# Patient Record
Sex: FEMALE | Race: WHITE | NOT HISPANIC OR LATINO | Employment: OTHER | URBAN - METROPOLITAN AREA
[De-identification: names, ages, dates, MRNs, and addresses within clinical notes are randomized per-mention and may not be internally consistent; named-entity substitution may affect disease eponyms.]

---

## 2017-01-06 ENCOUNTER — TRANSFERRED RECORDS (OUTPATIENT)
Dept: HEALTH INFORMATION MANAGEMENT | Facility: CLINIC | Age: 57
End: 2017-01-06

## 2017-03-22 ENCOUNTER — HOSPITAL ENCOUNTER (OUTPATIENT)
Dept: MAMMOGRAPHY | Facility: CLINIC | Age: 57
Discharge: HOME | End: 2017-03-22
Attending: FAMILY MEDICINE | Admitting: FAMILY MEDICINE
Payer: COMMERCIAL

## 2017-03-22 DIAGNOSIS — Z12.31 VISIT FOR SCREENING MAMMOGRAM: ICD-10-CM

## 2017-03-22 PROCEDURE — G0202 SCR MAMMO BI INCL CAD: HCPCS

## 2017-04-05 ENCOUNTER — RADIANT APPOINTMENT (OUTPATIENT)
Dept: GENERAL RADIOLOGY | Facility: CLINIC | Age: 57
End: 2017-04-05
Attending: PHYSICIAN ASSISTANT
Payer: COMMERCIAL

## 2017-04-05 ENCOUNTER — OFFICE VISIT (OUTPATIENT)
Dept: FAMILY MEDICINE | Facility: CLINIC | Age: 57
End: 2017-04-05
Payer: COMMERCIAL

## 2017-04-05 VITALS
BODY MASS INDEX: 28.7 KG/M2 | DIASTOLIC BLOOD PRESSURE: 78 MMHG | SYSTOLIC BLOOD PRESSURE: 112 MMHG | HEART RATE: 74 BPM | WEIGHT: 205 LBS | HEIGHT: 71 IN | TEMPERATURE: 97.2 F | OXYGEN SATURATION: 99 %

## 2017-04-05 DIAGNOSIS — M25.562 ACUTE PAIN OF LEFT KNEE: ICD-10-CM

## 2017-04-05 DIAGNOSIS — M17.12 PATELLOFEMORAL ARTHRITIS OF LEFT KNEE: Primary | ICD-10-CM

## 2017-04-05 PROCEDURE — 99213 OFFICE O/P EST LOW 20 MIN: CPT | Mod: 25 | Performed by: PHYSICIAN ASSISTANT

## 2017-04-05 PROCEDURE — 73562 X-RAY EXAM OF KNEE 3: CPT | Mod: LT

## 2017-04-05 PROCEDURE — 20610 DRAIN/INJ JOINT/BURSA W/O US: CPT | Mod: LT | Performed by: PHYSICIAN ASSISTANT

## 2017-04-05 RX ORDER — TRIAMCINOLONE ACETONIDE 40 MG/ML
80 INJECTION, SUSPENSION INTRA-ARTICULAR; INTRAMUSCULAR ONCE
Qty: 2 ML | Refills: 0 | OUTPATIENT
Start: 2017-04-05 | End: 2017-04-05

## 2017-04-05 NOTE — MR AVS SNAPSHOT
After Visit Summary   4/5/2017    Alfonzo Cho    MRN: [id number]           Patient Information     Date Of Birth          1960        Visit Information        Provider Department      4/5/2017 8:20 AM Lizzy Marr PA-C [facility] [place]        Today's Diagnoses     Patellofemoral arthritis of left knee    -  1    Acute pain of left knee          Care Instructions                    Patellofemoral Pain Syndrome (Runner's Knee)             What is patellofemoral pain syndrome?   Patellofemoral pain syndrome is pain behind the kneecap. It may also be called patellofemoral disorder, patellar malalignment, runner's knee, and chondromalacia.   How does it occur?   Patellofemoral pain syndrome can occur from overuse of the knee in sports and activities such as running, walking, jumping, or bicycling.   The kneecap (patella) is attached to the large group of muscles in the thigh called the quadriceps. It is also attached to the shin bone by the patellar tendon. The kneecap fits into grooves in the end of the thigh bone (femur) called the femoral condyle. With repeated bending and straightening of the knee, you can irritate the inside surface of the kneecap and cause pain.   Patellofemoral pain syndrome also may result from the way your hips, legs, knees, or feet are aligned. For example, if you have wide hips or underdeveloped thigh muscles, or if you are knock-kneed You may also have this problem if your foot flattens too much when you walk or run (a condition called over-pronation).   What are the symptoms?   The main symptom is pain behind the kneecap. You may have pain when you walk, run, or sit for a long time. The pain is usually worse when you walk downhill or down stairs. Your knee may swell at times. You may feel or hear snapping, popping, or grinding in the knee.   How is it diagnosed?   Your healthcare provider will review your symptoms and examine your knee. You will have  knee X-rays. You may have an MRI to check for damage to the surface of the patella or femur or another injury.   How is it treated?   Treatment includes the following:   Put an ice pack, gel pack, or package of frozen vegetables, wrapped in a cloth on the area every 3 to 4 hours, for up to 20 minutes at a time.   Raise the knee on a pillow when you sit or lie down.   Take an anti-inflammatory medicine such as ibuprofen, or other medicine as directed by your provider. Nonsteroidal anti-inflammatory medicines (NSAIDs) may cause stomach bleeding and other problems. These risks increase with age. Read the label and take as directed. Unless recommended by your healthcare provider, do not take for more than 10 days.   Follow your provider's instructions for doing exercises to help you recover. Your healthcare provider will show you exercises to help decrease the pain behind your kneecap.   If you over-pronate, your healthcare provider may recommend shoe inserts, called orthotics. You can buy orthotics at a pharmacy or athletic shoe store or they can be custom-made.   Use an infrapatellar strap, a strap placed below the kneecap over the patellar tendon.   Wear a neoprene knee sleeve, which will give support to your knee and patella.   While you recover from your injury, you will need to change your sport or activity to one that does not make your condition worse. For example, you may need to bicycle or swim instead of run.   In cases of severe patellofemoral pain syndrome, surgery may be recommended.   How long will the effects last?   Patellofemoral pain often lasts a long time and can come back after symptoms were better for a while. Treatment requires proper rehabilitation exercises that are done regularly.   When can I return to my normal activities?   Everyone recovers from an injury at a different rate. Return to your activities depends on how soon your knee recovers, not by how many days or weeks it has been since  your injury has occurred. In general, the longer you have symptoms before you start treatment, the longer it will take to get better. The goal is to return you to your normal activities as soon as is safely possible. If you return too soon you may worsen your injury.   You may safely return to your normal activities when, starting from the top of the list and progressing to the end, each of the following is true:   Your injured knee can be fully straightened and bent without pain.   Your knee and leg have regained normal strength compared to the uninjured knee and leg.   You are able to walk, bend, and squat without pain.   How can I prevent runner's knee?   Runner's knee can best be prevented by strengthening your thigh muscles, particularly the inside part of this muscle group. It is also important to wear shoes that fit well and that have good arch supports.     Published by [employer].  This content is reviewed periodically and is subject to change as new health information becomes available. The information is intended to inform and educate and is not a replacement for medical evaluation, advice, diagnosis or treatment by a healthcare professional.   Written by Jose Godoy MD, for [employer]   ? 2010 [employer] and/or its affiliates. All Rights Reserved.   Copyright   [employer] 2011  Adult Health Advisor    Patellofemoral Pain Syndrome (Runner's Knee) Rehabilitation Exercises              You can do the hamstring stretch right away. When the pain in your knee has decreased, you can do the quadriceps stretch and start strengthening the thigh muscles using the rest of the exercises.   Standing hamstring stretch: Put the heel of the leg on your injured side on a stool about 15 inches high. Keep your leg straight. Lean forward, bending at the hips, until you feel a mild stretch in the back of your thigh. Make sure you don't roll your shoulders or bend at the waist when doing this or you will  stretch your lower back instead of your leg. Hold the stretch for 15 to 30 seconds. Repeat 3 times.   Quadriceps stretch: Stand an arm's length away from the wall with your injured leg farthest from the wall. Facing straight ahead, brace yourself by keeping one hand against the wall. With your other hand, grasp the ankle of your injured leg and pull your heel toward your buttocks. Don't arch or twist your back. Keep your knees together. Hold this stretch for 15 to 30 seconds.   Side-lying leg lift: Lie on your uninjured side. Tighten the front thigh muscles on your injured leg and lift that leg 8 to 10 inches away from the other leg. Keep the leg straight and lower it slowly. Do 3 sets of 10.   Quad sets: Sit on the floor with your injured leg straight and your other leg bent. Press the back of the knee of your injured leg against the floor by tightening the muscles on the top of your thigh. Hold this position 10 seconds. Relax. Do 3 sets of 10.   Straight leg raise: Lie on your back with your legs straight out in front of you. Bend the knee on your uninjured side and place the foot flat on the floor. Tighten the thigh muscle on your injured side and lift your leg about 8 inches off the floor. Keep your leg straight and your thigh muscle tight. Slowly lower your leg back down to the floor. Do 3 sets of 10.   Step-up: Stand with the foot of your injured leg on a support 3 to 5 inches high (like a small step or block of wood). Keep your other foot flat on the floor. Shift your weight onto the injured leg on the support. Straighten your injured leg as the other leg comes off the floor. Return to the starting position by bending your injured leg and slowly lowering your uninjured leg back to the floor. Do 3 sets of 10.   Wall squat with a ball: Stand with your back, shoulders, and head against a wall. Look straight ahead. Keep your shoulders relaxed and your feet 3 feet from the wall and shoulder's width apart. Place a  soccer or basketball-sized ball behind your back. Keeping your back against the wall, slowly squat down to a 45-degree angle. Your thighs will not yet be parallel to the floor. Hold this position for 10 seconds and then slowly slide back up the wall. Repeat 10 times. Build up to 3 sets of 10.   Knee stabilization: Wrap a piece of elastic tubing around the ankle of the uninjured leg. Tie a knot in the other end of the tubing and close it in a door.   0. Stand facing the door on the leg without tubing and bend your knee slightly, keeping your thigh muscles tight. While maintaining this position, move the leg with the tubing straight back behind you. Do 3 sets of 10.   0. Turn 90 degrees so the leg without tubing is closest to the door. Move the leg with tubing away from your body. Do 3 sets of 10.   0. Turn 90 degrees again so your back is to the door. Move the leg with tubing straight out in front of you. Do 3 sets of 10.   0. Turn your body 90 degrees again so the leg with tubing is closest to the door. Move the leg with tubing across your body. Do 3 sets of 10.   Hold onto a chair if you need help balancing. This exercise can be made even more challenging by standing on a pillow while you move the leg with tubing.   Resisted terminal knee extension: Make a loop from a piece of elastic tubing by tying a knot in both ends. Close both knots in a door. Step into the loop so the tubing is around the back of your injured leg. Lift the other foot off the ground. Hold onto a chair for balance, if needed. Bend the knee on the leg with tubing about 45 degrees. Slowly straighten your leg, keeping your thigh muscle tight as you do this. Do this 10 times. Do 3 sets. An easier way to do this is to stand on both legs for better support while you do the exercise.   Standing calf stretch: Stand facing a wall with your hands on the wall at about eye level. Keep your injured leg back with your heel on the floor. Keep the other leg  forward with the knee bent. Turn your back foot slightly inward (as if you were pigeon-toed). Slowly lean into the wall until you feel a stretch in the back of your calf. Hold the stretch for 15 to 30 seconds. Return to the starting position. Repeat 3 times. Do this exercise several times each day.   Clam exercise: Lie on your uninjured side with your hips and knees bent and feet together. Slowly raise your top leg toward the ceiling while keeping your heels touching each other. Hold for 2 seconds and lower slowly. Do 3 sets of 10 repetitions.   Iliotibial band stretch: Side-bending: Cross one leg in front of the other leg and lean in the opposite direction from the front leg. Reach the arm on the side of the back leg over your head while you do this. Hold this position for 15 to 30 seconds. Return to the starting position. Repeat 3 times and then switch legs and repeat the exercise.   Published by [employer].  This content is reviewed periodically and is subject to change as new health information becomes available. The information is intended to inform and educate and is not a replacement for medical evaluation, advice, diagnosis or treatment by a healthcare professional.   Written by Dacia Vides, MS, PT, and Carol Funes, PT, [facility], [facility], for [employer].   ? 2010 [facility] and/or its affiliates. All Rights Reserved.   Copyright   [employer] Reference Systems 2011  Adult Health Advisor                                   Follow-ups after your visit        Your next 10 appointments already scheduled     Apr 21, 2017  8:15 AM CDT   PHYSICAL with Kristal Peña MD   [facility]    [address] [place] [address] [id number] [phone]              Who to contact     If you have questions or need follow up information about today's clinic visit or your schedule please contact [facility] directly at [phone].  Normal or  "non-critical lab and imaging results will be communicated to you by MyChart, letter or phone within 4 business days after the clinic has received the results. If you do not hear from us within 7 days, please contact the clinic through [employer] or phone. If you have a critical or abnormal lab result, we will notify you by phone as soon as possible.  Submit refill requests through [employer] or call your pharmacy and they will forward the refill request to us. Please allow 3 business days for your refill to be completed.          Additional Information About Your Visit        Bringrr Information     [employer] gives you secure access to your electronic health record. If you see a primary care provider, you can also send messages to your care team and make appointments. If you have questions, please call your primary care clinic.  If you do not have a primary care provider, please call [phone] and they will assist you.        Care EveryWhere ID     This is your Care EveryWhere ID. This could be used by other organizations to access your [facility] medical records  [id number]        Your Vitals Were     Pulse Temperature Height Last Period Pulse Oximetry BMI (Body Mass Index)    74 97.2  F (36.2  C) (Tympanic) 5' 11\" (1.803 m) 05/01/2009 99% 28.59 kg/m2       Blood Pressure from Last 3 Encounters:   04/05/17 112/78   12/30/16 120/74   11/23/16 102/66    Weight from Last 3 Encounters:   04/05/17 205 lb (93 kg)   12/30/16 203 lb (92.1 kg)   11/23/16 203 lb (92.1 kg)              We Performed the Following     Kenalog  80 MG         []     Large Joint/Bursa injection and/or drainage (Shoulder, Knee)          Today's Medication Changes          These changes are accurate as of: 4/5/17  9:13 AM.  If you have any questions, ask your nurse or doctor.               Start taking these medicines.        Dose/Directions    triamcinolone acetonide 40 MG/ML injection   Commonly known as:  KENALOG   Used for:  Patellofemoral " arthritis of left knee   Started by:  Lizzy Marr PA-C        Dose:  80 mg   2 mLs (80 mg) by INTRA-ARTICULAR route once for 1 dose   Quantity:  2 mL   Refills:  0            Where to get your medicines      Some of these will need a paper prescription and others can be bought over the counter.  Ask your nurse if you have questions.     You don't need a prescription for these medications     triamcinolone acetonide 40 MG/ML injection                Primary Care Provider Office Phone # Fax #    Kristal Peña [phone]       [facility] [address] [facility] [address]        Thank you!     Thank you for choosing [facility]  for your care. Our goal is always to provide you with excellent care. Hearing back from our patients is one way we can continue to improve our services. Please take a few minutes to complete the written survey that you may receive in the mail after your visit with us. Thank you!             Your Updated Medication List - Protect others around you: Learn how to safely use, store and throw away your medicines at www.disposemymeds.org.          This list is accurate as of: 4/5/17  9:13 AM.  Always use your most recent med list.                   Brand Name Dispense Instructions for use    aspirin 81 MG tablet     30 tablet    Take 1 tablet (81 mg) by mouth daily For hx of blood clots - take with food and warm water in the am       cetirizine 10 MG tablet    zyrTEC    30 tablet    Take 1 tablet (10 mg) by mouth as needed for allergies       cyclobenzaprine 10 MG tablet    FLEXERIL    30 tablet    Take 0.5-1 tablets (5-10 mg) by mouth 3 times daily as needed for muscle spasms       triamcinolone acetonide 40 MG/ML injection    KENALOG    2 mL    2 mLs (80 mg) by INTRA-ARTICULAR route once for 1 dose

## 2017-04-05 NOTE — PROGRESS NOTES
SUBJECTIVE:                                                    Alfonzo Cho is a 56 year old female who presents to clinic today for the following health issues:    Knee Pain  Alfonzo who is patient with PMH significant for chronic joint pain presents to clinic today with chief complaint of left knee pain. Onset of symptoms was ~1 month ago. She has had left knee imaging done in 2013 after complaining of knee pain with no precipitating injuries/ activities. Imaging results were normal/ negative. She was then sent to sports orthopedics which per patient diagnosed her with bursitis. Patient has never agreed with this diagnosis. She then followed up with podiatry and she was told that her pain was consistent with IT band strain. Today she describes pain in left lateral knee radiating down left shin. She admits to falling this past winter on ice landing on left knee that may have aggravated left knee pain. Pain is aggravated by walking for long periods of time and climbing stairs. Knee is also stiff when standing after a long period of sitting.     Problem list and histories reviewed & adjusted, as indicated.  Additional history: as documented    Patient Active Problem List   Diagnosis     Pain in joint, pelvic region and thigh     Seasonal allergic rhinitis     Symptomatic menopausal or female climacteric states     Sleep apnea      Thrombophlebitis of Femoral Vein - hx of      history of Thrombophlebitis leg (H)     Plantar fasciitis     Seborrheic dermatitis     CARDIOVASCULAR SCREENING; LDL GOAL LESS THAN 160     Infrapatellar bursitis     Low blood pressure- usual - asymptomatic- 90/60 = baseline     Family history of hypothyroidism- father     Memory difficulties- some short-term, worse at work     Advanced directives, counseling/discussion     Aortic calcification (H)     Nonrheumatic aortic valve insufficiency - mild - no symptoms.      Papanicolaou smear of cervix with atypical squamous cells of undetermined  significance (ASC-US)     Past Surgical History:   Procedure Laterality Date     C NONSPECIFIC PROCEDURE      S/P CSECT X 3     C NONSPECIFIC PROCEDURE  92,02    S/P VEIN STRIPPING      HC COLONOSCOPY THRU STOMA, DIAGNOSTIC  2009     [place] gastro - normal - repeat in 2019        Social History   Substance Use Topics     Smoking status: Former Smoker     Years: 2.00     Types: Cigarettes     Start date: 1978     Quit date: 1980     Smokeless tobacco: Never Used      Comment: only smoked a couple of years     Alcohol use 0.0 oz/week     0 Standard drinks or equivalent per week      Comment: 0-2 drinks per week     Family History   Problem Relation Age of Onset     Eye Disorder Father      glaucoma     Neurologic Disorder Father      Passed away from ALS at 74     Thyroid Disease Father      CANCER Mother 78     endometrial cancer s/p hysterectomy at age 80 - still alive      OSTEOPOROSIS Mother      on Fosamax      C.A.D. Maternal Grandfather       age 62     DIABETES Paternal Uncle      CEREBROVASCULAR DISEASE Maternal Grandmother       age 70      Esophageal Cancer Maternal Uncle      Esophageal Cancer Maternal Uncle          Current Outpatient Prescriptions   Medication Sig Dispense Refill     triamcinolone acetonide (KENALOG) 40 MG/ML injection 2 mLs (80 mg) by INTRA-ARTICULAR route once for 1 dose 2 mL 0     aspirin 81 MG tablet Take 1 tablet (81 mg) by mouth daily For hx of blood clots - take with food and warm water in the am 30 tablet      cyclobenzaprine (FLEXERIL) 10 MG tablet Take 0.5-1 tablets (5-10 mg) by mouth 3 times daily as needed for muscle spasms 30 tablet 1     cetirizine (ZYRTEC) 10 MG tablet Take 1 tablet (10 mg) by mouth as needed for allergies 30 tablet 1     Allergies   Allergen Reactions     Penicillins      IV penicillin- Hives, edema hands and feet       Reviewed and updated as needed this visit by clinical staff  Tobacco  Allergies  Meds  Problems  Med Hx       Reviewed  "and updated as needed this visit by Provider  Allergies  Meds  Problems  Med Hx         ROS:  Constitutional, HEENT, cardiovascular, pulmonary, GI, , musculoskeletal, neuro, skin, endocrine and psych systems are negative, except as otherwise noted.    This document serves as a record of the services and decisions personally performed and made by Lizzy Marr PA-C. It was created on her behalf by Lali Couch, a trained medical scribe. The creation of this document is based the provider's statements to the medical scribe.  Lali Couch, April 5, 2017 8:43 AM    OBJECTIVE:                                                    /78  Pulse 74  Temp 97.2  F (36.2  C) (Tympanic)  Ht 5' 11\" (1.803 m)  Wt 205 lb (93 kg)  LMP 05/01/2009  SpO2 99%  BMI 28.59 kg/m2  Body mass index is 28.59 kg/(m^2).     GENERAL: healthy, alert and no distress  RESP: lungs clear to auscultation - no rales, rhonchi or wheezes  CV: regular rate and rhythm, normal S1 S2, no S3 or S4, no murmur, click or rub, no peripheral edema and peripheral pulses strong  MS: 3+ swelling of left knee, left lateral joint line tenderness, left IT band and IT insertion tender, left patellar crepitation  NEURO: Normal strength and tone, mentation intact and speech normal  PSYCH: mentation appears normal, affect normal/bright    Procedure:  After discussing the risks, benefits and alternatives to a left knee intra-articularcortisone injection the patient elected to proceed with the injection.  The anterior aspect of the left knee was prepped with a betadine solution.  Using a sterile technique and a 22 gauge needle, 4 cc's of 1% lidocaine, 4 cc's of 0.25% marcaine, and 80 mg in Kenalog were injected into the left knee intra-articular.  The patient tolerated the procedure well and there were no immediate adverse effects.        Diagnostic Test Results:  Xray -   Recent Results (from the past 24 hour(s))   XR Knee Left 3 Views    Narrative    LEFT KNEE " 3 VIEWS  4/5/2017 8:59 AM    HISTORY:  Pain in left knee    COMPARISON:  9/18/2013    FINDINGS:  No fracture or osseous lesion is seen. The joint spaces are  well preserved. No adjacent soft tissue pathology is seen.      Impression    IMPRESSION:  Unremarkable examination. I see no definite change since  the previous examination.     DANN BALDERRAMA MD        ASSESSMENT/PLAN:                                                    Alfonzo was seen today for knee pain.    Diagnoses and all orders for this visit:    Patellofemoral arthritis of left knee, Acute pain of left knee - cannot exclude lateral meniscal tear and/or IT band involvement.  Patient is stable and doing well after left knee injection.  Given exercises for PF syndrome.  If no improvement or only short term improvement patient will call clinic and we should pursue MRI.  -     Large Joint/Bursa injection and/or drainage (Shoulder, Knee)  -     Kenalog  80 MG         []  -     triamcinolone acetonide (KENALOG) 40 MG/ML injection; 2 mLs (80 mg) by INTRA-ARTICULAR route once for 1 dose  -     XR Knee Left 3 Views; Future    The information in this document, created by the medical scribe for me, accurately reflects the services I personally performed and the decisions made by me. I have reviewed and approved this document for accuracy prior to leaving the patient care area.   Lizzy Marr PA-C April 5, 2017 8:43 AM    Lizzy Marr PA-C  [facility]

## 2017-04-05 NOTE — PATIENT INSTRUCTIONS
Patellofemoral Pain Syndrome (Runner's Knee)             What is patellofemoral pain syndrome?   Patellofemoral pain syndrome is pain behind the kneecap. It may also be called patellofemoral disorder, patellar malalignment, runner's knee, and chondromalacia.   How does it occur?   Patellofemoral pain syndrome can occur from overuse of the knee in sports and activities such as running, walking, jumping, or bicycling.   The kneecap (patella) is attached to the large group of muscles in the thigh called the quadriceps. It is also attached to the shin bone by the patellar tendon. The kneecap fits into grooves in the end of the thigh bone (femur) called the femoral condyle. With repeated bending and straightening of the knee, you can irritate the inside surface of the kneecap and cause pain.   Patellofemoral pain syndrome also may result from the way your hips, legs, knees, or feet are aligned. For example, if you have wide hips or underdeveloped thigh muscles, or if you are knock-kneed You may also have this problem if your foot flattens too much when you walk or run (a condition called over-pronation).   What are the symptoms?   The main symptom is pain behind the kneecap. You may have pain when you walk, run, or sit for a long time. The pain is usually worse when you walk downhill or down stairs. Your knee may swell at times. You may feel or hear snapping, popping, or grinding in the knee.   How is it diagnosed?   Your healthcare provider will review your symptoms and examine your knee. You will have knee X-rays. You may have an MRI to check for damage to the surface of the patella or femur or another injury.   How is it treated?   Treatment includes the following:   Put an ice pack, gel pack, or package of frozen vegetables, wrapped in a cloth on the area every 3 to 4 hours, for up to 20 minutes at a time.   Raise the knee on a pillow when you sit or lie down.   Take an anti-inflammatory medicine such  as ibuprofen, or other medicine as directed by your provider. Nonsteroidal anti-inflammatory medicines (NSAIDs) may cause stomach bleeding and other problems. These risks increase with age. Read the label and take as directed. Unless recommended by your healthcare provider, do not take for more than 10 days.   Follow your provider's instructions for doing exercises to help you recover. Your healthcare provider will show you exercises to help decrease the pain behind your kneecap.   If you over-pronate, your healthcare provider may recommend shoe inserts, called orthotics. You can buy orthotics at a pharmacy or athletic shoe store or they can be custom-made.   Use an infrapatellar strap, a strap placed below the kneecap over the patellar tendon.   Wear a neoprene knee sleeve, which will give support to your knee and patella.   While you recover from your injury, you will need to change your sport or activity to one that does not make your condition worse. For example, you may need to bicycle or swim instead of run.   In cases of severe patellofemoral pain syndrome, surgery may be recommended.   How long will the effects last?   Patellofemoral pain often lasts a long time and can come back after symptoms were better for a while. Treatment requires proper rehabilitation exercises that are done regularly.   When can I return to my normal activities?   Everyone recovers from an injury at a different rate. Return to your activities depends on how soon your knee recovers, not by how many days or weeks it has been since your injury has occurred. In general, the longer you have symptoms before you start treatment, the longer it will take to get better. The goal is to return you to your normal activities as soon as is safely possible. If you return too soon you may worsen your injury.   You may safely return to your normal activities when, starting from the top of the list and progressing to the end, each of the following is  true:   Your injured knee can be fully straightened and bent without pain.   Your knee and leg have regained normal strength compared to the uninjured knee and leg.   You are able to walk, bend, and squat without pain.   How can I prevent runner's knee?   Runner's knee can best be prevented by strengthening your thigh muscles, particularly the inside part of this muscle group. It is also important to wear shoes that fit well and that have good arch supports.     Published by [employer].  This content is reviewed periodically and is subject to change as new health information becomes available. The information is intended to inform and educate and is not a replacement for medical evaluation, advice, diagnosis or treatment by a healthcare professional.   Written by Jose Godoy MD, for [employer]   ? 2010 [facility] and/or its affiliates. All Rights Reserved.   Copyright   [employer] 2011  Adult Health Advisor    Patellofemoral Pain Syndrome (Runner's Knee) Rehabilitation Exercises              You can do the hamstring stretch right away. When the pain in your knee has decreased, you can do the quadriceps stretch and start strengthening the thigh muscles using the rest of the exercises.   Standing hamstring stretch: Put the heel of the leg on your injured side on a stool about 15 inches high. Keep your leg straight. Lean forward, bending at the hips, until you feel a mild stretch in the back of your thigh. Make sure you don't roll your shoulders or bend at the waist when doing this or you will stretch your lower back instead of your leg. Hold the stretch for 15 to 30 seconds. Repeat 3 times.   Quadriceps stretch: Stand an arm's length away from the wall with your injured leg farthest from the wall. Facing straight ahead, brace yourself by keeping one hand against the wall. With your other hand, grasp the ankle of your injured leg and pull your heel toward your buttocks. Don't arch or twist your  back. Keep your knees together. Hold this stretch for 15 to 30 seconds.   Side-lying leg lift: Lie on your uninjured side. Tighten the front thigh muscles on your injured leg and lift that leg 8 to 10 inches away from the other leg. Keep the leg straight and lower it slowly. Do 3 sets of 10.   Quad sets: Sit on the floor with your injured leg straight and your other leg bent. Press the back of the knee of your injured leg against the floor by tightening the muscles on the top of your thigh. Hold this position 10 seconds. Relax. Do 3 sets of 10.   Straight leg raise: Lie on your back with your legs straight out in front of you. Bend the knee on your uninjured side and place the foot flat on the floor. Tighten the thigh muscle on your injured side and lift your leg about 8 inches off the floor. Keep your leg straight and your thigh muscle tight. Slowly lower your leg back down to the floor. Do 3 sets of 10.   Step-up: Stand with the foot of your injured leg on a support 3 to 5 inches high (like a small step or block of wood). Keep your other foot flat on the floor. Shift your weight onto the injured leg on the support. Straighten your injured leg as the other leg comes off the floor. Return to the starting position by bending your injured leg and slowly lowering your uninjured leg back to the floor. Do 3 sets of 10.   Wall squat with a ball: Stand with your back, shoulders, and head against a wall. Look straight ahead. Keep your shoulders relaxed and your feet 3 feet from the wall and shoulder's width apart. Place a soccer or basketball-sized ball behind your back. Keeping your back against the wall, slowly squat down to a 45-degree angle. Your thighs will not yet be parallel to the floor. Hold this position for 10 seconds and then slowly slide back up the wall. Repeat 10 times. Build up to 3 sets of 10.   Knee stabilization: Wrap a piece of elastic tubing around the ankle of the uninjured leg. Tie a knot in the other  end of the tubing and close it in a door.   0. Stand facing the door on the leg without tubing and bend your knee slightly, keeping your thigh muscles tight. While maintaining this position, move the leg with the tubing straight back behind you. Do 3 sets of 10.   0. Turn 90 degrees so the leg without tubing is closest to the door. Move the leg with tubing away from your body. Do 3 sets of 10.   0. Turn 90 degrees again so your back is to the door. Move the leg with tubing straight out in front of you. Do 3 sets of 10.   0. Turn your body 90 degrees again so the leg with tubing is closest to the door. Move the leg with tubing across your body. Do 3 sets of 10.   Hold onto a chair if you need help balancing. This exercise can be made even more challenging by standing on a pillow while you move the leg with tubing.   Resisted terminal knee extension: Make a loop from a piece of elastic tubing by tying a knot in both ends. Close both knots in a door. Step into the loop so the tubing is around the back of your injured leg. Lift the other foot off the ground. Hold onto a chair for balance, if needed. Bend the knee on the leg with tubing about 45 degrees. Slowly straighten your leg, keeping your thigh muscle tight as you do this. Do this 10 times. Do 3 sets. An easier way to do this is to stand on both legs for better support while you do the exercise.   Standing calf stretch: Stand facing a wall with your hands on the wall at about eye level. Keep your injured leg back with your heel on the floor. Keep the other leg forward with the knee bent. Turn your back foot slightly inward (as if you were pigeon-toed). Slowly lean into the wall until you feel a stretch in the back of your calf. Hold the stretch for 15 to 30 seconds. Return to the starting position. Repeat 3 times. Do this exercise several times each day.   Clam exercise: Lie on your uninjured side with your hips and knees bent and feet together. Slowly raise your  top leg toward the ceiling while keeping your heels touching each other. Hold for 2 seconds and lower slowly. Do 3 sets of 10 repetitions.   Iliotibial band stretch: Side-bending: Cross one leg in front of the other leg and lean in the opposite direction from the front leg. Reach the arm on the side of the back leg over your head while you do this. Hold this position for 15 to 30 seconds. Return to the starting position. Repeat 3 times and then switch legs and repeat the exercise.   Published by [employer].  This content is reviewed periodically and is subject to change as new health information becomes available. The information is intended to inform and educate and is not a replacement for medical evaluation, advice, diagnosis or treatment by a healthcare professional.   Written by Dacia Vides MS, PT, and Carol Funes PT, [facility], [facility], for [employer].   ? 2010 [facility] and/or its affiliates. All Rights Reserved.   Copyright   [employer] 2011  Adult Health Advisor

## 2017-04-05 NOTE — NURSING NOTE
"Chief Complaint   Patient presents with     Knee Pain       Initial /78  Pulse 74  Temp 97.2  F (36.2  C) (Tympanic)  Ht 5' 11\" (1.803 m)  Wt 205 lb (93 kg)  LMP 05/01/2009  SpO2 99%  BMI 28.59 kg/m2 Estimated body mass index is 28.59 kg/(m^2) as calculated from the following:    Height as of this encounter: 5' 11\" (1.803 m).    Weight as of this encounter: 205 lb (93 kg).  Medication Reconciliation: complete   Anne-Marie Pruitt, KARLA      "

## 2017-04-06 ENCOUNTER — TELEPHONE (OUTPATIENT)
Dept: FAMILY MEDICINE | Facility: CLINIC | Age: 57
End: 2017-04-06

## 2017-04-06 NOTE — TELEPHONE ENCOUNTER
Pt had a cortisone injection yesterday and has had facial flushing    [phone].    Message handled by Nurse Triage with [employer] - provider name: STEWART CUMMINGS     Called pt back advised it is not abnormal but to monitor if breathing issues go to ER.  If pt gets rash call back.    Leeann Kurtz RN    [facility]

## 2017-04-06 NOTE — PROGRESS NOTES
Results discussed directly with patient while patient was present. Any further details documented in the note.   Lizzy Marr PA-C

## 2017-09-05 ENCOUNTER — OFFICE VISIT (OUTPATIENT)
Dept: FAMILY MEDICINE | Facility: CLINIC | Age: 57
End: 2017-09-05
Payer: COMMERCIAL

## 2017-09-05 VITALS
DIASTOLIC BLOOD PRESSURE: 61 MMHG | HEIGHT: 71 IN | WEIGHT: 203.8 LBS | TEMPERATURE: 96.9 F | HEART RATE: 84 BPM | BODY MASS INDEX: 28.53 KG/M2 | OXYGEN SATURATION: 98 % | SYSTOLIC BLOOD PRESSURE: 99 MMHG

## 2017-09-05 DIAGNOSIS — J30.2 SEASONAL ALLERGIC RHINITIS, UNSPECIFIED CHRONICITY, UNSPECIFIED TRIGGER: ICD-10-CM

## 2017-09-05 DIAGNOSIS — I35.1 NONRHEUMATIC AORTIC VALVE INSUFFICIENCY: ICD-10-CM

## 2017-09-05 DIAGNOSIS — I70.0 AORTIC CALCIFICATION (H): ICD-10-CM

## 2017-09-05 DIAGNOSIS — K21.9 GASTROESOPHAGEAL REFLUX DISEASE, ESOPHAGITIS PRESENCE NOT SPECIFIED: ICD-10-CM

## 2017-09-05 DIAGNOSIS — Z83.49 FAMILY HISTORY OF HYPOTHYROIDISM: ICD-10-CM

## 2017-09-05 DIAGNOSIS — N95.1 VAGINAL DRYNESS, MENOPAUSAL: ICD-10-CM

## 2017-09-05 DIAGNOSIS — I80.3 THROMBOPHLEBITIS LEG: ICD-10-CM

## 2017-09-05 DIAGNOSIS — Z00.00 ROUTINE GENERAL MEDICAL EXAMINATION AT A HEALTH CARE FACILITY: Primary | ICD-10-CM

## 2017-09-05 DIAGNOSIS — Z12.31 VISIT FOR SCREENING MAMMOGRAM: ICD-10-CM

## 2017-09-05 DIAGNOSIS — R41.3 MEMORY DIFFICULTIES: ICD-10-CM

## 2017-09-05 DIAGNOSIS — R53.83 OTHER FATIGUE: ICD-10-CM

## 2017-09-05 DIAGNOSIS — G47.30 SLEEP APNEA, UNSPECIFIED TYPE: ICD-10-CM

## 2017-09-05 DIAGNOSIS — Z11.59 NEED FOR HEPATITIS C SCREENING TEST: ICD-10-CM

## 2017-09-05 LAB
ALBUMIN SERPL-MCNC: 3.9 G/DL (ref 3.4–5)
ALP SERPL-CCNC: 64 U/L (ref 40–150)
ALT SERPL W P-5'-P-CCNC: 29 U/L (ref 0–50)
ANION GAP SERPL CALCULATED.3IONS-SCNC: 8 MMOL/L (ref 3–14)
AST SERPL W P-5'-P-CCNC: 21 U/L (ref 0–45)
BILIRUB SERPL-MCNC: 0.5 MG/DL (ref 0.2–1.3)
BUN SERPL-MCNC: 11 MG/DL (ref 7–30)
CALCIUM SERPL-MCNC: 9.1 MG/DL (ref 8.5–10.1)
CHLORIDE SERPL-SCNC: 106 MMOL/L (ref 94–109)
CHOLEST SERPL-MCNC: 179 MG/DL
CO2 SERPL-SCNC: 27 MMOL/L (ref 20–32)
CREAT SERPL-MCNC: 0.79 MG/DL (ref 0.52–1.04)
DEPRECATED CALCIDIOL+CALCIFEROL SERPL-MC: 22 UG/L (ref 20–75)
ERYTHROCYTE [DISTWIDTH] IN BLOOD BY AUTOMATED COUNT: 12.8 % (ref 10–15)
FERRITIN SERPL-MCNC: 114 NG/ML (ref 8–252)
GFR SERPL CREATININE-BSD FRML MDRD: 74 ML/MIN/1.7M2
GLUCOSE SERPL-MCNC: 91 MG/DL (ref 70–99)
HCT VFR BLD AUTO: 41.5 % (ref 35–47)
HCV AB SERPL QL IA: NONREACTIVE
HDLC SERPL-MCNC: 77 MG/DL
HGB BLD-MCNC: 13.7 G/DL (ref 11.7–15.7)
LDLC SERPL CALC-MCNC: 91 MG/DL
MCH RBC QN AUTO: 31 PG (ref 26.5–33)
MCHC RBC AUTO-ENTMCNC: 33 G/DL (ref 31.5–36.5)
MCV RBC AUTO: 94 FL (ref 78–100)
NONHDLC SERPL-MCNC: 102 MG/DL
PLATELET # BLD AUTO: 240 10E9/L (ref 150–450)
POTASSIUM SERPL-SCNC: 4 MMOL/L (ref 3.4–5.3)
PROT SERPL-MCNC: 7.1 G/DL (ref 6.8–8.8)
RBC # BLD AUTO: 4.42 10E12/L (ref 3.8–5.2)
SODIUM SERPL-SCNC: 141 MMOL/L (ref 133–144)
T3FREE SERPL-MCNC: 2.4 PG/ML (ref 2.3–4.2)
T4 FREE SERPL-MCNC: 1.15 NG/DL (ref 0.76–1.46)
TRIGL SERPL-MCNC: 53 MG/DL
TSH SERPL DL<=0.005 MIU/L-ACNC: 1.76 MU/L (ref 0.4–4)
VIT B12 SERPL-MCNC: 300 PG/ML (ref 193–986)
WBC # BLD AUTO: 4.9 10E9/L (ref 4–11)

## 2017-09-05 PROCEDURE — 80053 COMPREHEN METABOLIC PANEL: CPT | Performed by: PHYSICIAN ASSISTANT

## 2017-09-05 PROCEDURE — 84481 FREE ASSAY (FT-3): CPT | Performed by: PHYSICIAN ASSISTANT

## 2017-09-05 PROCEDURE — 99213 OFFICE O/P EST LOW 20 MIN: CPT | Mod: 25 | Performed by: PHYSICIAN ASSISTANT

## 2017-09-05 PROCEDURE — 82728 ASSAY OF FERRITIN: CPT | Performed by: PHYSICIAN ASSISTANT

## 2017-09-05 PROCEDURE — 85027 COMPLETE CBC AUTOMATED: CPT | Performed by: PHYSICIAN ASSISTANT

## 2017-09-05 PROCEDURE — 82607 VITAMIN B-12: CPT | Performed by: PHYSICIAN ASSISTANT

## 2017-09-05 PROCEDURE — 99396 PREV VISIT EST AGE 40-64: CPT | Performed by: PHYSICIAN ASSISTANT

## 2017-09-05 PROCEDURE — 82306 VITAMIN D 25 HYDROXY: CPT | Performed by: PHYSICIAN ASSISTANT

## 2017-09-05 PROCEDURE — 36415 COLL VENOUS BLD VENIPUNCTURE: CPT | Performed by: PHYSICIAN ASSISTANT

## 2017-09-05 PROCEDURE — 84439 ASSAY OF FREE THYROXINE: CPT | Performed by: PHYSICIAN ASSISTANT

## 2017-09-05 PROCEDURE — 84443 ASSAY THYROID STIM HORMONE: CPT | Performed by: PHYSICIAN ASSISTANT

## 2017-09-05 PROCEDURE — 86803 HEPATITIS C AB TEST: CPT | Performed by: PHYSICIAN ASSISTANT

## 2017-09-05 PROCEDURE — 86376 MICROSOMAL ANTIBODY EACH: CPT | Performed by: PHYSICIAN ASSISTANT

## 2017-09-05 PROCEDURE — 80061 LIPID PANEL: CPT | Performed by: PHYSICIAN ASSISTANT

## 2017-09-05 NOTE — NURSING NOTE
"Chief Complaint   Patient presents with     Physical       Initial BP 99/61 (BP Location: Left arm, Patient Position: Chair, Cuff Size: Adult Large)  Pulse 84  Temp 96.9  F (36.1  C) (Tympanic)  Ht 5' 11\" (1.803 m)  Wt 203 lb 12.8 oz (92.4 kg)  LMP 05/01/2009  SpO2 98%  Breastfeeding? No  BMI 28.42 kg/m2 Estimated body mass index is 28.42 kg/(m^2) as calculated from the following:    Height as of this encounter: 5' 11\" (1.803 m).    Weight as of this encounter: 203 lb 12.8 oz (92.4 kg).  Medication Reconciliation: complete     Alia Martinez MA     "

## 2017-09-05 NOTE — PROGRESS NOTES
"   SUBJECTIVE:   CC: Alfonzo Cho is an 57 year old woman who presents for preventive health visit.     Annual Exam:  Getting at least 3 servings of Calcium per day:: Yes  Bi-annual eye exam:: Yes  Dental care twice a year:: Yes  Sleep apnea or symptoms of sleep apnea:: Sleep apnea  Diet:: Regular (no restrictions)  Frequency of exercise:: 2-3 days/week  Taking medications regularly:: Not Applicable  Medication side effects:: Not applicable  Additional concerns today:: YES  PHQ-2 Score: 1  Duration of exercise:: 15-30 minutes      Vaginal concerns  Patient relates that she continues to experience vaginal dryness and pain with sexual intercourse. She uses lubrication with sexual intercourse. She notes she has applied estrogen cream three times a week without any apparent relief. She denies any known family history of breast cancer. She has a family history of uterine cancer in her mother (diagnosed at age 79).     Aortic calcification   Patient had Echocardiogram on 5/6/16 that showed mild aortic regurgitation. Echo was ordered as back XR showed incidental finding of aortic calcification. Family history of CVA and arthrosclerosis in maternal grandmother.     Sarabjit Mejía, who has history significant of sleep apnea with nightly use of CPAP, mentions fatigue and \"grogginess\" since Spring 2017. This has worsened over the last 2-3 weeks. She has been taking 2 hour naps. She has family history of hypothyroidism in father. No previous imaging of thyroid.     Eczema  Patient relates eczema presents mostly with allergies in the summer.     BRIANA Mejía mentions she experiences intermittent acid reflux symptoms. She uses TUMS/Rolaids for complete relief of symptoms. She is unsure of specific triggers.     Varicose veins  She has varicose veins bilaterally. She states that she had her veins stripped with only temporary relief.      Additional information:  Patient mentions seldom alcohol use. No recreational drug use. She is a " former smoker (in her 20s).   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     Today's PHQ-2 Score:   PHQ-2 (  [employer]) 2016   Q1: Little interest or pleasure in doing things 1 0   Q2: Feeling down, depressed or hopeless 0 0   PHQ-2 Score 1 0   Q1: Little interest or pleasure in doing things Several days -   Q2: Feeling down, depressed or hopeless Not at all -   PHQ-2 Score 1 -         Abuse: Current or Past(Physical, Sexual or Emotional)- No  Do you feel safe in your environment - Yes  Social History   Substance Use Topics     Smoking status: Former Smoker     Years: 2.00     Types: Cigarettes     Start date: 1978     Quit date: 1980     Smokeless tobacco: Never Used      Comment: only smoked a couple of years     Alcohol use 0.0 oz/week     0 Standard drinks or equivalent per week      Comment: 0-2 drinks per week     The patient does not drink >3 drinks per day nor >7 drinks per week.    Reviewed orders with patient.  Reviewed health maintenance and updated orders accordingly - Yes  Patient Active Problem List   Diagnosis     Pain in joint, pelvic region and thigh     Seasonal allergic rhinitis     Symptomatic menopausal or female climacteric states     Sleep apnea     history of Thrombophlebitis leg (H)     Plantar fasciitis     Seborrheic dermatitis     Infrapatellar bursitis     Low blood pressure- usual - asymptomatic- 90/60 = baseline     Family history of hypothyroidism- father     Memory difficulties- some short-term, worse at work     Advanced directives, counseling/discussion     Aortic calcification (H)     Nonrheumatic aortic valve insufficiency - mild - no symptoms.      Papanicolaou smear of cervix with atypical squamous cells of undetermined significance (ASC-US)     BMI 28.0-28.9,adult     Asymptomatic varicose veins     Gastroesophageal reflux disease, esophagitis presence not specified     Past Surgical History:   Procedure Laterality Date       SECTION      S/P CSECT X 3     ENDOSCOPIC STRIPPING VEIN(S)  92,02    S/P VEIN STRIPPING      HC COLONOSCOPY THRU STOMA, DIAGNOSTIC  2009     [place] gastro - normal - repeat in 2019      TUBAL LIGATION  1995    with 3rd        Social History   Substance Use Topics     Smoking status: Former Smoker     Years: 2.00     Types: Cigarettes     Start date: 1978     Quit date: 1980     Smokeless tobacco: Never Used      Comment: only smoked a couple of years     Alcohol use 0.0 oz/week     0 Standard drinks or equivalent per week      Comment: 0-2 drinks per week     Family History   Problem Relation Age of Onset     Neurologic Disorder Father      ALS     Hypothyroidism Father      Glaucoma Father      glaucoma     OSTEOPOROSIS Mother      on Fosamax      Uterine Cancer Mother 78     endometrial cancer s/p hysterectomy at age 80 - still alive      Esophageal Cancer Maternal Uncle      Esophageal Cancer Maternal Uncle      C.A.D. Maternal Grandfather       age 62     DIABETES Paternal Uncle      CEREBROVASCULAR DISEASE Maternal Grandmother       age 70          Current Outpatient Prescriptions   Medication Sig Dispense Refill     estrogen, conjugated,-medroxyPROGESTERone (PREMPRO) 0.3-1.5 MG per tablet Take 1 tablet by mouth daily 30 tablet 11     Allergies   Allergen Reactions     Penicillins      IV penicillin- Hives, edema hands and feet         Mammogram : 2017 - Results - normal     Pertinent mammograms are reviewed under the imaging tab.  History of abnormal Pap smear: YES    Lab Results   Component Value Date    PAP ASC-US 2016    PAP NIL 2013    PAP NIL 2011       Reviewed and updated as needed this visit by clinical staff  Tobacco  Allergies  Meds  Med Hx  Surg Hx  Fam Hx  Soc Hx        Reviewed and updated as needed this visit by Provider  Tobacco  Allergies  Med Hx  Surg Hx  Fam Hx  Soc Hx       Past Medical History:   Diagnosis Date     Aortic  calcification (H) 2016     Asymptomatic varicose veins      Contact dermatitis and other eczema, due to unspecified cause     seasonally     Nonrheumatic aortic valve insufficiency - mild - no symptoms.     incidental finding on thoracic XR     Papanicolaou smear of cervix with atypical squamous cells of undetermined significance (ASC-US) 16    Neg HR HPV     Phlebitis and thrombophlebitis of femoral vein (deep) (superficial) (H) not candidate for HRT     popliteal after progesterone oral therapy - no other clots      Sleep apnea     now on CPAP       Past Surgical History:   Procedure Laterality Date      SECTION      S/P CSECT X 3     ENDOSCOPIC STRIPPING VEIN(S)  92,02    S/P VEIN STRIPPING      HC COLONOSCOPY THRU STOMA, DIAGNOSTIC  2009     [place] gastro - normal - repeat in 2019      TUBAL LIGATION      with 3rd      Obstetric History       T3      L3     SAB0   TAB0   Ectopic0   Multiple0   Live Births0       # Outcome Date GA Lbr [place]/2nd Weight Sex Delivery Anes PTL Lv   4 SAB            3 Term            2 Term            1 Term               Obstetric Comments   S/p CS x 3 - first one for footling breech        ROS:  C: POSITIVE for fatigue NEGATIVE for fever, chills, change in weight  I: NEGATIVE for worrisome rashes, moles or lesions  E: NEGATIVE for vision changes or irritation  ENT: NEGATIVE for ear, mouth and throat problems  R: NEGATIVE for significant cough or SOB  B: NEGATIVE for masses, tenderness or discharge  CV: NEGATIVE for chest pain, palpitations or peripheral edema  GI: NEGATIVE for nausea, abdominal pain, heartburn, or change in bowel habits  : NEGATIVE for unusual urinary or vaginal symptoms. No vaginal bleeding.  M: NEGATIVE for significant arthralgias or myalgia  N: NEGATIVE for weakness, dizziness or paresthesias  P: NEGATIVE for changes in mood or affect    This document serves as a record of the services and decisions personally performed  "and made by Lizzy Marr PA-C. It was created on her behalf by Nidia Dowell, a trained medical scribe. The creation of this document is based on the provider's statements to the medical scribe.  Nidia Dowell 8:48 AM September 5, 2017    OBJECTIVE:   BP 99/61 (BP Location: Left arm, Patient Position: Chair, Cuff Size: Adult Large)  Pulse 84  Temp 96.9  F (36.1  C) (Tympanic)  Ht 5' 11\" (1.803 m)  Wt 203 lb 12.8 oz (92.4 kg)  LMP 05/01/2009  SpO2 98%  Breastfeeding? No  BMI 28.42 kg/m2  EXAM:  GENERAL APPEARANCE: healthy, alert and no distress  EYES: Eyes grossly normal to inspection, PERRL and conjunctivae and sclerae normal  HENT: ear canals and TM's normal, nose and mouth without ulcers or lesions, oropharynx clear and oral mucous membranes moist  NECK: no adenopathy, no asymmetry, masses, or scars and thyroid normal to palpation  RESP: lungs clear to auscultation - no rales, rhonchi or wheezes  BREAST: normal without masses, tenderness or nipple discharge and no palpable axillary masses or adenopathy  CV: regular rate and rhythm, normal S1 S2, no S3 or S4, no murmur, click or rub, no peripheral edema and peripheral pulses strong, prominent varicosities in right lower extremity   ABDOMEN: soft, nontender, no hepatosplenomegaly, no masses and bowel sounds normal  MS: no musculoskeletal defects are noted and gait is age appropriate without ataxia  SKIN: no suspicious lesions or rashes  NEURO: Normal strength and tone, sensory exam grossly normal, mentation intact and speech normal, cranial nerves II-XII grossly intact, point to point motions intact, RRM intact, able to heel toe walk, able to hop on one foot, and negative Romberg    PSYCH: mentation appears normal and affect normal/bright    Last mammogram 3/22/2017, with normal results. Due for next mammogram in 3/2018.   Last pap smear 7/2016, showing ASC-US. Due for next pap smear in 7/2019. No family history of cervical cancer.  Last " colonoscopy in 2009, with normal results. Due for colonoscopy in 2019.   ASSESSMENT/PLAN:   Alfonzo was seen today for physical.    Diagnoses and all orders for this visit:    Routine general medical examination at a health care facility  -     Comprehensive metabolic panel  -     CBC with platelets    Aortic calcification (H), Nonrheumatic aortic valve insufficiency - mild - no symptoms.  Discussed results of echocardiogram that showed mild aortic regurgitation and starting statin medication to prevent further plaque build up. Further work up to include carotid US. Will potentially start statin pending US results.  All questions invited, asked and answered to the patient's apparent satisfaction.  Patient agrees to plan.  -     Lipid panel reflex to direct LDL  -     US Carotid Bilateral; Future    Sleep apnea, unspecified type  Uses CPAP nightly.     Other fatigue  Patient reports fatigue over the last several months that is worsening for 2-3 weeks. She expresses concern this could be related to her thyroid. She has family history of hypothyroidism in her father. Will check thyroid functions, Vitamin D and B12, and ferritin. Follow up pending lab results.   -     Vitamin D Deficiency  -     Vitamin B12  -     Ferritin    Family history of hypothyroidism- father  -     TSH  -     T4, free  -     T3, Free  -     Thyroid peroxidase antibody    Seasonal allergic rhinitis, unspecified chronicity, unspecified trigger  Mostly present during the Spring and Summer.     Memory difficulties- some short-term, worse at work  -     Vitamin B12    Vaginal dryness, menopausal  Patient continues to experience vaginal dryness and pain with sexual intercourse. She has used topical estrogen cream with no relief. Starting low dose estradiol oral tablet. No known family history of breast cancer.   -     estrogen, conjugated,-medroxyPROGESTERone (PREMPRO) 0.3-1.5 MG per tablet; Take 1 tablet by mouth daily  -     OFFICE/OUTPT VISIT,EST,LEVL  "II    Gastroesophageal reflux disease, esophagitis presence not specified  Intermittently symptomatic. She relieves symptoms with use of TUMS/Rolaids.     BMI 28.0-28.9,adult  Body mass index is 28.42 kg/(m^2). Encouraged healthy diet and exercise program.    Visit for screening mammogram  -     *MA Screening Digital Bilateral; Future    Need for hepatitis C screening test  -     Hepatitis C Screen Reflex to HCV RNA Quant and Genotype    COUNSELING:   Reviewed preventive health counseling, as reflected in patient instructions       Regular exercise       Healthy diet/nutrition       Colon cancer screening       reports that she quit smoking about 37 years ago. Her smoking use included Cigarettes. She started smoking about 39 years ago. She quit after 2.00 years of use. She has never used smokeless tobacco.  Estimated body mass index is 28.42 kg/(m^2) as calculated from the following:    Height as of this encounter: 5' 11\" (1.803 m).    Weight as of this encounter: 203 lb 12.8 oz (92.4 kg).   Weight management plan: Discussed healthy diet and exercise guidelines and patient will follow up in 12 months in clinic to re-evaluate.    Counseling Resources:  ATP IV Guidelines  Pooled Cohorts Equation Calculator  Breast Cancer Risk Calculator  FRAX Risk Assessment  ICSI Preventive Guidelines  Dietary Guidelines for Americans, 2010  USDA's MyPlate  ASA Prophylaxis  Lung CA Screening    The information in this document, created by the medical scribe for me, accurately reflects the services I personally performed and the decisions made by me. I have reviewed and approved this document for accuracy prior to leaving the patient care area.  September 5, 2017 9:37 AM    Lizzy Marr PA-C  [facility] [place]      "

## 2017-09-05 NOTE — MR AVS SNAPSHOT
After Visit Summary   9/5/2017    Alfonzo Cho    MRN: [id number]           Patient Information     Date Of Birth          1960        Visit Information        Provider Department      9/5/2017 8:20 AM Lizzy Marr PA-C [facility] [place]        Today's Diagnoses     Routine general medical examination at a health care facility    -  1    Aortic calcification (H)        history of Thrombophlebitis leg (H)        Nonrheumatic aortic valve insufficiency - mild - no symptoms.         Sleep apnea, unspecified type        Family history of hypothyroidism- father        Seasonal allergic rhinitis, unspecified chronicity, unspecified trigger        Memory difficulties- some short-term, worse at work        Vaginal dryness, menopausal        Gastroesophageal reflux disease, esophagitis presence not specified        Other fatigue        BMI 28.0-28.9,adult        Visit for screening mammogram        Need for hepatitis C screening test          Care Instructions      Preventive Health Recommendations  Female Ages 50 - 64    Yearly exam: See your health care provider every year in order to  o Review health changes.   o Discuss preventive care.    o Review your medicines if your doctor has prescribed any.      Get a Pap test every three years (unless you have an abnormal result and your provider advises testing more often).    If you get Pap tests with HPV test, you only need to test every 5 years, unless you have an abnormal result.     You do not need a Pap test if your uterus was removed (hysterectomy) and you have not had cancer.    You should be tested each year for STDs (sexually transmitted diseases) if you're at risk.     Have a mammogram every 1 to 2 years.    Have a colonoscopy at age 50, or have a yearly FIT test (stool test). These exams screen for colon cancer.      Have a cholesterol test every 5 years, or more often if advised.    Have a diabetes test (fasting glucose) every  three years. If you are at risk for diabetes, you should have this test more often.     If you are at risk for osteoporosis (brittle bone disease), think about having a bone density scan (DEXA).    Shots: Get a flu shot each year. Get a tetanus shot every 10 years.    Nutrition:     Eat at least 5 servings of fruits and vegetables each day.    Eat whole-grain bread, whole-wheat pasta and brown rice instead of white grains and rice.    Talk to your provider about Calcium and Vitamin D.     Lifestyle    Exercise at least 150 minutes a week (30 minutes a day, 5 days a week). This will help you control your weight and prevent disease.    Limit alcohol to one drink per day.    No smoking.     Wear sunscreen to prevent skin cancer.     See your dentist every six months for an exam and cleaning.    See your eye doctor every 1 to 2 years.            Follow-ups after your visit        Future tests that were ordered for you today     Open Future Orders        Priority Expected Expires Ordered    US Carotid Bilateral Routine  9/5/2018 9/5/2017    *MA Screening Digital Bilateral Routine  9/5/2018 9/5/2017            Who to contact     If you have questions or need follow up information about today's clinic visit or your schedule please contact [facility] directly at [phone].  Normal or non-critical lab and imaging results will be communicated to you by Glancehart, letter or phone within 4 business days after the clinic has received the results. If you do not hear from us within 7 days, please contact the clinic through Glancehart or phone. If you have a critical or abnormal lab result, we will notify you by phone as soon as possible.  Submit refill requests through Polyvore or call your pharmacy and they will forward the refill request to us. Please allow 3 business days for your refill to be completed.          Additional Information About Your Visit        Polyvore Information     Polyvore gives you secure access  "to your electronic health record. If you see a primary care provider, you can also send messages to your care team and make appointments. If you have questions, please call your primary care clinic.  If you do not have a primary care provider, please call [phone] and they will assist you.        Care EveryWhere ID     This is your Care EveryWhere ID. This could be used by other organizations to access your [facility] medical records  [id number]        Your Vitals Were     Pulse Temperature Height Last Period Pulse Oximetry Breastfeeding?    84 96.9  F (36.1  C) (Tympanic) 5' 11\" (1.803 m) 05/01/2009 98% No    BMI (Body Mass Index)                   28.42 kg/m2            Blood Pressure from Last 3 Encounters:   09/05/17 99/61   04/05/17 112/78   12/30/16 120/74    Weight from Last 3 Encounters:   09/05/17 203 lb 12.8 oz (92.4 kg)   04/05/17 205 lb (93 kg)   12/30/16 203 lb (92.1 kg)              We Performed the Following     CBC with platelets     Comprehensive metabolic panel     Ferritin     Hepatitis C Screen Reflex to HCV RNA Quant and Genotype     Lipid panel reflex to direct LDL     OFFICE/OUTPT VISIT,EST,LEVL II     T3, Free     T4, free     Thyroid peroxidase antibody     TSH     Vitamin B12     Vitamin D Deficiency          Today's Medication Changes          These changes are accurate as of: 9/5/17  9:10 AM.  If you have any questions, ask your nurse or doctor.               Start taking these medicines.        Dose/Directions    estrogen (conjugated)-medroxyPROGESTERone 0.3-1.5 MG per tablet   Commonly known as:  PREMPRO   Used for:  Vaginal dryness, menopausal   Started by:  Lizzy Marr PA-C        Dose:  1 tablet   Take 1 tablet by mouth daily   Quantity:  30 tablet   Refills:  11            Where to get your medicines      These medications were sent to [facility] [place] [facility] [address] [place] [address]     Phone:  " [phone]     estrogen (conjugated)-medroxyPROGESTERone 0.3-1.5 MG per tablet                Primary Care Provider Office Phone # Fax #    Kristal Peña [phone]       [address] [place] [address]        Equal Access to Services     RAYSHAWN SCHNEIDER : Hadii moriah ku hadasho Soomaali, waaxda luqadaha, qaybta kaalmada adeegyada, josephine dotyjonesmemo al . So [facility] [phone].    ATENCIÓN: Si habla español, tiene a serrato disposición servicios gratuitos de asistencia lingüística. Lisa al [phone].    We comply with applicable federal civil rights laws and [place] laws. We do not discriminate on the basis of race, color, national origin, age, disability sex, sexual orientation or gender identity.            Thank you!     Thank you for choosing [facility]  for your care. Our goal is always to provide you with excellent care. Hearing back from our patients is one way we can continue to improve our services. Please take a few minutes to complete the written survey that you may receive in the mail after your visit with us. Thank you!             Your Updated Medication List - Protect others around you: Learn how to safely use, store and throw away your medicines at www.disposemymeds.org.          This list is accurate as of: 9/5/17  9:10 AM.  Always use your most recent med list.                   Brand Name Dispense Instructions for use Diagnosis    estrogen (conjugated)-medroxyPROGESTERone 0.3-1.5 MG per tablet    PREMPRO    30 tablet    Take 1 tablet by mouth daily    Vaginal dryness, menopausal

## 2017-09-06 LAB — THYROPEROXIDASE AB SERPL-ACNC: <10 IU/ML

## 2017-09-07 NOTE — PROGRESS NOTES
Melita  I have reviewed your recent labs. Here are the results:    -Liver and gallbladder tests are normal. (ALT,AST, Alk phos, bilirubin), kidney function is normal (Cr, GFR), Sodium is normal, Potassium is normal, Calcium is normal, Glucose is normal (diabetes screening test).   -Cholesterol levels (LDL,HDL, Triglycerides) are normal.  ADVISE: rechecking in 1 year.  I will advise you what to do re: this when I get your carotid ultrasound results.  -TSH (thyroid stimulating hormone) level, T4, T3 and thyroid antibodies are all normal which indicates normal thyroid function.  -Normal red blood cell (hgb) levels, normal white blood cell count and normal platelet levels.  -Hepatitis C antibody screen test shows no signs of a previous hepatitis C infection.  -Vitamin D level is low and oral supplementation should be started as this can be a big contributor to fatigue.  ADVISE: starting over the counter Vitamin D3  2000 IU - 3 tabs (6000 IU) daily for 6 weeks and then 2000 IU daily to maintain levels.  In 2 months, we can recheck this level.  -Ferritin (iron) level is normal.  -B12 level is low-normal and also can contribute to fatigue.  Start an OTC 1000 mcg supplement daily.      If you have any questions please do not hesitate to contact our office via phone ([phone]) or MyChart.    Lizzy Marr, MS, PASudhirC  [facility]3] - [place]

## 2017-09-12 ENCOUNTER — HOSPITAL ENCOUNTER (OUTPATIENT)
Dept: ULTRASOUND IMAGING | Facility: CLINIC | Age: 57
Discharge: HOME | End: 2017-09-12
Attending: PHYSICIAN ASSISTANT | Admitting: PHYSICIAN ASSISTANT
Payer: COMMERCIAL

## 2017-09-12 DIAGNOSIS — I70.0 AORTIC CALCIFICATION (H): ICD-10-CM

## 2017-09-12 PROCEDURE — 93880 EXTRACRANIAL BILAT STUDY: CPT

## 2017-09-12 NOTE — PROGRESS NOTES
Melita  Here are your recent results.  Great news!  Your carotid ultrasound was normal.  The plaque that was noted in your abdominal aorta is still present and may benefit from a statin even 1-2 times a week as we discussed.  If you would like me to send this in please send me a message.  If you have any questions please do not hesitate to contact our office via phone ([phone]) or [facility].    Lizzy Marr, MS, PA-C  [facility]

## 2017-12-13 ENCOUNTER — OFFICE VISIT (OUTPATIENT)
Dept: FAMILY MEDICINE | Facility: CLINIC | Age: 57
End: 2017-12-13
Payer: COMMERCIAL

## 2017-12-13 VITALS
TEMPERATURE: 98.3 F | OXYGEN SATURATION: 97 % | HEART RATE: 83 BPM | WEIGHT: 186 LBS | DIASTOLIC BLOOD PRESSURE: 68 MMHG | BODY MASS INDEX: 26.04 KG/M2 | HEIGHT: 71 IN | SYSTOLIC BLOOD PRESSURE: 110 MMHG

## 2017-12-13 DIAGNOSIS — J01.00 ACUTE NON-RECURRENT MAXILLARY SINUSITIS: Primary | ICD-10-CM

## 2017-12-13 PROCEDURE — 99213 OFFICE O/P EST LOW 20 MIN: CPT | Performed by: PHYSICIAN ASSISTANT

## 2017-12-13 RX ORDER — AZITHROMYCIN 250 MG/1
TABLET, FILM COATED ORAL
Qty: 6 TABLET | Refills: 0 | Status: SHIPPED | OUTPATIENT
Start: 2017-12-13 | End: 2019-10-22

## 2017-12-13 NOTE — NURSING NOTE
"Chief Complaint   Patient presents with     Sinus Problem       Initial /68 (BP Location: Left arm, Patient Position: Chair, Cuff Size: Adult Large)  Pulse 83  Temp 98.3  F (36.8  C) (Oral)  Ht 5' 11\" (1.803 m)  Wt 186 lb (84.4 kg)  LMP 05/01/2009  SpO2 97%  Breastfeeding? No  BMI 25.94 kg/m2 Estimated body mass index is 25.94 kg/(m^2) as calculated from the following:    Height as of this encounter: 5' 11\" (1.803 m).    Weight as of this encounter: 186 lb (84.4 kg).  Medication Reconciliation: complete   Csaba Mlnarik CMA    "

## 2017-12-13 NOTE — MR AVS SNAPSHOT
After Visit Summary   12/13/2017    Alfonzo Cho    MRN: [id number]           Patient Information     Date Of Birth          1960        Visit Information        Provider Department      12/13/2017 9:20 AM Fely Faria PA-C [facility]        Today's Diagnoses     Acute non-recurrent maxillary sinusitis    -  1      Care Instructions      Sinusitis (Antibiotic Treatment)    The sinuses are air-filled spaces within the bones of the face. They connect to the inside of the nose. Sinusitis is an inflammation of the tissue lining the sinus cavity. Sinus inflammation can occur during a cold. It can also be due to allergies to pollens and other particles in the air. Sinusitis can cause symptoms of sinus congestion and fullness. A sinus infection causes fever, headache and facial pain. There is often green or yellow drainage from the nose or into the back of the throat (post-nasal drip). You have been given antibiotics to treat this condition.  Home care:    Take the full course of antibiotics as instructed. Do not stop taking them, even if you feel better.    Drink plenty of water, hot tea, and other liquids. This may help thin mucus. It also may promote sinus drainage.    Heat may help soothe painful areas of the face. Use a towel soaked in hot water. Or,  the shower and direct the hot spray onto your face. Using a vaporizer along with a menthol rub at night may also help.     An expectorant containing guaifenesin may help thin the mucus and promote drainage from the sinuses.    Over-the-counter decongestants may be used unless a similar medicine was prescribed. Nasal sprays work the fastest. Use one that contains phenylephrine or oxymetazoline. First blow the nose gently. Then use the spray. Do not use these medicines more often than directed on the label or symptoms may get worse. You may also use tablets containing pseudoephedrine. Avoid products that combine  ingredients, because side effects may be increased. Read labels. You can also ask the pharmacist for help. (NOTE: Persons with high blood pressure should not use decongestants. They can raise blood pressure.)    Over-the-counter antihistamines may help if allergies contributed to your sinusitis.      Do not use nasal rinses or irrigation during an acute sinus infection, unless told to by your health care provider. Rinsing may spread the infection to other sinuses.    Use acetaminophen or ibuprofen to control pain, unless another pain medicine was prescribed. (If you have chronic liver or kidney disease or ever had a stomach ulcer, talk with your doctor before using these medicines. Aspirin should never be used in anyone under 18 years of age who is ill with a fever. It may cause severe liver damage.)    Don't smoke. This can worsen symptoms.  Follow-up care  Follow up with your healthcare provider or our staff if you are not improving within the next week.  When to seek medical advice  Call your healthcare provider if any of these occur:    Facial pain or headache becoming more severe    Stiff neck    Unusual drowsiness or confusion    Swelling of the forehead or eyelids    Vision problems, including blurred or double vision    Fever of 100.4 F (38 C) or higher, or as directed by your healthcare provider    Seizure    Breathing problems    Symptoms not resolving within 10 days  Date Last Reviewed: 4/13/2015 2000-2017 [employer]. [address] [place] [address], [place] [address]. All rights reserved. This information is not intended as a substitute for professional medical care. Always follow your healthcare professional's instructions.                Follow-ups after your visit        Who to contact     If you have questions or need follow up information about today's clinic visit or your schedule please contact [facility] directly at [phone].  Normal or non-critical lab and imaging  "results will be communicated to you by MyChart, letter or phone within 4 business days after the clinic has received the results. If you do not hear from us within 7 days, please contact the clinic through [employer] or phone. If you have a critical or abnormal lab result, we will notify you by phone as soon as possible.  Submit refill requests through [employer] or call your pharmacy and they will forward the refill request to us. Please allow 3 business days for your refill to be completed.          Additional Information About Your Visit        [employer] Information     [employer] gives you secure access to your electronic health record. If you see a primary care provider, you can also send messages to your care team and make appointments. If you have questions, please call your primary care clinic.  If you do not have a primary care provider, please call [phone] and they will assist you.        Care EveryWhere ID     This is your Care EveryWhere ID. This could be used by other organizations to access your [facility] medical records  [id number]        Your Vitals Were     Pulse Temperature Height Last Period Pulse Oximetry Breastfeeding?    83 98.3  F (36.8  C) (Oral) 5' 11\" (1.803 m) 05/01/2009 97% No    BMI (Body Mass Index)                   25.94 kg/m2            Blood Pressure from Last 3 Encounters:   12/13/17 110/68   09/05/17 99/61   04/05/17 112/78    Weight from Last 3 Encounters:   12/13/17 186 lb (84.4 kg)   09/05/17 203 lb 12.8 oz (92.4 kg)   04/05/17 205 lb (93 kg)              Today, you had the following     No orders found for display         Today's Medication Changes          These changes are accurate as of: 12/13/17 10:13 AM.  If you have any questions, ask your nurse or doctor.               Start taking these medicines.        Dose/Directions    azithromycin 250 MG tablet   Commonly known as:  ZITHROMAX   Used for:  Acute non-recurrent maxillary sinusitis   Started by:  Fely Faria PA-C "        Two tablets first day, then one tablet daily for four days.   Quantity:  6 tablet   Refills:  0            Where to get your medicines      These medications were sent to [facility] [address] [facility]  [address] [place] [id number]     Phone:  [phone]     azithromycin 250 MG tablet                Primary Care Provider Office Phone # Fax #    Kristal Peña [phone]       [address] [place] [id number]        Equal Access to Services     BRODIE SCHNEIDER : Hadii moriah ku hadasho Soomaali, waaxda luqadaha, qaybta kaalmada adeegyada, waxay karen haynoni al . So [facility] [phone].    ATENCIÓN: Si habla español, tiene a serrato disposición servicios gratuitos de asistencia lingüística. Ll[facility] [phone].    We comply with applicable federal civil rights laws and [place] laws. We do not discriminate on the basis of race, color, national origin, age, disability, sex, sexual orientation, or gender identity.            Thank you!     Thank you for choosing [facility]  for your care. Our goal is always to provide you with excellent care. Hearing back from our patients is one way we can continue to improve our services. Please take a few minutes to complete the written survey that you may receive in the mail after your visit with us. Thank you!             Your Updated Medication List - Protect others around you: Learn how to safely use, store and throw away your medicines at www.disposemymeds.org.          This list is accurate as of: 12/13/17 10:13 AM.  Always use your most recent med list.                   Brand Name Dispense Instructions for use Diagnosis    azithromycin 250 MG tablet    ZITHROMAX    6 tablet    Two tablets first day, then one tablet daily for four days.    Acute non-recurrent maxillary sinusitis

## 2017-12-13 NOTE — PROGRESS NOTES
"  SUBJECTIVE:                                                    Alfonzo Cho is a 57 year old female who presents to clinic today for the following health issues:      Acute Illness   Acute illness concerns: Sinus Problem  Onset: x1 week    Fever: no    Chills/Sweats: YES- chills few days ago    Headache (location?): YES- pounding    Sinus Pressure:no    Conjunctivitis:  YES- green matter    Ear Pain: YES- clogged    Rhinorrhea: YES- green started yesterday    Congestion: YES- chest, nasal    Sore Throat: YES- drainage -- at beginning     Cough: YES-productive of green sputum - gags from coughing fits    Wheeze: YES- little    Decreased Appetite: YES- x3-4 days    Nausea: no    Vomiting: no    Diarrhea:  no    Dysuria/Freq.: no    Fatigue/Achiness: YES- fatigued - slightly    Sick/Strep Exposure: YES-      Therapies Tried and outcome: ibuprofen - moderate relief    Symptoms started last week Tuesday.  She reports that she started with a tickle in her throat and cold/URI symptoms.  She said that now this week all the secretions are green and goop-y and she even has green goopers coming from her eyes this morning.    She has had a headache across her forehead that was worse this morning.      She is coughing up green sputum.  She says that the cough is worse in the morning, but it doesn't stop all day.      She denies fevers, but has had some chills.  She denies sweats or aches.      She reports that her  has been sick.        Problem list and histories reviewed & adjusted, as indicated.  Additional history: as documented      ROS:  Constitutional, HEENT, cardiovascular, pulmonary, GI, , musculoskeletal, neuro, skin, endocrine and psych systems are negative, except as otherwise noted.    OBJECTIVE:                                                    /68 (BP Location: Left arm, Patient Position: Chair, Cuff Size: Adult Large)  Pulse 83  Temp 98.3  F (36.8  C) (Oral)  Ht 5' 11\" (1.803 m)  Wt " 186 lb (84.4 kg)  LMP 05/01/2009  SpO2 97%  Breastfeeding? No  BMI 25.94 kg/m2  Body mass index is 25.94 kg/(m^2).  GENERAL: healthy, alert and no distress  EYES: Eyes grossly normal to inspection, PERRL and conjunctivae and sclerae normal  HENT: ear canals and TM's normal, nose and mouth without ulcers or lesions  NECK: no adenopathy, no asymmetry, masses, or scars and thyroid normal to palpation  RESP: lungs clear to auscultation - no rales, rhonchi or wheezes  CV: regular rate and rhythm, normal S1 S2, no S3 or S4, no murmur, click or rub, no peripheral edema and peripheral pulses strong  ABDOMEN: soft, nontender, no hepatosplenomegaly, no masses and bowel sounds normal  MS: no gross musculoskeletal defects noted, no edema  NEURO: Normal strength and tone, mentation intact and speech normal  PSYCH: mentation appears normal, affect normal/bright    Diagnostic Test Results:  none      ASSESSMENT/PLAN:                                                      Alfonzo was seen today for sinus problem.    Diagnoses and all orders for this visit:    Acute non-recurrent maxillary sinusitis  -     azithromycin (ZITHROMAX) 250 MG tablet; Two tablets first day, then one tablet daily for four days.      - Patient treated with Azithromycin for sinusitis.  She reports that this has worked well for her in the past, and she has an allergy to the penicillin family.    - Patient to followup if not improving.  She should be seen sooner if symptoms change or worsen in any way.     - I have discussed the patient's diagnosis, and my plan of treatment with the patient and/or family. Patient is aware to followup if symptoms do not improve.  Patient has been advised to be seen sooner or seek more immediate care if symptoms change or worsen.  Patient agrees with and understands the plan today.     See Patient Instructions        Fely Faria PA-C    [facility]

## 2017-12-13 NOTE — PATIENT INSTRUCTIONS
Sinusitis (Antibiotic Treatment)    The sinuses are air-filled spaces within the bones of the face. They connect to the inside of the nose. Sinusitis is an inflammation of the tissue lining the sinus cavity. Sinus inflammation can occur during a cold. It can also be due to allergies to pollens and other particles in the air. Sinusitis can cause symptoms of sinus congestion and fullness. A sinus infection causes fever, headache and facial pain. There is often green or yellow drainage from the nose or into the back of the throat (post-nasal drip). You have been given antibiotics to treat this condition.  Home care:    Take the full course of antibiotics as instructed. Do not stop taking them, even if you feel better.    Drink plenty of water, hot tea, and other liquids. This may help thin mucus. It also may promote sinus drainage.    Heat may help soothe painful areas of the face. Use a towel soaked in hot water. Or,  the shower and direct the hot spray onto your face. Using a vaporizer along with a menthol rub at night may also help.     An expectorant containing guaifenesin may help thin the mucus and promote drainage from the sinuses.    Over-the-counter decongestants may be used unless a similar medicine was prescribed. Nasal sprays work the fastest. Use one that contains phenylephrine or oxymetazoline. First blow the nose gently. Then use the spray. Do not use these medicines more often than directed on the label or symptoms may get worse. You may also use tablets containing pseudoephedrine. Avoid products that combine ingredients, because side effects may be increased. Read labels. You can also ask the pharmacist for help. (NOTE: Persons with high blood pressure should not use decongestants. They can raise blood pressure.)    Over-the-counter antihistamines may help if allergies contributed to your sinusitis.      Do not use nasal rinses or irrigation during an acute sinus infection, unless told to by  your health care provider. Rinsing may spread the infection to other sinuses.    Use acetaminophen or ibuprofen to control pain, unless another pain medicine was prescribed. (If you have chronic liver or kidney disease or ever had a stomach ulcer, talk with your doctor before using these medicines. Aspirin should never be used in anyone under 18 years of age who is ill with a fever. It may cause severe liver damage.)    Don't smoke. This can worsen symptoms.  Follow-up care  Follow up with your healthcare provider or our staff if you are not improving within the next week.  When to seek medical advice  Call your healthcare provider if any of these occur:    Facial pain or headache becoming more severe    Stiff neck    Unusual drowsiness or confusion    Swelling of the forehead or eyelids    Vision problems, including blurred or double vision    Fever of 100.4 F (38 C) or higher, or as directed by your healthcare provider    Seizure    Breathing problems    Symptoms not resolving within 10 days  Date Last Reviewed: 4/13/2015 2000-2017 [employer]. [address] [place] [address], [place] [id number]. All rights reserved. This information is not intended as a substitute for professional medical care. Always follow your healthcare professional's instructions.

## 2017-12-15 ENCOUNTER — TELEPHONE (OUTPATIENT)
Dept: FAMILY MEDICINE | Facility: CLINIC | Age: 57
End: 2017-12-15

## 2017-12-15 NOTE — TELEPHONE ENCOUNTER
Patient calling - stated she started a Z-Pack on Wednesday    Concern - Rash  Onset: 2 days    Description:   Started taking Z-Pack on 12/13/2017 - noticed a rash on her upper abdomen/chest on 12/14/2017 (denies itching, spreading)  Patient states that is looks like the rash has receded over last night (Patient has not taken a dose yet today, 12/15/2017)    Intensity: moderate    Progression of Symptoms:  improving    Accompanying Signs & Symptoms:  HA - started this morning 12/15/2017 (states it was due to her CPAP and not getting enough O2 throughout the night), stated it is gone now    Previous history of similar problem:   No    Precipitating factors:   Worsened by: None    Alleviating factors:  Improved by: None    Therapies Tried and outcome: None     DENIES: CP, SOB, Difficulty breathing, swelling of face/lips/tongue/hands/feet, difficulty swallowing, rash spreading, dizziness/lightheadedness, confusion, change in vision/hearing, hoarseness, fever, HA,     Patient has tried a Z-Pack in the past (~7 years ago). Denies any of these symptoms at that time    Ph.   Telephone Information:   Mobile [phone]   (OK to leave a detailed VM).     Advised patient to take OTC antihistamine during the day, benadryl @ NOC, can apply cold wash cloth. Continue Z-Pack.  Advised patient that if new or worsening symptoms appear (reviewed new & worsening symptoms) to call the clinic or go to ER - Patient stated an understanding and agreed with plan.    Message handled by Nurse Triage with [facility] - provider name: MD MARION - agree with plan above.      Carolyn Castro RN  [place] [facility]

## 2018-01-19 ENCOUNTER — TRANSFERRED RECORDS (OUTPATIENT)
Dept: HEALTH INFORMATION MANAGEMENT | Facility: CLINIC | Age: 58
End: 2018-01-19

## 2018-02-15 RX ORDER — PRAVASTATIN SODIUM 20 MG
20 TABLET ORAL
Qty: 30 TABLET | Refills: 1 | Status: SHIPPED | OUTPATIENT
Start: 2018-02-16 | End: 2018-05-18

## 2018-04-06 ENCOUNTER — OFFICE VISIT (OUTPATIENT)
Dept: FAMILY MEDICINE | Facility: CLINIC | Age: 58
End: 2018-04-06
Payer: COMMERCIAL

## 2018-04-06 VITALS — DIASTOLIC BLOOD PRESSURE: 64 MMHG | HEART RATE: 67 BPM | OXYGEN SATURATION: 99 % | SYSTOLIC BLOOD PRESSURE: 99 MMHG

## 2018-04-06 DIAGNOSIS — L71.0 PERIORAL DERMATITIS: Primary | ICD-10-CM

## 2018-04-06 DIAGNOSIS — R20.9 DISTURBANCE OF SKIN SENSATION: ICD-10-CM

## 2018-04-06 DIAGNOSIS — L29.9 LOCALIZED PRURITUS: ICD-10-CM

## 2018-04-06 DIAGNOSIS — L71.9 ROSACEA: ICD-10-CM

## 2018-04-06 PROCEDURE — 99203 OFFICE O/P NEW LOW 30 MIN: CPT | Performed by: PHYSICIAN ASSISTANT

## 2018-04-06 RX ORDER — DOXYCYCLINE 100 MG/1
CAPSULE ORAL
Qty: 60 CAPSULE | Refills: 2 | Status: SHIPPED | OUTPATIENT
Start: 2018-04-06 | End: 2019-10-22

## 2018-04-06 RX ORDER — METRONIDAZOLE 7.5 MG/G
1 LOTION TOPICAL 2 TIMES DAILY
Qty: 59 ML | Refills: 11 | Status: SHIPPED | OUTPATIENT
Start: 2018-04-06 | End: 2019-10-22

## 2018-04-06 NOTE — MR AVS SNAPSHOT
After Visit Summary   4/6/2018    Alfonzo Cho    MRN: [id number]           Patient Information     Date Of Birth          1960        Visit Information        Provider Department      4/6/2018 11:00 AM Carolyn Contreras [place] [facility] [place]'s Diagnoses     Perioral dermatitis    -  1    Rosacea          Care Instructions    Good RX      Continue to use purpose face wash  Apply Metrolotion to face 2x a day-for Rosacea and perioral dermatitis  Take Doxycycline 2x a day with food.   Okay to use Hydrocortisone OTC 2x a day for 3-5 days for burning and itching   Brands for sensitive skin:  Vanicream, CeraVe, and Cetaphil      Wear a sunscreen with at least SPF 30 on your face, ears, neck and V of the chest daily. Wear sunscreen on other areas of the body if those areas are exposed to the sun throughout the day. Sunscreens can contain physical and/or chemical blockers. Physical blockers are less likely to clog pores, these include zinc oxide and titanium dioxide. Reapply every two hour and after swimming. Sunscreen examples include Neutrogena, CeraVe, Blue Lizard, Elta MD and many others.    UV radiation  UVA radiation remains constant throughout the day and throughout the year. It is a longer wavelength than UVB and therefore penetrates deeper into the skin leading to immediate and delayed tanning, photoaging, and skin cancer. 70-80% of UVA and UVB radiation occurs between the hours of 10am-2pm.  UVB radiation  UVB radiation causes the most harmful effects and is more significant during the summer months. However, snow and ice can reflect UVB radiation leading to skin damage during the winter months as well. UVB radiation is responsible for tanning, burning, inflammation, delayed erythema (pinkness), pigmentation (brown spots), and skin cancer.   Just because you do not burn or are not developing a tan does not mean that you are not damaging your skin. A 15 minute  drive to and from work for 30 years an lead to chronic sun damage of the skin. It is important to wear a broad spectrum (both UVA and UVB) sunscreen EVERY day with at least 30 SPF. Apply to face, ears, neck and v of the chest as this is where most of our sun exposure is. Reapply sunscreen every two hours if you plan on being outside.   Ryan Dixon. Clinical Dermatology: A Color Guide to Diagnosis and Therapy. Elsevier, 2016.     Proper skin care from [facility]:    -Eliminate harsh soaps as they strip the natural oils from the skin, often resulting in dry itchy skin ( i.e. Dial, Zest, Bhavna Spring)  -Use mild soaps such as Cetaphil or Dove Sensitive Skin in the shower. You do not need to use soap on arms, legs, and trunk every time you shower unless visibly soiled.   -Avoid hot or cold showers.  -After showering, lightly dry off and apply moisturizing within 2-3 minutes. This will help trap moisture in the skin.   -Aggressive use of a moisturizer at least 1-2 times a day to the entire body (including -Vanicream, Cetaphil, Aquaphor or Cerave) and moisturize hands after every washing.  -We recommend using moisturizers that come in a tub that needs to be scooped out, not a pump. This has more of an oil base. It will hold moisture in your skin much better than a water base moisturizer. The above recommended are non-pore clogging.                     Follow-ups after your visit        Who to contact     If you have questions or need follow up information about today's clinic visit or your schedule please contact [facility] directly at [phone].  Normal or non-critical lab and imaging results will be communicated to you by MyChart, letter or phone within 4 business days after the clinic has received the results. If you do not hear from us within 7 days, please contact the clinic through MyChart or phone. If you have a critical or abnormal lab result, we will notify you by phone as soon  as possible.  Submit refill requests through [employer] or call your pharmacy and they will forward the refill request to us. Please allow 3 business days for your refill to be completed.          Additional Information About Your Visit        [employer] Information     [employer] gives you secure access to your electronic health record. If you see a primary care provider, you can also send messages to your care team and make appointments. If you have questions, please call your primary care clinic.  If you do not have a primary care provider, please call [phone] and they will assist you.        Care EveryWhere ID     This is your Care EveryWhere ID. This could be used by other organizations to access your [facility] medical records  [id number]        Your Vitals Were     Pulse Last Period Pulse Oximetry Breastfeeding?          67 05/01/2009 99% No         Blood Pressure from Last 3 Encounters:   04/06/18 99/64   12/13/17 110/68   09/05/17 99/61    Weight from Last 3 Encounters:   12/13/17 186 lb (84.4 kg)   09/05/17 203 lb 12.8 oz (92.4 kg)   04/05/17 205 lb (93 kg)              Today, you had the following     No orders found for display         Today's Medication Changes          These changes are accurate as of 4/6/18 11:22 AM.  If you have any questions, ask your nurse or doctor.               Start taking these medicines.        Dose/Directions    doxycycline monohydrate 100 MG capsule   Used for:  Perioral dermatitis   Started by:  Carolyn Contreras PA-C        Take one pill in the morning and take one pill in the evening.   Quantity:  60 capsule   Refills:  2       metroNIDAZOLE 0.75 % Lotn   Used for:  Rosacea   Started by:  Carolyn Contreras PA-C        Dose:  1 Film   Apply 1 Film topically 2 times daily   Quantity:  59 mL   Refills:  11            Where to get your medicines      These medications were sent to [facility] [place] [address]  [facility] [address]     Phone:  [phone]     doxycycline monohydrate 100 MG capsule    metroNIDAZOLE 0.75 % Lotn                Primary Care Provider Office Phone # Fax #    Kristal Peña [phone]       [facility] [address]        Equal Access to Services     [facility]: Hadii aad ku hadasho Soomaali, waaxda luqadaha, qaybta kaalmada adeegyada, waxay idiin hayaan adeeg khjonessh la'mikejulieth . So [facility] [phone].    ATENCIÓN: Si habla español, tiene a serrato disposición servicios gratuitos de asistencia lingüística. [facility] [phone].    We comply with applicable federal civil rights laws and [place] laws. We do not discriminate on the basis of race, color, national origin, age, disability, sex, sexual orientation, or gender identity.            Thank you!     [facility] your [facility]. Our goal is always to provide you with excellent care. Hearing back from our patients is one way we can continue to improve our services. Please take a few minutes to complete the written survey that you may receive in the mail after your visit with us. Thank you!             Your Updated Medication List - Protect others around you: Learn how to safely use, store and throw away your medicines at www.disposemymeds.org.          This list is accurate as of 4/6/18 11:22 AM.  Always use your most recent med list.                   Brand Name Dispense Instructions for use Diagnosis    azithromycin 250 MG tablet    ZITHROMAX    6 tablet    Two tablets first day, then one tablet daily for four days.    Acute non-recurrent maxillary sinusitis       doxycycline monohydrate 100 MG capsule     60 capsule    Take one pill in the morning and take one pill in the evening.    Perioral dermatitis       metroNIDAZOLE 0.75 % Lotn     59 mL    Apply 1 Film topically 2 times daily    Rosacea       pravastatin 20 MG tablet    PRAVACHOL     30 tablet    Take 1 tablet (20 mg) by mouth three times a week At bedtime    Aortic calcification (H)

## 2018-04-06 NOTE — PROGRESS NOTES
HPI:  Alfonzo Cho is a 57 year old year old female patient here today for rash around mouth   Duration: 8 days  Symptoms:  Itching, burning     Previous treatments: none     Alleviating/aggravating factors: none    Associated symptoms: none  Additional findings:red spots on cheeks with an occasional pimple. Flushes when drinks alcohol. Has not tried anything for this.   Patient has no other skin complaints today.  Remainder of the HPI, Meds, PMH, Allergies, FH, and SH was reviewed in chart.    Past Medical History:   Diagnosis Date     Aortic calcification (H) 2016     Asymptomatic varicose veins      Contact dermatitis and other eczema, due to unspecified cause     seasonally     Nonrheumatic aortic valve insufficiency - mild - no symptoms.     incidental finding on thoracic XR     Papanicolaou smear of cervix with atypical squamous cells of undetermined significance (ASC-US) 16    Neg HR HPV     Phlebitis and thrombophlebitis of femoral vein (deep) (superficial) (H) not candidate for HRT     popliteal after progesterone oral therapy - no other clots      Sleep apnea     now on CPAP        Past Surgical History:   Procedure Laterality Date      SECTION      S/P CSECT X 3     ENDOSCOPIC STRIPPING VEIN(S)  92,02    S/P VEIN STRIPPING      HC COLONOSCOPY THRU STOMA, DIAGNOSTIC  2009     [place]      TUBAL LIGATION  1995    with 3rd         Family History   Problem Relation Age of Onset     Neurologic Disorder Father      ALS     Hypothyroidism Father      Glaucoma Father      glaucoma     OSTEOPOROSIS Mother      on Fosamax      Uterine Cancer Mother 78     endometrial cancer s/p hysterectomy at age 80 - still alive      Esophageal Cancer Maternal Uncle      Esophageal Cancer Maternal Uncle      C.A.D. 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 No     Social History Narrative    calcium - gets 2-3 large dairy servings /day.     colonoscopy - done 9/2009 = normal - repeat in 10 years     sun precautions - discussed     mammogram - yearly     Td booster - 3/20/2000 and today July 28, 2009     pneumovax -at age 58     DEXA -consider next year     stool hemoccults - every year after age 40    ASA- start 81mg asa daily     mulvitamin - encouraged        Outpatient Encounter Prescriptions as of 4/6/2018   Medication Sig Dispense Refill     doxycycline monohydrate 100 MG capsule Take one pill in the morning and take one pill in the evening. 60 capsule 2     metroNIDAZOLE 0.75 % LOTN Apply 1 Film topically 2 times daily 59 mL 11     pravastatin (PRAVACHOL) 20 MG tablet Take 1 tablet (20 mg) by mouth three times a week At bedtime (Patient not taking: Reported on 4/6/2018) 30 tablet 1     azithromycin (ZITHROMAX) 250 MG tablet Two tablets first day, then one tablet daily for four days. (Patient not taking: Reported on 4/6/2018) 6 tablet 0     No facility-administered encounter medications on file as of 4/6/2018.        Review Of Systems:  Skin: As above  Eyes: negative  Ears/Nose/Throat: negative  Respiratory: No shortness of  breath, dyspnea on exertion, cough, or hemoptysis  Cardiovascular: negative  Gastrointestinal: negative  Genitourinary: negative  Musculoskeletal: negative  Neurologic: negative  Psychiatric: negative  Hematologic/Lymphatic/Immunologic: negative  Endocrine: negative      Objective:     BP 99/64  Pulse 67  LMP 05/01/2009  SpO2 99%  Breastfeeding? No  Eyes: Conjunctivae/lids: Normal   ENT: Lips:  Normal  MSK: Normal  Pulm: Breathing Normal  Neuro/Psych: Orientation: Normal; Mood/Affect: Normal, NAD, WDWN  Following areas examined: face, neck, and hands  Findings:    1) Red papules and pink/red scaly patches in a perioral distrubution  2) red smooth blanchable linear macules on bilateral cheeks  Assessment and Plan:  1) Perioral dermatitis  Continue to use purpose face wash  Apply Metrolotion to face 2x a day  Take Doxycycline 2x a day with food.   Okay to use Hydrocortisone OTC 2x a day for 3-5 days for burning and itching     Side effects of Doxycycline: sun sensitivity, stomach upset, dizziness and heartburn. If you experience a sudden onset of rash or severe unusual headache, discontinue the medication and contact your clinician immediately.     2) Rosacea  Apply Metrolotion to face 2x a day  Wear sunscreen daily.   Brands for sensitive skin:  Vanicream, CeraVe, and Cetaphil      Rosacea and Perioral dermatitis handout given    Follow up in 6 weeks

## 2018-04-06 NOTE — NURSING NOTE
"Chief Complaint   Patient presents with     Derm Problem     rash on chin       Initial BP 99/64  Pulse 67  LMP 05/01/2009  SpO2 99%  Breastfeeding? No Estimated body mass index is 25.94 kg/(m^2) as calculated from the following:    Height as of 12/13/17: 5' 11\" (1.803 m).    Weight as of 12/13/17: 186 lb (84.4 kg).  Medication Reconciliation: complete  "

## 2018-04-06 NOTE — PATIENT INSTRUCTIONS
Good RX      Continue to use purpose face wash  Apply Metrolotion to face 2x a day-for Rosacea and perioral dermatitis  Take Doxycycline 2x a day with food.   Okay to use Hydrocortisone OTC 2x a day for 3-5 days for burning and itching   Brands for sensitive skin:  Vanicream, CeraVe, and Cetaphil      Wear a sunscreen with at least SPF 30 on your face, ears, neck and V of the chest daily. Wear sunscreen on other areas of the body if those areas are exposed to the sun throughout the day. Sunscreens can contain physical and/or chemical blockers. Physical blockers are less likely to clog pores, these include zinc oxide and titanium dioxide. Reapply every two hour and after swimming. Sunscreen examples include Neutrogena, CeraVe, Blue Lizard, Elta MD and many others.    UV radiation  UVA radiation remains constant throughout the day and throughout the year. It is a longer wavelength than UVB and therefore penetrates deeper into the skin leading to immediate and delayed tanning, photoaging, and skin cancer. 70-80% of UVA and UVB radiation occurs between the hours of 10am-2pm.  UVB radiation  UVB radiation causes the most harmful effects and is more significant during the summer months. However, snow and ice can reflect UVB radiation leading to skin damage during the winter months as well. UVB radiation is responsible for tanning, burning, inflammation, delayed erythema (pinkness), pigmentation (brown spots), and skin cancer.   Just because you do not burn or are not developing a tan does not mean that you are not damaging your skin. A 15 minute drive to and from work for 30 years an lead to chronic sun damage of the skin. It is important to wear a broad spectrum (both UVA and UVB) sunscreen EVERY day with at least 30 SPF. Apply to face, ears, neck and v of the chest as this is where most of our sun exposure is. Reapply sunscreen every two hours if you plan on being outside.   Ryan Dixon. Clinical Dermatology: A  Color Guide to Diagnosis and Therapy. Elsevier, 2016.     Proper skin care from [facility]:    -Eliminate harsh soaps as they strip the natural oils from the skin, often resulting in dry itchy skin ( i.e. Dial, Zest, Jordanian Spring)  -Use mild soaps such as Cetaphil or Dove Sensitive Skin in the shower. You do not need to use soap on arms, legs, and trunk every time you shower unless visibly soiled.   -Avoid hot or cold showers.  -After showering, lightly dry off and apply moisturizing within 2-3 minutes. This will help trap moisture in the skin.   -Aggressive use of a moisturizer at least 1-2 times a day to the entire body (including -Vanicream, Cetaphil, Aquaphor or Cerave) and moisturize hands after every washing.  -We recommend using moisturizers that come in a tub that needs to be scooped out, not a pump. This has more of an oil base. It will hold moisture in your skin much better than a water base moisturizer. The above recommended are non-pore clogging.

## 2018-04-06 NOTE — LETTER
2018         RE: Alfonzo Cho  [address] [facility] [address]        Dear Colleague,    Thank you for [place] your [place], [place]fonzo Cho[place] [facility] [place] Please see a copy of my visit note below.    HPI:  Alfonzo Cho is a 57 year old year old female patient here today for rash around mouth   Duration: 8 days  Symptoms:  Itching, burning     Previous treatments: none     Alleviating/aggravating factors: none    Associated symptoms: none  Additional findings:red spots on cheeks with an occasional pimple. Flushes when drinks alcohol. Has not tried anything for this.   Patient has no other skin complaints today.  Remainder of the HPI, Meds, PMH, Allergies, FH, and SH was reviewed in chart.    Past Medical History:   Diagnosis Date     Aortic calcification (H) 2016     Asymptomatic varicose veins      Contact dermatitis and other eczema, due to unspecified cause     seasonally     Nonrheumatic aortic valve insufficiency - mild - no symptoms.     incidental finding on thoracic XR     Papanicolaou smear of cervix with atypical squamous cells of undetermined significance (ASC-US) 16    Neg HR HPV     Phlebitis and thrombophlebitis of femoral vein (deep) (superficial) (H) not candidate for HRT     popliteal after progesterone oral therapy - no other clots      Sleep apnea     now on CPAP        Past Surgical History:   Procedure Laterality Date      SECTION      S/P CSECT X 3     ENDOSCOPIC STRIPPING VEIN(S)  92,02    S/P VEIN STRIPPING      HC COLONOSCOPY THRU STOMA, DIAG[place]STIC  [place] in [place]      TUBAL LIGATION  1995    with 3rd         Family History   Problem Relation Age of Onset     Neurologic Disorder Father      ALS     Hypothyroidism Father      Glaucoma Father      glaucoma     OSTEOPOROSIS Mother      on Fosamax      Uterine Cancer Mother 78     endometrial cancer s/p hysterectomy at age 80 - still  alive      Esophageal Cancer Maternal Uncle      Esophageal Cancer Maternal Uncle      C.A.D. 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 No     Social History Narrative    calcium - gets 2-3 large dairy servings /day.     colonoscopy - done 2009 = normal - repeat in 10 years     sun precautions - discussed     mammogram - yearly     Td booster - 3/20/2000 and today 2009     pneumovax -at age 58     DEXA -consider next year     stool hemoccults - every year after age 40    ASA- start 81mg asa daily     mulvitamin - encouraged        Outpatient Encounter Prescriptions as of 2018   Medication Sig Dispense Refill     doxycycline monohydrate 100 MG capsule Take one pill in the morning and take one pill in the evening. 60 capsule 2     metroNIDAZOLE 0.75 % LOTN Apply 1 Film topically 2 times daily 59 mL 11     pravastatin (PRAVACHOL) 20 MG tablet Take 1 tablet (20 mg) by mouth three times a week At bedtime (Patient not taking: Reported on 2018) 30 tablet 1     azithromycin (ZITHROMAX) 250 MG tablet Two tablets first day, then one tablet daily for four  days. (Patient not taking: Reported on 4/6/2018) 6 tablet 0     No facility-administered encounter medications on file as of 4/6/2018.        Review Of Systems:  Skin: As above  Eyes: negative  Ears/Nose/Throat: negative  Respiratory: No shortness of breath, dyspnea on exertion, cough, or hemoptysis  Cardiovascular: negative  Gastrointestinal: negative  Genitourinary: negative  Musculoskeletal: negative  Neurologic: negative  Psychiatric: negative  Hematologic/Lymphatic/Immunologic: negative  Endocrine: negative      Objective:     BP 99/64  Pulse 67  LMP 05/01/2009  SpO2 99%  Breastfeeding? No  Eyes: Conjunctivae/lids: Normal   ENT: Lips:  Normal  MSK: Normal  Pulm: Breathing Normal  Neuro/Psych: Orientation: Normal; Mood/Affect: Normal, NAD, WDWN  Following areas examined: face, neck, and hands  Findings:    1) Red papules and pink/red scaly patches in a perioral distrubution  2) red smooth blanchable linear macules on bilateral cheeks  Assessment and Plan:  1) Perioral dermatitis  Continue to use purpose face wash  Apply Metrolotion to face 2x a day  Take Doxycycline 2x a day with food.   Okay to use Hydrocortisone OTC 2x a day for 3-5 days for burning and itching     Side effects of Doxycycline: sun sensitivity, stomach upset, dizziness and heartburn. If you experience a sudden onset of rash or severe unusual headache, discontinue the medication and contact your clinician immediately.     2) Rosacea  Apply Metrolotion to face 2x a day  Wear sunscreen daily.   Brands for sensitive skin:  Vanicream, CeraVe, and Cetaphil      Rosacea and Perioral dermatitis handout given    Follow up in 6 weeks      Again, thank you for allowing me to participate in the care of your patient.        Sincerely,        Carolyn Contreras PA-C

## 2018-04-18 ENCOUNTER — HOSPITAL ENCOUNTER (OUTPATIENT)
Dept: MAMMOGRAPHY | Facility: CLINIC | Age: 58
Discharge: HOME | End: 2018-04-18
Attending: PHYSICIAN ASSISTANT | Admitting: PHYSICIAN ASSISTANT
Payer: COMMERCIAL

## 2018-04-18 DIAGNOSIS — Z12.31 VISIT FOR SCREENING MAMMOGRAM: ICD-10-CM

## 2018-04-18 PROCEDURE — 77067 SCR MAMMO BI INCL CAD: CPT

## 2018-04-18 NOTE — PROGRESS NOTES
Melita  Here are your recent results.  They are normal.  If you have any questions please do not hesitate to contact our office via phone ([phone]) or MyChart.    Lizzy Marr MS, PA-C  [facility]

## 2018-05-18 ENCOUNTER — OFFICE VISIT (OUTPATIENT)
Dept: FAMILY MEDICINE | Facility: CLINIC | Age: 58
End: 2018-05-18
Payer: COMMERCIAL

## 2018-05-18 VITALS — SYSTOLIC BLOOD PRESSURE: 96 MMHG | DIASTOLIC BLOOD PRESSURE: 63 MMHG | OXYGEN SATURATION: 99 % | HEART RATE: 72 BPM

## 2018-05-18 DIAGNOSIS — L71.9 ROSACEA: ICD-10-CM

## 2018-05-18 DIAGNOSIS — L71.0 PERIORAL DERMATITIS: Primary | ICD-10-CM

## 2018-05-18 PROCEDURE — 99213 OFFICE O/P EST LOW 20 MIN: CPT | Performed by: PHYSICIAN ASSISTANT

## 2018-05-18 NOTE — LETTER
2018         RE: Alfonzo Cho  [address] [facility] [address]        Dear Colleague,    Thank you for referring your patient, Alfonzo Cho, to the [facility] [place] Please see a copy of my visit note below.    HPI:  Alfonzo Cho is a 58 year old year old female patient here today for 6 week follow up perioral dermatitis with great improvement. Itching, dryness, and redness gone.    Previous treatments: doxy BID and metrolotion BID     Alleviating/aggravating factors: no additional    Associated symptoms: none  Additional findings:new pimples on nose, pt wears a mask at work. No tx tried.   Patient has no other skin complaints today.  Remainder of the HPI, Meds, PMH, Allergies, FH, and SH was reviewed in chart.      Past Medical History:   Diagnosis Date     Aortic calcification (H) 2016     Asymptomatic varicose veins      Contact dermatitis and other eczema, due to unspecified cause     seasonally     Nonrheumatic aortic valve insufficiency - mild - no symptoms.     incidental finding on thoracic XR     Papanicolaou smear of cervix with atypical squamous cells of undetermined significance (ASC-US) 16    Neg HR HPV     Phlebitis and thrombophlebitis of femoral vein (deep) (superficial) (H) not candidate for HRT     popliteal after progesterone oral therapy - no other clots      Sleep apnea     now on CPAP        Past Surgical History:   Procedure Laterality Date      SECTION      S/P CSECT X 3     ENDOSCOPIC STRIPPING VEIN(S)  92,02    S/P VEIN STRIPPING      HC COLONOSCOPY THRU STOMA, DIAGNOSTIC  2009     [place] gastro - normal - repeat in 2019      TUBAL LIGATION  1995    with 3rd         Family History   Problem Relation Age of Onset     Neurologic Disorder Father      ALS     Hypothyroidism Father      Glaucoma Father      glaucoma     OSTEOPOROSIS Mother      on Fosamax      Uterine Cancer Mother 78     endometrial cancer s/p hysterectomy at  age 80 - still alive      Esophageal Cancer Maternal Uncle      Esophageal Cancer Maternal Uncle      C.A.D. 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 No     Social History Narrative    calcium - gets 2-3 large dairy servings /day.     colonoscopy - done 2009 = normal - repeat in 10 years     sun precautions - discussed     mammogram - yearly     Td booster - 3/20/2000 and today 2009     pneumovax -at age 58     DEXA -consider next year     stool hemoccults - every year after age 40    ASA- start 81mg asa daily     mulvitamin - encouraged        Outpatient Encounter Prescriptions as of 2018   Medication Sig Dispense Refill     doxycycline monohydrate 100 MG capsule Take one pill in the morning and take one pill in the evening. 60 capsule 2     metroNIDAZOLE 0.75 % LOTN Apply 1 Film topically 2 times daily 59 mL 11     azithromycin (ZITHROMAX) 250 MG tablet Two tablets first day, then one tablet daily for four days. (Patient not taking: Reported on 2018) 6 tablet 0     [DISCONTINUED] pravastatin (PRAVACHOL) 20 MG tablet Take 1 tablet (20 mg) by  mouth three times a week At bedtime (Patient not taking: Reported on 4/6/2018) 30 tablet 1     No facility-administered encounter medications on file as of 5/18/2018.        Review Of Systems:  Skin: As above  Eyes: negative  Ears/Nose/Throat: negative  Respiratory: No shortness of breath, dyspnea on exertion, cough, or hemoptysis  Cardiovascular: negative  Gastrointestinal: negative  Genitourinary: negative  Musculoskeletal: negative  Neurologic: negative  Psychiatric: negative  Hematologic/Lymphatic/Immunologic: negative  Endocrine: negative      Objective:     BP 96/63  Pulse 72  LMP 05/01/2009  SpO2 99%  Breastfeeding? No  Eyes: Conjunctivae/lids: Normal   ENT: Lips:  Normal  MSK: Normal  Cardiovascular: Peripheral edema none  Pulm: Breathing Normal  Neuro/Psych: Orientation: Normal; Mood/Affect: Normal, NAD, WDWN  Following areas examined: face, neck, hands  Findings:    1) few pink papules on nasal tip    Assessment and Plan:  1) perioral dermatitis  Begin tapering doxy to 1 pill every day x 2 weeks. Then pill every other day x 1-2 weeks.   Continue metronidazole BID until finished with doxy.    2) Acne rosacea  Continue the metronidazole 2x a day can taper to 1x a day if not flaring  Sunscreen daily.    Differin gel:  Apply a pea-sized amount to face. Start every 3-4 nights working up to every night.     Follow up in 1 year for rosacea or if perioral dermatitis not controlled.       Again, thank you for allowing me to participate in the care of your patient.        Sincerely,        Carolyn Contreras PA-C

## 2018-05-18 NOTE — MR AVS SNAPSHOT
After Visit Summary   5/18/2018    Alfonzo Cho    MRN: [id number]           Patient Information     Date Of Birth          1960        Visit Information        Provider Department      5/18/2018 10:00 AM Carolyn Contreras PA-C [facility]        Care Instructions    Begin tapering doxy to 1 pill every day x 2 weeks. Then pill every other day x 1-2 weeks.     Continue the metronidazole 2x a day then taper to 1x a day for rosacea.   Sunscreen daily.    Acne rosacea:  Differin gel:  Apply a pea-sized amount to face. Start every 3-4 nights working up to every night.               Follow-ups after your visit        Who to contact     If you have questions or need follow up information about today's clinic visit or your schedule please contact [facility] directly at [phone].  Normal or non-critical lab and imaging results will be communicated to you by MyChart, letter or phone within 4 business days after the clinic has received the results. If you do not hear from us within 7 days, please contact the clinic through [facility] or phone. If you have a critical or abnormal lab result, we will notify you by phone as soon as possible.  Submit refill requests through MyDoc or call your pharmacy and they will forward the refill request to us. Please allow 3 business days for your refill to be completed.          Additional Information About Your Visit        MyChart Information     MyDoc gives you secure access to your electronic health record. If you see a primary care provider, you can also send messages to your care team and make appointments. If you have questions, please call your primary care clinic.  If you do not have a primary care provider, please call [phone] and they will assist you.        Care EveryWhere ID     This is your Care EveryWhere ID. This could be used by other organizations to access your [place] medical records  [id number]         Your Vitals Were     Pulse Last Period Pulse Oximetry Breastfeeding?          72 05/01/2009 99% No         Blood Pressure from Last 3 Encounters:   05/18/18 96/63   04/06/18 99/64   12/13/17 110/68    Weight from Last 3 Encounters:   12/13/17 186 lb (84.4 kg)   09/05/17 203 lb 12.8 oz (92.4 kg)   04/05/17 205 lb (93 kg)              Today, you had the following     No orders found for display       Primary Care Provider Office Phone # Fax #    Kristal Peña [phone] [address] [facility] [address]        Equal Access to Services     BRODIE SCHNEIDER : Hadii moriah arguello hadasho Sokalyani, waaxda luqadaha, qaybta kaalmada adeegyada, josephine al . So [facility] [phone].    ATENCIÓN: Si habla español, tiene a serrato disposición servicios gratuitos de asistencia lingüística. Llame al [phone].    We comply with applicable federal civil rights laws and [place] laws. We do not discriminate on the basis of race, color, national origin, age, disability, sex, sexual orientation, or gender identity.            Thank you!     Thank you [place] [facility] [place]. Our goal is always to provide you with excellent care. Hearing back from our patients is one way we can continue to improve our services. Please take a few minutes to complete the written survey that you may receive in the mail after your visit with us. Thank you!             Your Updated Medication List - Protect others around you: Learn how to safely use, store and throw away your medicines at www.disposemymeds.org.          This list is accurate as of 5/18/18 10:11 AM.  Always use your most recent med list.                   Brand Name Dispense Instructions for use Diagnosis    azithromycin 250 MG tablet    ZITHROMAX    6 tablet    Two tablets first day, then one tablet daily for four days.    Acute non-recurrent maxillary sinusitis       doxycycline monohydrate 100  MG capsule     60 capsule    Take one pill in the morning and take one pill in the evening.    Perioral dermatitis       metroNIDAZOLE 0.75 % Lotn     59 mL    Apply 1 Film topically 2 times daily    Rosacea

## 2018-05-18 NOTE — PATIENT INSTRUCTIONS
Begin tapering doxy to 1 pill every day x 2 weeks. Then pill every other day x 1-2 weeks.     Continue the metronidazole 2x a day then taper to 1x a day for rosacea.   Sunscreen daily.    Acne rosacea:  Differin gel:  Apply a pea-sized amount to face. Start every 3-4 nights working up to every night.

## 2018-05-18 NOTE — PROGRESS NOTES
HPI:  Alfonzo Cho is a 58 year old year old female patient here today for 6 week follow up perioral dermatitis with great improvement. Itching, dryness, and redness gone.    Previous treatments: doxy BID and metrolotion BID     Alleviating/aggravating factors: no additional    Associated symptoms: none  Additional findings:new pimples on nose, pt wears a mask at work. No tx tried.   Patient has no other skin complaints today.  Remainder of the HPI, Meds, PMH, Allergies, FH, and SH was reviewed in chart.      Past Medical History:   Diagnosis Date     Aortic calcification (H) 2016     Asymptomatic varicose veins      Contact dermatitis and other eczema, due to unspecified cause     seasonally     Nonrheumatic aortic valve insufficiency - mild - no symptoms.     incidental finding on thoracic XR     Papanicolaou smear of cervix with atypical squamous cells of undetermined significance (ASC-US) 16    Neg HR HPV     Phlebitis and thrombophlebitis of femoral vein (deep) (superficial) (H) not candidate for HRT     popliteal after progesterone oral therapy - no other clots      Sleep apnea     now on CPAP        Past Surgical History:   Procedure Laterality Date      SECTION      S/P CSECT X 3     ENDOSCOPIC STRIPPING VEIN(S)  92,02    S/P VEIN STRIPPING      HC COLONOSCOPY THRU STOMA, DIAGNOSTIC  2009     [place] in [place]      TUBAL LIGATION      with 3rd         Family History   Problem Relation Age of Onset     Neurologic Disorder Father      ALS     Hypothyroidism Father      Glaucoma Father      glaucoma     OSTEOPOROSIS Mother      on Fosamax      Uterine Cancer Mother 78     endometrial cancer s/p hysterectomy at age 80 - still alive      Esophageal Cancer Maternal Uncle      Esophageal Cancer Maternal Uncle      C.A.D. 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 No     Social History Narrative    calcium - gets 2-3 large dairy servings /day.     colonoscopy - done 9/2009 = normal - repeat in 10 years     sun precautions - discussed     mammogram - yearly     Td booster - 3/20/2000 and today July 28, 2009     pneumovax -at age 58     DEXA -consider next year     stool hemoccults - every year after age 40    ASA- start 81mg asa daily     mulvitamin - encouraged        Outpatient Encounter Prescriptions as of 5/18/2018   Medication Sig Dispense Refill     doxycycline monohydrate 100 MG capsule Take one pill in the morning and take one pill in the evening. 60 capsule 2     metroNIDAZOLE 0.75 % LOTN Apply 1 Film topically 2 times daily 59 mL 11     azithromycin (ZITHROMAX) 250 MG tablet Two tablets first day, then one tablet daily for four days. (Patient not taking: Reported on 4/6/2018) 6 tablet 0     [DISCONTINUED] pravastatin (PRAVACHOL) 20 MG tablet Take 1 tablet (20 mg) by mouth three times a week At bedtime (Patient not taking: Reported on 4/6/2018) 30 tablet 1     No facility-administered encounter medications on file as of 5/18/2018.        Review Of Systems:  Skin: As above  Eyes: negative  Ears/Nose/Throat:  negative  Respiratory: No shortness of breath, dyspnea on exertion, cough, or hemoptysis  Cardiovascular: negative  Gastrointestinal: negative  Genitourinary: negative  Musculoskeletal: negative  Neurologic: negative  Psychiatric: negative  Hematologic/Lymphatic/Immunologic: negative  Endocrine: negative      Objective:     BP 96/63  Pulse 72  LMP 05/01/2009  SpO2 99%  Breastfeeding? No  Eyes: Conjunctivae/lids: Normal   ENT: Lips:  Normal  MSK: Normal  Cardiovascular: Peripheral edema none  Pulm: Breathing Normal  Neuro/Psych: Orientation: Normal; Mood/Affect: Normal, NAD, WDWN  Following areas examined: face, neck, hands  Findings:    1) few pink papules on nasal tip    Assessment and Plan:  1) perioral dermatitis  Begin tapering doxy to 1 pill every day x 2 weeks. Then pill every other day x 1-2 weeks.   Continue metronidazole BID until finished with doxy.    2) Acne rosacea  Continue the metronidazole 2x a day can taper to 1x a day if not flaring  Sunscreen daily.    Differin gel:  Apply a pea-sized amount to face. Start every 3-4 nights working up to every night.     Follow up in 1 year for rosacea or if perioral dermatitis not controlled.

## 2018-10-17 ENCOUNTER — TRANSFERRED RECORDS (OUTPATIENT)
Dept: HEALTH INFORMATION MANAGEMENT | Facility: CLINIC | Age: 58
End: 2018-10-17

## 2018-10-18 ENCOUNTER — HOSPITAL ENCOUNTER (OUTPATIENT)
Dept: GENERAL RADIOLOGY | Facility: CLINIC | Age: 58
Discharge: HOME | End: 2018-10-18
Attending: OTOLARYNGOLOGY | Admitting: OTOLARYNGOLOGY
Payer: COMMERCIAL

## 2018-10-18 DIAGNOSIS — R13.10 DYSPHAGIA, UNSPECIFIED TYPE: ICD-10-CM

## 2018-10-18 PROCEDURE — 25500045 ZZH RX 255: Performed by: OTOLARYNGOLOGY

## 2018-10-18 PROCEDURE — 74220 X-RAY XM ESOPHAGUS 1CNTRST: CPT

## 2018-10-18 RX ADMIN — ANTACID/ANTIFLATULENT 4 G: 380; 550; 10; 10 GRANULE, EFFERVESCENT ORAL at 10:15

## 2018-10-19 ENCOUNTER — OFFICE VISIT (OUTPATIENT)
Dept: VASCULAR SURGERY | Facility: CLINIC | Age: 58
End: 2018-10-19
Payer: COMMERCIAL

## 2018-10-19 ENCOUNTER — TRANSFERRED RECORDS (OUTPATIENT)
Dept: HEALTH INFORMATION MANAGEMENT | Facility: CLINIC | Age: 58
End: 2018-10-19

## 2018-10-19 DIAGNOSIS — Z53.9 ERRONEOUS ENCOUNTER--DISREGARD: Primary | ICD-10-CM

## 2018-10-19 PROCEDURE — 99204 OFFICE O/P NEW MOD 45 MIN: CPT | Performed by: SURGERY

## 2018-10-19 NOTE — PROGRESS NOTES
[facility] Consultation    Alfonzo Cho is a 58-year-old female with whom I am familiar.  He has rather advanced bilateral lower extremity venous stasis disease and is undergone multiple treatments in the past.  She was initially seen in 2001 after having had bilateral varicose vein stripping in 1992.  She has since had multiple procedures on both legs to treat recurrent right lower extremity pain, varicose veins with complications of superficial thrombophlebitis and hemorrhage.  I last saw her in September 2012.    She presents at this time with a 2-week history of recurrent superficial thrombophlebitis of a varicosity of her right leg.  She has not had pleuritic chest pain, shortness of breath or any other signs or symptoms of deep vein thrombosis or pulmonary embolism.    Past medical history  Medical: Aortic valve insufficiency-asymptomatic, gastroesophageal reflux disease  Surgical: Multiple vein operations bilateral lower extremities including right great and small saphenous vein and multiple perforators as well as, possibly, left great saphenous vein treatment.    Medicines: Doxycycline, Advil as needed and Zyrtec as needed    Allergies: Penicillin    Social history: She is a non-smoker and has maybe 1-2 drinks of alcohol a week.    12 point review of systems was completed and reviewed.  It is significant for 30 pound weight loss, arthritis, pruritus    Physical exam  General: Pleasant female no acute distress.  She is 5 feet 11 inches tall and weighs 186 pounds  HEENT: Normocephalic, atraumatic.  EOMI.  External ears and nose are normal.  She wears corrective lenses.  Respiratory: Normal respiratory effort  Cardiovascular: Pulse is regular  Psychiatric: Judgment, insight, mood and affect are normal  Musculoskeletal: Gait and station appear normal.  The joints of her fingers and toes are without significant deformity.  There is no cyanosis of her nailbeds.  Neurologic: Grossly normal  Extremities: The right  lower extremity she has firm lipodermatosclerosis from the mid leg to the ankle.  There are numerous 8-10 mm varicosities coursing down the medial leg.  There is an area of firm, indurated, erythematous vein in the proximal medial right calf consistent with recent superficial thumb phlebitis.  Right ankle is rather shrunken from her advanced lipodermatosclerosis.  In the left lower extremity she has lipodermatosclerosis as well but to a lesser extent when compared to the right.  There are 4-6 mm varicosities scattered about the left medial leg.  There is no significant edema of her ankle.  There are 4-6 mm varicosities coursing down the left medial thigh.  She has 2-3+ dorsalis pedis and posterior tibial pulses bilaterally.    Impression  Bilateral CEAP chronic venous insufficiency with4 recurrent superficial thrombophlebitis of her right lower extremity.  She has been wearing compression hose, taking ibuprofen and elevating her leg as much as possible.  She has been using warm compresses to the area phlebitis on the right calf stating that his very slow to resolve.    We discussed the absolute necessity of daily compression for her legs.  Given the advanced stasis changes, I would recommend venous duplex ultrasound bilaterally.  She will return in the near future.    ARLETH Argueta MD

## 2018-11-12 ENCOUNTER — APPOINTMENT (OUTPATIENT)
Dept: VASCULAR SURGERY | Facility: CLINIC | Age: 58
End: 2018-11-12
Payer: COMMERCIAL

## 2018-11-12 ENCOUNTER — OFFICE VISIT (OUTPATIENT)
Dept: VASCULAR SURGERY | Facility: CLINIC | Age: 58
End: 2018-11-12
Payer: COMMERCIAL

## 2018-11-12 DIAGNOSIS — Z53.9 ERRONEOUS ENCOUNTER--DISREGARD: Primary | ICD-10-CM

## 2018-11-12 PROCEDURE — 93970 EXTREMITY STUDY: CPT | Performed by: SURGERY

## 2018-11-12 PROCEDURE — 99213 OFFICE O/P EST LOW 20 MIN: CPT | Performed by: SURGERY

## 2018-11-12 NOTE — PROGRESS NOTES
[facility] Office Note    Alfonzo Cho returns in follow-up of CEAP 4 chronic venous insufficiency with recurrent superficial thrombophlebitis of her right lower extremity.  She has had multiple procedures on both legs and returned today for bilateral lower extremity venous duplex ultrasound.    Duplex ultrasound of her right lower extremity deep veins reveals no evidence of deep vein thrombosis.  Her common femoral, mid to distal femoral and popliteal veins are incompetent.  Her proximal femoral vein is competent.  Her right great saphenous vein has recanalized from the saphenofemoral junction to the level of the knee with chronic nonocclusive thrombus noted in the proximal thigh and at the level of the knee.  The great saphenous vein is dilated to 8.5 mm in diameter with a reflux time of 8 seconds.  There are multiple incompetent vein branches coursing from the distal right saphenous vein, the largest measuring 5.3 mm diameter with reflux time of 3.7 seconds.  The below-knee great saphenous vein is not visualized.  The right small saphenous vein is patent at the mid calf level but is quite tortuous.  It is approached by a tributary from the great saphenous system medially.  The saphenopopliteal junction appears competent as is the Vein of Giacomini and the right anterior accessory saphenous vein.  She has a 3.5 mm diameter incompetent  3 cm below the medial malleolus communicating with an incompetent vein branch, a 3.4 mm diameter incompetent  20 cm from the right medial malleolus communicating with an incompetent vein branch.  Duplex ultrasound of the left lower extremity deep veins reveals no evidence of deep vein thrombosis.  The left common femoral and proximal femoral veins are incompetent but the remaining deep vein valves are competent.  The left great saphenous vein is surgically absent 25 mm beyond the saphenofemoral junction to the ankle.  There is a 4.8 mm diameter incompetent vein  branch with reflux time of 3.4 seconds coursing from the left saphenofemoral junction down the medial thigh and onto the medial aspect of the leg.  This communicates with a 3.8 mm diameter incompetent  6 cm from the left medial malleolus.  The left small saphenous vein is incompetent with reflux time of 3.2 seconds and a maximal diameter of 3.2 mm.  It has a single small incompetent vein branch coursing from it.  The left anterior accessory saphenous vein is not visualized 23 mm from the saphenofemoral junction but has a varicosity coursing from its origin measuring 2.7 mm in diameter.  There are 2 small incompetent perforators on the lateral left leg.    Impression  Her most significant disease is in the right lower extremity.  She has recurrent superficial thrombophlebitis secondary to recanalization of the right great saphenous vein with large incompetent tributaries coursing from it.  With her advanced venous stasis changes and lipodermatosclerosis, I feel the right great saphenous vein should be treated.  She is not a candidate for phlebectomies given her lipodermatosclerosis in the area of the tributaries but would likely benefit from sclerotherapy of these large tributaries on the medial aspect of the right leg.    Details of radiofrequency ablation of the right great saphenous vein including risks of bleeding, infection, nerve injury, scarring, hyperpigmentation, deep vein thrombosis, recanalization of the saphenous vein and failure of the varicose veins on the leg to resolve with simple ablation were discussed.  Risk of sclerotherapy including allergic reaction, deep vein thrombosis, superficial phlebitis and hyperpigmentation were discussed.  I do not feel it is necessary to address the perforators on her medial leg at this time.    Estimates were given and questions were answered.    ARLETH Argueta MD

## 2018-11-12 NOTE — MR AVS SNAPSHOT
After Visit Summary   11/12/2018    Alfonzo Cho    MRN: [id number]           Patient Information     Date Of Birth          1960        Visit Information        Provider Department      11/12/2018 2:45 PM Aleksander Argueta MD [facility]      Today's Diagnoses     ERRONEOUS ENCOUNTER--DISREGARD    -  1       Follow-ups after your visit        Your next 10 appointments already scheduled     Nov 30, 2018 11:30 AM CST   VNUS Ultrasound 72 Hour with  VEIN VASCULAR LAB   [facility] ([facility])    [address] [facility] [address]   [phone]            Nov 30, 2018 12:00 PM CST   Rewrap 48 Hour with Aleksander Argueta MD   [facility] ([facility]    [address] [facility] [address]   [phone]            Nov 30, 2018 12:15 PM CST   Ultrasound Guided Sclerotherapy with Aleksander Argueta MD, [facility] VE[facility] PROCEDURE ROOM 2   [facility] ([facility])    [address] [facility] [address]   [phone]              Who to contact     If you have questions or need follow up information about today's clinic visit or your schedule please contact [facility] directly at [phone].  Normal or non-critical lab and imaging results will be communicated to you by MyChart, letter or phone within 4 business days after the clinic has received the results. If you do not hear from us within 7 days, please contact the clinic through MyChart or phone. If you have a critical or abnormal lab result, we will notify you by phone as soon as possible.  Submit refill requests through [employer] or call your pharmacy and they will forward the refill request to us. Please allow 3 business days for your refill to be completed.          Additional Information About Your Visit         [employer] Information     [employer] gives you secure access to your electronic health record. If you see a primary care provider, you can also send messages to your care team and make appointments. If you have questions, please call your primary care clinic.  If you do not have a primary care provider, please call [phone] and they will assist you.        Care EveryWhere ID     This is your Care EveryWhere ID. This could be used by other organizations to access your [place] medical records  [id number]        Your Vitals Were     Last Period                   05/01/2009            Blood Pressure from Last 3 Encounters:   05/18/18 96/63   04/06/18 99/64   12/13/17 110/68    Weight from Last 3 Encounters:   12/13/17 84.4 kg (186 lb)   09/05/17 92.4 kg (203 lb 12.8 oz)   04/05/17 93 kg (205 lb)              Today, you had the following     No orders found for display       Primary Care Provider Office Phone # Fax #    Kristal Peña [phone]       [address] [place] [address]        Equal Access to Services     [facility]: Hadii moriah garcia Sokalyani, waaxda lupacheco, qaybta kaalpola luque, josephine al . So [facility] [phone].    ATENCIÓN: Si habla español, tiene a serrato disposición servicios gratLincoln County Medical Centeros de asistencia lingüística. [facility] [phone].    We comply with applicable federal civil rights laws and [place] laws. We do not discriminate on the basis of race, color, national origin, age, disability, sex, sexual orientation, or gender identity.            Thank you!     Thank you for choosing [facility]  for your care. Our goal is always to provide you with excellent care. Hearing back from our patients is one way we can continue to improve our services. Please take a few minutes to complete the written survey that you may receive in the mail after your visit with us. Thank you!             Your Updated  Medication List - Protect others around you: Learn how to safely use, store and throw away your medicines at www.disposemymeds.org.          This list is accurate as of 11/12/18 11:59 PM.  Always use your most recent med list.                   Brand Name Dispense Instructions for use Diagnosis    azithromycin 250 MG tablet    ZITHROMAX    6 tablet    Two tablets first day, then one tablet daily for four days.    Acute non-recurrent maxillary sinusitis       doxycycline monohydrate 100 MG capsule    MONODOX    60 capsule    Take one pill in the morning and take one pill in the evening.    Perioral dermatitis       metroNIDAZOLE 0.75 % external lotion    METROLOTION    59 mL    Apply 1 Film topically 2 times daily    Rosacea

## 2018-11-27 ENCOUNTER — APPOINTMENT (OUTPATIENT)
Dept: VASCULAR SURGERY | Facility: CLINIC | Age: 58
End: 2018-11-27
Payer: COMMERCIAL

## 2018-11-27 ENCOUNTER — OFFICE VISIT (OUTPATIENT)
Dept: VASCULAR SURGERY | Facility: CLINIC | Age: 58
End: 2018-11-27
Payer: COMMERCIAL

## 2018-11-27 DIAGNOSIS — Z53.9 ERRONEOUS ENCOUNTER--DISREGARD: Primary | ICD-10-CM

## 2018-11-27 PROCEDURE — 36475 ENDOVENOUS RF 1ST VEIN: CPT | Mod: RT | Performed by: SURGERY

## 2018-11-27 NOTE — PROGRESS NOTES
[facility]  [facility] Operative Report    Preoperative diagnosis:    1.  CEAP 4 chronic venous insufficiency right lower extremity  2.  Recanalized, symptomatic, incompetent right great saphenous vein    Post operative diagnosis:  Same    Procedure:  1.  Redo radiofrequency ablation right great saphenous vein     Preoperative medications: 3 mg ativan, 0.1 mg clonidine    Surgeon  Aleksander Argueta MD    First assistant  Geno Sun CST/CSFA    Operative description  Indications: Recurrent right lower extremity pain, swelling and lipodermatosclerosis    Procedure: Details of the procedure including risks of bleeding, infection, nerve injury, scarring, hyperpigmentation, deep vein thrombosis, recanalization of the saphenous vein and recurrent varicose veins have been discussed.  The patient and her  appeared to understand and wish to proceed.  Informed consent was obtained.  I initialed her right leg with an indelible marker, then took the patient to the operating room and had her lie supine on our operating table.    VNUS: Right lower extremity and right groin were prepped and draped in a sterile fashion.  A timeout was taken to confirm the appropriate operative site and procedure: Radiofrequency ablation of the right great saphenous vein.    I interrogated the right leg with ultrasound and noted scarring in the right great saphenous vein beginning approximately 8 cm from the saphenofemoral junction to 15 or 16 cm from the junction, then in the distal third of the thigh area below the knee the great saphenous vein broke into multiple varicosities.  I chose to access the great saphenous vein below the knee just cephalad to where it broke into multiple varicosities.  I infiltrated the skin overlying the vein at this level with 1% lidocaine with bicarbonate, placed a micropuncture needle in the vein followed by guidewire and a 7 Tanzanian sheath.  We then passed our closure fast device  and with difficulty manipulated the device through the synechiae in the distal thigh great saphenous vein.  When we entered the proximal third of the vein, the scarring became a bit more significant and despite multiple manipulations, I could not negotiate the tip of the device through the scarring.  We attempted to pass a 025 guidewire unsuccessfully, therefore I re-access the vein just cephalad of the area of scarring, placing a micropuncture guidewire.    Tumescent anesthetic was injected along the course of the great saphenous vein up to the proximal third of the thigh, the block allowed to take effect and then the right great saphenous vein treated with 2 RF cycles to every 7 cm increment down to just below the knee.  She was comfortable throughout.  I reimage the vein and found to be closed.    We removed the sheath and catheter again hemostasis pressure.  I then attempted to pass the 7 Swedish sheath over the guidewire into the proximal third of the great saphenous vein but, due to scarring from her previous surgery, I could not portion of the introducer and dilator through the scarred subcutaneous tissues to enter the vein.  The tip of the dilator was inside the vein but despite multiple manipulations and a lot of pressure, I could not pass the sheath into the great saphenous vein, therefore I aborted the attempt to close the proximal 7-8 cm of the vein.    We gained hemostasis with pressure.  The leg was cleaned with saline solution and then a small gauze and Tegaderm dressings applied to the vein access sites.  We then placed a thigh-high compression stocking, observe the patient for 30 minutes and then covered postoperative instructions with the patient and her .  We then took the patient to her car in a wheelchair.  She tolerated procedure well without evidence of complications.    Paige Prado CMA monitored blood pressure, pulse and pulse oximetry continuously throughout the procedure.    EZIO  Procedure:    Pharmacologic agents:   Local anesthesia:   10 mL Lidocaine 1% with Epinephrine + 1 mL Sodium Bicarbonate 8.4%    Total injected volume: 6 mL    Tumescent Anesthesia: 500 ml bag 0.9% Sodium Chloride +60 ml 1% lidocaine with epi 1:100,000 +12 ml 8.4% Sodium Bicarbonate (total volume: 572ml per bag)  Total injected volume: 172 mL    Use of ultrasound guidance:  Yes  Start time: 10:40 AM  End time: 11:50 AM  Catheter insertion site: 8 cm below the knee  Length of vein treated: 41.5 cm  Treatment with 12 RF cycles for 4 minutes and 0 seconds      Aleksander Argueta MD

## 2018-11-27 NOTE — MR AVS SNAPSHOT
After Visit Summary   11/27/2018    Alfonzo Cho    MRN: [id number]           Patient Information     Date Of Birth          1960        Visit Information        Provider Department      11/27/2018 10:30 AM Aleksander Argueta MD [facility]      Today's Diagnoses     ERRONEOUS ENCOUNTER--DISREGARD    -  1       Follow-ups after your visit        Your next 10 appointments already scheduled     Nov 30, 2018 11:30 AM CST   VNUS Ultrasound 72 Hour with  VEIN VASCULAR LAB   [facility]    [address] [facility] [address]   [phone]            Nov 30, 2018 12:00 PM CST   Rewrap 48 Hour with Aleksander Argueta MD   [facility]    [address] [facility] [address]   [phone]            Nov 30, 2018 12:15 PM CST   Ultrasound Guided Sclerotherapy with Aleksander Argueta MD, [facility] VEIN PROCEDURE ROOM 2   [facility] ( [facility])    [address] [facility] [address]   [phone]              Who to contact     If you have questions or need follow up information about today's clinic visit or your schedule please contact [facility] directly at [phone].  Normal or non-critical lab and imaging results will be communicated to you by MyChart, letter or phone within 4 business days after the clinic has received the results. If you do not hear from us within 7 days, please contact the clinic through MyChart or phone. If you have a critical or abnormal lab result, we will notify you by phone as soon as possible.  Submit refill requests through [employer] or call your pharmacy and they will forward the refill request to us. Please allow 3 business days for your refill to be completed.          Additional Information About Your Visit         [employer] Information     [employer] gives you secure access to your electronic health record. If you see a primary care provider, you can also send messages to your care team and make appointments. If you have questions, please call your primary care clinic.  If you do not have a primary care provider, please call [phone] and they will assist you.        Care EveryWhere ID     This is your Care EveryWhere ID. This could be used by other organizations to access your [place] medical records  [id number]        Your Vitals Were     Last Period                   05/01/2009            Blood Pressure from Last 3 Encounters:   05/18/18 96/63   04/06/18 99/64   12/13/17 110/68    Weight from Last 3 Encounters:   12/13/17 84.4 kg (186 lb)   09/05/17 92.4 kg (203 lb 12.8 oz)   04/05/17 93 kg (205 lb)              Today, you had the following     No orders found for display       Primary Care Provider Office Phone # Fax #    Kristal Peña [phone]       [address] [place] [address]        Equal Access to Services     [facility]: Hadii moriah garcia Sokalyani, waaxda lupacheco, qaybta kaalpola luque, josephine al . So [facility] [phone].    ATENCIÓN: Si habla español, tiene a serrato disposición servicios gratPresbyterian Hospitalos de asistencia lingüística. [facility] [phone].    We comply with applicable federal civil rights laws and [place] laws. We do not discriminate on the basis of race, color, national origin, age, disability, sex, sexual orientation, or gender identity.            Thank you!     Thank you for choosing [facility]  for your care. Our goal is always to provide you with excellent care. Hearing back from our patients is one way we can continue to improve our services. Please take a few minutes to complete the written survey that you may receive in the mail after your visit with us. Thank you!             Your Updated  Medication List - Protect others around you: Learn how to safely use, store and throw away your medicines at www.disposemymeds.org.          This list is accurate as of 11/27/18 11:59 PM.  Always use your most recent med list.                   Brand Name Dispense Instructions for use Diagnosis    azithromycin 250 MG tablet    ZITHROMAX    6 tablet    Two tablets first day, then one tablet daily for four days.    Acute non-recurrent maxillary sinusitis       doxycycline monohydrate 100 MG capsule    MONODOX    60 capsule    Take one pill in the morning and take one pill in the evening.    Perioral dermatitis       metroNIDAZOLE 0.75 % external lotion    METROLOTION    59 mL    Apply 1 Film topically 2 times daily    Rosacea

## 2018-11-30 ENCOUNTER — OFFICE VISIT (OUTPATIENT)
Dept: VASCULAR SURGERY | Facility: CLINIC | Age: 58
End: 2018-11-30
Payer: COMMERCIAL

## 2018-11-30 ENCOUNTER — APPOINTMENT (OUTPATIENT)
Dept: VASCULAR SURGERY | Facility: CLINIC | Age: 58
End: 2018-11-30
Payer: COMMERCIAL

## 2018-11-30 DIAGNOSIS — Z53.9 ERRONEOUS ENCOUNTER--DISREGARD: Primary | ICD-10-CM

## 2018-11-30 PROCEDURE — 99207 ZZC VEINSOLUTIONS 48 HOUR: CPT | Performed by: SURGERY

## 2018-11-30 PROCEDURE — 76942 ECHO GUIDE FOR BIOPSY: CPT | Performed by: SURGERY

## 2018-11-30 PROCEDURE — 36471 NJX SCLRSNT MLT INCMPTNT VN: CPT | Performed by: SURGERY

## 2018-11-30 PROCEDURE — 93971 EXTREMITY STUDY: CPT | Performed by: SURGERY

## 2018-11-30 NOTE — PROGRESS NOTES
[facility] Procedure Report    Preprocedure diagnosis  1.  CEAP 4 chronic venous insufficiency right lower extremity; status post radiofrequency ablation (redo) right great saphenous vein  2.  Persistent, incompetent right great saphenous vein tributaries    Post procedure diagnosis  Same    Procedure  Ultrasound-guided sclerotherapy multiple right great saphenous tributaries (5 veins injected)    Surgeon  ARLETH Argueta MD    Procedure description  Details of procedure including risks of allergic reaction, deep vein thrombosis, hyperpigmentation, retained thrombus and the need for multiple sessions had been discussed.  The patient appeared to understand and wish to proceed.  Informed consent was obtained.    Had the patient lie supine on our examining table with the table in slight reverse Trendelenburg position.  We took a timeout to confirm the appropriate operative site and procedure: Ultrasound-guided sclerotherapy of saphenous tributaries right lower extremity.    I interrogated the right leg with ultrasound just above the knee and noted a large tributary coursing from the great saphenous vein and then down the right medial leg.  I accessed the vein just above the knee with a 27-gauge needle and injected 1 mL of 1% polidocanol foam and a one part polidocanol to 2.5 parts air mixture.  I then marched down the leg and injected a tributary coursing anteriorly just below the knee filling it with 1 mL of foam.  I then injected about 3 cm distally into a conglomeration of great saphenous tributaries, injecting the remaining 1 mL of foam in the syringe.  I then moved down the leg to the mid leg level, and injected 1.5 mL of foam and then finally, I moved down to the distal third of the leg and injected the last 1.5 mL of 1% polidocanol foam.    I elevated the patient's leg and had her perform ankle pumps.  We then cleaned the leg and had her Elen a thigh-high compression stocking.  We had her walk for 10 minutes  prior to getting her car to drive home and instructed to sleep in her stocking tonight and then to wear the stocking for 1 week following the treatment.  I encouraged her to remain active over the next several days.    She will return in approximately 6 weeks in follow-up.    ARLETH Argueta MD

## 2019-01-08 ENCOUNTER — OFFICE VISIT (OUTPATIENT)
Dept: VASCULAR SURGERY | Facility: CLINIC | Age: 59
End: 2019-01-08
Payer: COMMERCIAL

## 2019-01-08 ENCOUNTER — APPOINTMENT (OUTPATIENT)
Dept: VASCULAR SURGERY | Facility: CLINIC | Age: 59
End: 2019-01-08
Payer: COMMERCIAL

## 2019-01-08 DIAGNOSIS — Z53.9 ERRONEOUS ENCOUNTER--DISREGARD: Primary | ICD-10-CM

## 2019-01-08 PROCEDURE — S9999 SALES TAX: HCPCS | Performed by: SURGERY

## 2019-01-08 PROCEDURE — 99207 ZZC VEINSOLUTIONS POST OPERATIVE VISIT: CPT | Performed by: SURGERY

## 2019-01-08 PROCEDURE — 36468 NJX SCLRSNT SPIDER VEINS: CPT | Performed by: SURGERY

## 2019-01-08 NOTE — PROGRESS NOTES
[facility] Procedure Report    Preprocedure diagnosis  CEAP 4 chronic venous insufficiency right lower extremity with residual right great saphenous and non-saphenous tributaries; status post radiofrequency ablation right great saphenous vein and ultrasound-guided sclerotherapy    Post procedure diagnosis  Same    Procedure  Ultrasound-guided sclerotherapy residual, incompetent saphenous and non-saphenous tributaries right lower extremity    Surgeon  ARLETH Argueta MD    Procedure description  Details of procedure including risks of allergic reaction, deep antibiosis, ulceration, hyperpigmentation and superficial phlebitis have been discussed.  The patient appeared to understand and wished to proceed.  Informed consent was obtained.    We had injected saphenous and non-saphenous tributaries of her right lower extremity on 11/30/2018.  She returned today in follow-up.  She states she became tender in the right distal medial thigh and proximal medial right leg following the last session of sclerotherapy but this has improved significantly.    Physical exam  General: Pleasant female in no acute distress.  Examination of her right lower extremity reveals minimal ecchymosis on the right proximal medial thigh in the area of treated great saphenous tributaries.  Along the course of some of the treated, thrombosed veins and obvious lipodermatosclerosis extending down the right medial leg.    Near the mid calf level and extending distally are obviously patent, compressible veins in the middle of areas of lipodermatosclerosis.  Treated.    With the procedure room table in slight reverse Trendelenburg position, I interrogated these veins on the medial right leg with ultrasound, passed a 27-gauge needle into the and injected 1% polidocanol foam and a one part polidocanol to 2.5 parts air mixture.  I then imaged a bit more cephalad and slightly more anterior and attempted to inject a vein at this level but had difficulty  imaging the vein as foam from the previous injection moved cephalad into this vein obscuring the view.    I moved distally approximately 8 cm cephalad of the ankle, noting several large, compressible tributaries within the field of firm lipodermatosclerosis.  I injected the 1.5 mL of foam into these veins filling them nicely.  I then moved about 4-5 cm cephalad and injected about 0.5 mL of foam into the veins at this level.  This filled the remaining, compressible veins.    Had the patient perform ankle pumps as we cleaned the leg with saline solution and had her apply thigh-high compression stocking.  We had her walk for 10 minutes prior to getting her car to drive home and we will have her return in 6 weeks in follow-up.  I will check the status of these veins on the medial right leg.    I am happy to say that the tributaries we had injected on the right medial thigh at her previous session were noncompressible as were large tributaries on the proximal anteromedial right calf.    She tolerated procedure well without evidence of complications or allergic reaction.  We used a total of 3.5 mL of 1% polidocanol foam.    ARLETH Argueta MD

## 2019-02-01 ENCOUNTER — TRANSFERRED RECORDS (OUTPATIENT)
Dept: HEALTH INFORMATION MANAGEMENT | Facility: CLINIC | Age: 59
End: 2019-02-01

## 2019-02-08 ENCOUNTER — TRANSFERRED RECORDS (OUTPATIENT)
Dept: HEALTH INFORMATION MANAGEMENT | Facility: CLINIC | Age: 59
End: 2019-02-08

## 2019-02-26 ENCOUNTER — OFFICE VISIT (OUTPATIENT)
Dept: VASCULAR SURGERY | Facility: CLINIC | Age: 59
End: 2019-02-26

## 2019-02-26 DIAGNOSIS — Z53.9 ERRONEOUS ENCOUNTER--DISREGARD: Primary | ICD-10-CM

## 2019-02-26 PROCEDURE — S9999 SALES TAX: HCPCS | Performed by: SURGERY

## 2019-02-26 PROCEDURE — 36468 NJX SCLRSNT SPIDER VEINS: CPT | Performed by: SURGERY

## 2019-02-28 NOTE — PROGRESS NOTES
[facility] Procedure Note    Preprocedure Diagnosis  1.  CEAP 4 chronic venous insufficiency right lower extremity  2.  Residual, incompetent right non-saphenous vein tributaries; status post redo radiofrequency ablation right great saphenous vein  3.  Superficial thrombophlebitis right lower extremity saphenous and non-saphenous tributaries   Procedure Diagnosis  Same    Procedure  Ultrasound-guided sclerotherapy residual, symptomatic right lower extremity saphenous and non-saphenous tributaries (2 veins, 3 ml 1% Polidocanol foam)      Surgeon  Aleksander Argueta MD    Procedure Description  Details the procedure including risks of allergic reaction, deep vein thrombosis, ulceration, hyperpigmentation, superficial thrombophlebitis were all discussed with the patient.  Patient voiced understanding, questions were answered and informed consent obtained.    The patient had had previous ultrasound-guided sclerotherapy for recurrent superficial thrombophlebitis of her right lower extremity saphenous and non-saphenous tributaries.  We had treated her right great saphenous vein with radiofrequency ablation on 11/27/2018.      I interrogated her right leg with ultrasound and identified residual, compressible varicosities on the distal anteromedial right leg and mid medial right leg.  The remaining large tributaries were all noncompressible and thrombosed.      Under ultrasound guidance, the right saphenous and non-saphenous lower extremity symptomatic, incompetent tributaries were localized and a 27-gauge needle directed into them.  1% polidocanol foam in a one part polidocanol to 2.5 parts air mixture was injected into the 2 previously mentioned veins under ultrasound guidance.  The veins were noted to be in tight spasm and filled with solution.  No solution was noted in the deep veins.  I had the patient perform ankle pumps on the table.  After completing the sclerotherapy, the leg was cleaned with saline  solution and compression hose applied.  We had the patient walk for 10 minutes prior to leaving the clinic. She tolerated the procedure well without evidence of allergic reaction or other complications.  Post procedure instructions and restrictions were given.    The patient will return for a one year post venous closure ultrasound.  She will contact us sooner if she has other concerns or questions    Aleksander Argueta MD

## 2019-04-22 ENCOUNTER — HOSPITAL ENCOUNTER (OUTPATIENT)
Dept: MAMMOGRAPHY | Facility: CLINIC | Age: 59
Discharge: HOME | End: 2019-04-22
Attending: FAMILY MEDICINE | Admitting: FAMILY MEDICINE
Payer: COMMERCIAL

## 2019-04-22 ENCOUNTER — TRANSFERRED RECORDS (OUTPATIENT)
Dept: HEALTH INFORMATION MANAGEMENT | Facility: CLINIC | Age: 59
End: 2019-04-22

## 2019-04-22 DIAGNOSIS — Z12.31 VISIT FOR SCREENING MAMMOGRAM: ICD-10-CM

## 2019-04-22 PROCEDURE — 77067 SCR MAMMO BI INCL CAD: CPT

## 2019-10-22 ENCOUNTER — OFFICE VISIT (OUTPATIENT)
Dept: FAMILY MEDICINE | Facility: CLINIC | Age: 59
End: 2019-10-22
Payer: COMMERCIAL

## 2019-10-22 VITALS
WEIGHT: 167 LBS | HEART RATE: 84 BPM | DIASTOLIC BLOOD PRESSURE: 74 MMHG | BODY MASS INDEX: 23.38 KG/M2 | OXYGEN SATURATION: 99 % | TEMPERATURE: 97.5 F | SYSTOLIC BLOOD PRESSURE: 118 MMHG | HEIGHT: 71 IN

## 2019-10-22 DIAGNOSIS — Z13.220 LIPID SCREENING: ICD-10-CM

## 2019-10-22 DIAGNOSIS — N95.1 VAGINAL DRYNESS, MENOPAUSAL: ICD-10-CM

## 2019-10-22 DIAGNOSIS — Z12.4 SCREENING FOR CERVICAL CANCER: ICD-10-CM

## 2019-10-22 DIAGNOSIS — Z11.4 ENCOUNTER FOR SCREENING FOR HIV: ICD-10-CM

## 2019-10-22 DIAGNOSIS — Z12.11 SCREEN FOR COLON CANCER: ICD-10-CM

## 2019-10-22 DIAGNOSIS — Z13.29 SCREENING FOR THYROID DISORDER: ICD-10-CM

## 2019-10-22 DIAGNOSIS — G47.30 SLEEP APNEA, UNSPECIFIED TYPE: ICD-10-CM

## 2019-10-22 DIAGNOSIS — Z00.00 ROUTINE GENERAL MEDICAL EXAMINATION AT A HEALTH CARE FACILITY: Primary | ICD-10-CM

## 2019-10-22 LAB
ERYTHROCYTE [DISTWIDTH] IN BLOOD BY AUTOMATED COUNT: 12.6 % (ref 10–15)
HCT VFR BLD AUTO: 41.3 % (ref 35–47)
HGB BLD-MCNC: 13.6 G/DL (ref 11.7–15.7)
MCH RBC QN AUTO: 31.4 PG (ref 26.5–33)
MCHC RBC AUTO-ENTMCNC: 32.9 G/DL (ref 31.5–36.5)
MCV RBC AUTO: 95 FL (ref 78–100)
PLATELET # BLD AUTO: 196 10E9/L (ref 150–450)
RBC # BLD AUTO: 4.33 10E12/L (ref 3.8–5.2)
WBC # BLD AUTO: 3.5 10E9/L (ref 4–11)

## 2019-10-22 PROCEDURE — 85027 COMPLETE CBC AUTOMATED: CPT | Performed by: NURSE PRACTITIONER

## 2019-10-22 PROCEDURE — 87389 HIV-1 AG W/HIV-1&-2 AB AG IA: CPT | Performed by: NURSE PRACTITIONER

## 2019-10-22 PROCEDURE — 87624 HPV HI-RISK TYP POOLED RSLT: CPT | Performed by: NURSE PRACTITIONER

## 2019-10-22 PROCEDURE — 36415 COLL VENOUS BLD VENIPUNCTURE: CPT | Performed by: NURSE PRACTITIONER

## 2019-10-22 PROCEDURE — 99396 PREV VISIT EST AGE 40-64: CPT | Performed by: NURSE PRACTITIONER

## 2019-10-22 PROCEDURE — 80061 LIPID PANEL: CPT | Performed by: NURSE PRACTITIONER

## 2019-10-22 PROCEDURE — G0145 SCR C/V CYTO,THINLAYER,RESCR: HCPCS | Performed by: NURSE PRACTITIONER

## 2019-10-22 PROCEDURE — 84443 ASSAY THYROID STIM HORMONE: CPT | Performed by: NURSE PRACTITIONER

## 2019-10-22 PROCEDURE — 99213 OFFICE O/P EST LOW 20 MIN: CPT | Mod: 25 | Performed by: NURSE PRACTITIONER

## 2019-10-22 PROCEDURE — 80053 COMPREHEN METABOLIC PANEL: CPT | Performed by: NURSE PRACTITIONER

## 2019-10-22 ASSESSMENT — MIFFLIN-ST. JEOR: SCORE: 1418.64

## 2019-10-22 NOTE — PROGRESS NOTES
SUBJECTIVE:   CC: Alfonzo Cho is an 59 year old woman who presents for preventive health visit.     Healthy Habits:    Do you get at least three servings of calcium containing foods daily (dairy, green leafy vegetables, etc.)? yes    Amount of exercise or daily activities, outside of work: 3 day(s) per week    Problems taking medications regularly not applicable    Medication side effects: No    Have you had an eye exam in the past two years? yes    Do you see a dentist twice per year? yes    Do you have sleep apnea, excessive snoring or daytime drowsiness? has CPAP machine  but last week or so can't breath properly with it on, too forceful will refer to sleep medicine    Patient reports new concerns of what she presumes is arthritis in the hands and upper back which has been achy and constant. She has not tried anything to relieve the pain. She does have arthritis in her knee for which she needs a knee replacement.    Second patient concern is vaginal dryness that makes intercourse painful. Has tried estrogen cream but with mothers history of uterine cancer and recent abnormal pap she does not want to utilize that anymore. Has tried KY jelly and Refresh suppository without success. No vaginal bleeding and abnormal discharge present.      Today's PHQ-2 Score:   PHQ-2 (  [employer]) 10/22/2019 2017   Q1: Little interest or pleasure in doing things 0 1   Q2: Feeling down, depressed or hopeless 0 0   PHQ-2 Score 0 1   Q1: Little interest or pleasure in doing things - Several days   Q2: Feeling down, depressed or hopeless - Not at all   PHQ-2 Score - 1       Abuse: Current or Past(Physical, Sexual or Emotional)- NO  Do you feel safe in your environment? YES    Social History     Tobacco Use     Smoking status: Former Smoker     Years: 2.00     Types: Cigarettes     Start date: 1978     Last attempt to quit: 1980     Years since quittin.8     Smokeless tobacco: Never Used     Tobacco comment: only  smoked a couple of years   Substance Use Topics     Alcohol use: Yes     Alcohol/week: 0.0 standard drinks     Comment: 0-2 drinks per week     If you drink alcohol do you typically have >3 drinks per day or >7 drinks per week? No                     Reviewed orders with patient.  Reviewed health maintenance and updated orders accordingly - Yes  BP Readings from Last 3 Encounters:   10/22/19 118/74   18 96/63   18 99/64    Wt Readings from Last 3 Encounters:   10/22/19 75.8 kg (167 lb)   17 84.4 kg (186 lb)   17 92.4 kg (203 lb 12.8 oz)                  Patient Active Problem List   Diagnosis     Pain in joint, pelvic region and thigh     Seasonal allergic rhinitis     Symptomatic menopausal or female climacteric states     Sleep apnea     history of Thrombophlebitis leg (H)     Plantar fasciitis     Seborrheic dermatitis     Infrapatellar bursitis     Low blood pressure- usual - asymptomatic- 90/60 = baseline     Family history of hypothyroidism- father     Memory difficulties- some short-term, worse at work     Advanced directives, counseling/discussion     Aortic calcification (H)     Nonrheumatic aortic valve insufficiency - mild - no symptoms.      Papanicolaou smear of cervix with atypical squamous cells of undetermined significance (ASC-US)     BMI 28.0-28.9,adult     Asymptomatic varicose veins     Gastroesophageal reflux disease, esophagitis presence not specified     Past Surgical History:   Procedure Laterality Date      SECTION      S/P CSECT X 3     ENDOSCOPIC STRIPPING VEIN(S)  92,02    S/P VEIN STRIPPING      HC COLONOSCOPY THRU STOMA, DIAGNOSTIC  2009     [place] [facility] - normal - repeat in 2019      TUBAL LIGATION      with 3rd        Social History     Tobacco Use     Smoking status: Former Smoker     Years: 2.00     Types: Cigarettes     Start date: 1978     Last attempt to quit: 1980     Years since quittin.8     Smokeless tobacco: Never Used      Tobacco comment: only smoked a couple of years   Substance Use Topics     Alcohol use: Yes     Alcohol/week: 0.0 standard drinks     Comment: 0-2 drinks per week     Family History   Problem Relation Age of Onset     Neurologic Disorder Father         ALS     Hypothyroidism Father      Glaucoma Father         glaucoma     Osteoporosis Mother         on Fosamax      Uterine Cancer Mother 78        endometrial cancer s/p hysterectomy at age 80 - still alive      Esophageal Cancer Maternal Uncle      Esophageal Cancer Maternal Uncle      Hearing Loss Brother      No Known Problems Son      No Known Problems Daughter      No Known Problems Son      Hearing Loss Brother      Hearing Loss Brother      C.A.D. Maternal Grandfather          age 62     Diabetes Paternal Uncle      Cerebrovascular Disease Maternal Grandmother          age 70      No Known Problems Paternal Grandmother      No Known Problems Paternal Grandfather          No current outpatient medications on file.     Mammogram Screening: Patient over age 50, mutual decision to screen reflected in health maintenance. Last done  and came back normal    Colonoscopy: Last done in  which came back normal; order placed for one this year    Pap: HX of ASCUS in 2016, repeated Pap today    Influenza: Given today    Shingles: Told patient to talk to insurance about coverage    Pertinent mammograms are reviewed under the imaging tab.  History of abnormal Pap smear:   Last 3 Pap and HPV Results:   PAP / HPV Latest Ref Rng & Units 2016   PAP - ASC-US(A) NIL NIL   HPV 16 DNA NEG Negative - -   HPV 18 DNA NEG Negative - -   OTHER HR HPV NEG Negative - -     PAP / HPV Latest Ref Rng & Units 2016   PAP - ASC-US(A) NIL NIL   HPV 16 DNA NEG Negative - -   HPV 18 DNA NEG Negative - -   OTHER HR HPV NEG Negative - -     Reviewed and updated as needed this visit by clinical staff  Tobacco  Allergies  Meds  Fam Hx          Reviewed and updated as needed this visit by Provider  Orestes Quiroz        Past Medical History:   Diagnosis Date     Aortic calcification (H) 2016     Asymptomatic varicose veins      Contact dermatitis and other eczema, due to unspecified cause     seasonally     Nonrheumatic aortic valve insufficiency - mild - no symptoms.     incidental finding on thoracic XR     Papanicolaou smear of cervix with atypical squamous cells of undetermined significance (ASC-US) 16    Neg HR HPV     Phlebitis and thrombophlebitis of femoral vein (deep) (superficial) (H) not candidate for HRT     popliteal after progesterone oral therapy - no other clots      Sleep apnea     now on CPAP       Past Surgical History:   Procedure Laterality Date      SECTION      S/P CSECT X 3     ENDOSCOPIC STRIPPING VEIN(S)  92,02    S/P VEIN STRIPPING      HC COLONOSCOPY THRU STOMA, DIAGNOSTIC  2009     [place] [facility] - normal - repeat in 2019      TUBAL LIGATION  1995    with 3rd      OB History    Para Term  AB Living   4 3 3 0 1 3   SAB TAB Ectopic Multiple Live Births   1 0 0 0 0      # Outcome Date GA Lbr [place]/2nd Weight Sex Delivery Anes PTL Lv   4 SAB            3 Term            2 Term            1 Term               Obstetric Comments   S/p CS x 3 - first one for footling breech        ROS:  CONSTITUTIONAL: NEGATIVE for fever or chills, Positive: Weight loss with medifast diet plan  INTEGUMENTARY/SKIN: NEGATIVE for worrisome rashes, moles or lesions  EYES: NEGATIVE for vision changes or irritation  ENT:  uses hearing aides bilaterally, no nose or throat concerns  RESP: NEGATIVE for significant cough or SOB  BREAST: NEGATIVE for masses, tenderness or discharge  CV: NEGATIVE for chest pain, palpitations or peripheral edema  GI: NEGATIVE for nausea, abdominal pain, or heartburn; Positive for mild intermittent constipation  : NEGATIVE for unusual urinary symptoms. No vaginal bleeding. Increased vaginal  "dryness and feels bump on right labia  MUSCULOSKELETAL: Positive for arthralgias in hands and upper back, negative for myalgias  NEURO: NEGATIVE for weakness, dizziness or paresthesias  PSYCHIATRIC: NEGATIVE for changes in mood or affect     OBJECTIVE:   /74   Pulse 84   Temp 97.5  F (36.4  C) (Oral)   Ht 1.803 m (5' 11\")   Wt 75.8 kg (167 lb)   LMP 05/01/2009   SpO2 99%   BMI 23.29 kg/m    EXAM:  GENERAL APPEARANCE: healthy, alert and no acute distress  EYES: Eyes grossly normal to inspection, PERRL and conjunctivae and sclerae normal  HENT: Positive: fluid in left TM without erythema, right TM negative for erythema or fluid, bony landmarks visible, nose and mouth without ulcers or lesions, oropharynx clear and oral mucous membranes moist  NECK: no adenopathy, no asymmetry, masses, or scars and thyroid normal to palpation  RESP: lungs clear to auscultation - no rales, rhonchi or wheezes  BREAST: normal without masses, tenderness or nipple discharge and no palpable axillary masses or adenopathy  CV: regular rate and rhythm, normal S1 S2, no S3 or S4, no murmur, click or rub  ABDOMEN: soft, nontender, no hepatosplenomegaly, no masses and bowel sounds active   (female):  female external genitalia free of lesions or discharge, thinning skin on labias with one white pustule on right labia about 1/2 cm in diameter, normal urethral meatus, vaginal mucosal atrophy noted, cervix without erythema, lesions or discharge, adnexae, and uterus without masses or abnormal discharge  MS: no musculoskeletal defects are noted and gait is age appropriate without ataxia  SKIN: no suspicious lesions or rashes  NEURO: Normal strength and tone, sensory exam grossly normal, mentation intact and speech normal  PSYCH: mentation appears normal and affect normal/bright    Diagnostic Test Results:  Results for orders placed or performed in visit on 10/22/19 (from the past 24 hour(s))   CBC with platelets   Result Value Ref Range "    WBC 3.5 (L) 4.0 - 11.0 10e9/L    RBC Count 4.33 3.8 - 5.2 10e12/L    Hemoglobin 13.6 11.7 - 15.7 g/dL    Hematocrit 41.3 35.0 - 47.0 %    MCV 95 78 - 100 fl    MCH 31.4 26.5 - 33.0 pg    MCHC 32.9 31.5 - 36.5 g/dL    RDW 12.6 10.0 - 15.0 %    Platelet Count 196 150 - 450 10e9/L       ASSESSMENT/PLAN:   Routine general medical examination at a health care facility  Educated on exercises for arthritis pain. Patient declines wanting any prescriptions for arthritis and currently does not want to pursue further workup for it.  WBC came back low at 3.5 without history of low WBC. Differentials would include viral etiology vs cancer. Would like to retest in 1 months time and add on peripheral smear if that comes back low again. Will also await CMP to assess multi organ function in light of low WBC.  - CBC with platelets  - Comprehensive metabolic panel (BMP + Alb, Alk Phos, ALT, AST, Total. Bili, TP)    Vaginal Dryness, menopausal  Concerns of vaginal dryness and arthritis. Discussed use of Replens, wasn't well tolerated previously.  Recommended using coconut oil or astroglide for intercourse.   Declined vaginal estrogen use.      Sleep apnea, unspecified type  Chronic sleep apnea utilizing a CPAP, recent weight loss and increased discomfort with CPAP so referral is placed for re-evaluation of need for CPAP or CPAP setting adjustment.  - SLEEP EVALUATION & MANAGEMENT REFERRAL - ADULT -[facility] - [place]  [phone] (Age 18 and up); Future    Lipid screening  Routine lipid screening without prior abnormalities   - Lipid panel reflex to direct LDL Fasting    4. Screening for thyroid disorder  Family history of thyroid disease, last TSH in 2017 was normal  - TSH with free T4 reflex    5. Encounter for screening for HIV  Routine HIV screening in low risk patient  - HIV Antigen Antibody Combo    6. Screening for cervical cancer  Abnormal screen with positive ASCUS without HPV in 2016, repeated pap this  "visit  - Pap imaged thin layer screen with HPV - recommended age 30 - 65 years (select HPV order below)  - HPV High Risk Types DNA Cervical    7. Screen for colon cancer  Routine colon cancer screening with order placed for colonoscopy. No history of polyps  - GASTROENTEROLOGY ADULT REF PROCEDURE ONLY None    COUNSELING:   Reviewed preventive health counseling, as reflected in patient instructions       Immunizations    Discussed Shingles Vaccine         Colon cancer screening       HIV screeninx in teen years, 1x in adult years, and at intervals if high risk    Estimated body mass index is 23.29 kg/m  as calculated from the following:    Height as of this encounter: 1.803 m (5' 11\").    Weight as of this encounter: 75.8 kg (167 lb).  Currently utilizing Medifast       reports that she quit smoking about 39 years ago. Her smoking use included cigarettes. She started smoking about 41 years ago. She quit after 2.00 years of use. She has never used smokeless tobacco.      Counseling Resources:  ATP IV Guidelines  Pooled Cohorts Equation Calculator  Breast Cancer Risk Calculator  FRAX Risk Assessment  ICSI Preventive Guidelines  Dietary Guidelines for Americans, 2010  USDA's MyPlate  ASA Prophylaxis  Lung CA Screening    Follow up 1 month for repeat CBC      Nahed Yuen RN, Student FNP, [employer]    History and physical examination done with student nurse practitioner.  Student acted as scribe for this encounter.  I agree with assessment and plan for this patient.     LORENZO Dooley [facility] [place]  "

## 2019-10-22 NOTE — PATIENT INSTRUCTIONS

## 2019-10-23 DIAGNOSIS — D72.819 LEUKOPENIA, UNSPECIFIED TYPE: Primary | ICD-10-CM

## 2019-10-23 LAB
ALBUMIN SERPL-MCNC: 4.2 G/DL (ref 3.4–5)
ALP SERPL-CCNC: 69 U/L (ref 40–150)
ALT SERPL W P-5'-P-CCNC: 36 U/L (ref 0–50)
ANION GAP SERPL CALCULATED.3IONS-SCNC: 8 MMOL/L (ref 3–14)
AST SERPL W P-5'-P-CCNC: 21 U/L (ref 0–45)
BILIRUB SERPL-MCNC: 0.6 MG/DL (ref 0.2–1.3)
BUN SERPL-MCNC: 19 MG/DL (ref 7–30)
CALCIUM SERPL-MCNC: 8.8 MG/DL (ref 8.5–10.1)
CHLORIDE SERPL-SCNC: 106 MMOL/L (ref 94–109)
CHOLEST SERPL-MCNC: 184 MG/DL
CO2 SERPL-SCNC: 25 MMOL/L (ref 20–32)
CREAT SERPL-MCNC: 0.71 MG/DL (ref 0.52–1.04)
GFR SERPL CREATININE-BSD FRML MDRD: >90 ML/MIN/{1.73_M2}
GLUCOSE SERPL-MCNC: 86 MG/DL (ref 70–99)
HDLC SERPL-MCNC: 63 MG/DL
HIV 1+2 AB+HIV1 P24 AG SERPL QL IA: NONREACTIVE
LDLC SERPL CALC-MCNC: 108 MG/DL
NONHDLC SERPL-MCNC: 121 MG/DL
POTASSIUM SERPL-SCNC: 3.8 MMOL/L (ref 3.4–5.3)
PROT SERPL-MCNC: 6.9 G/DL (ref 6.8–8.8)
SODIUM SERPL-SCNC: 139 MMOL/L (ref 133–144)
TRIGL SERPL-MCNC: 66 MG/DL
TSH SERPL DL<=0.005 MIU/L-ACNC: 1.92 MU/L (ref 0.4–4)

## 2019-10-23 NOTE — RESULT ENCOUNTER NOTE
Dear Alfonzo,     -Normal red blood cell (hgb) levels, decreased white blood cell count and normal platelet levels. ADVISE: repeating this test in 1-2 months.  I placed a lab order and you can schedule a lab only appointment.    -LDL(bad) cholesterol level is slightly elevated which can increase your heart disease risk.  A diet high in fat and simple carbohydrates, genetics and being overweight can contribute to this. ADVISE: exercising 150 minutes of aerobic exercise per week (30 minutes for 5 days per week or 50 minutes for 3 days per week are options) and eating a low saturated fat/low carbohydrate diet are helpful to improve this. In 12 months, you should recheck your fasting cholesterol panel.  -Liver and gallbladder tests are normal (ALT,AST, Alk phos, bilirubin), kidney function is normal (Cr, GFR), sodium is normal, potassium is normal, calcium is normal, glucose is normal.  -TSH (thyroid stimulating hormone) level is normal which indicates normal thyroid function.  -HIV screening test is normal.      Please send a [employer] message or call [phone]  if you have any questions.      Alba Iraheta, APRN, CNP  [place] [facility] [place]    If you have further questions about the interpretation of your labs, [email or web address] is a good website to check out for further information.

## 2019-10-25 LAB
COPATH REPORT: NORMAL
PAP: NORMAL

## 2019-10-29 LAB
FINAL DIAGNOSIS: NORMAL
HPV HR 12 DNA CVX QL NAA+PROBE: NEGATIVE
HPV16 DNA SPEC QL NAA+PROBE: NEGATIVE
HPV18 DNA SPEC QL NAA+PROBE: NEGATIVE
SPECIMEN DESCRIPTION: NORMAL
SPECIMEN SOURCE CVX/VAG CYTO: NORMAL

## 2019-11-04 ENCOUNTER — HEALTH MAINTENANCE LETTER (OUTPATIENT)
Age: 59
End: 2019-11-04

## 2019-11-27 ENCOUNTER — OFFICE VISIT (OUTPATIENT)
Dept: SLEEP MEDICINE | Facility: CLINIC | Age: 59
End: 2019-11-27
Attending: NURSE PRACTITIONER
Payer: COMMERCIAL

## 2019-11-27 VITALS
WEIGHT: 165.2 LBS | DIASTOLIC BLOOD PRESSURE: 60 MMHG | HEIGHT: 71 IN | OXYGEN SATURATION: 97 % | HEART RATE: 63 BPM | BODY MASS INDEX: 23.13 KG/M2 | SYSTOLIC BLOOD PRESSURE: 98 MMHG

## 2019-11-27 DIAGNOSIS — J30.2 SEASONAL ALLERGIC RHINITIS, UNSPECIFIED TRIGGER: Primary | ICD-10-CM

## 2019-11-27 DIAGNOSIS — G47.33 OBSTRUCTIVE SLEEP APNEA SYNDROME: ICD-10-CM

## 2019-11-27 DIAGNOSIS — Z72.821 INADEQUATE SLEEP HYGIENE: ICD-10-CM

## 2019-11-27 PROCEDURE — 99204 OFFICE O/P NEW MOD 45 MIN: CPT | Performed by: PEDIATRICS

## 2019-11-27 RX ORDER — OMEGA-3 FATTY ACIDS/FISH OIL 300-1000MG
200 CAPSULE ORAL EVERY 4 HOURS PRN
COMMUNITY
End: 2021-01-07

## 2019-11-27 RX ORDER — CHLORAL HYDRATE 500 MG
1 CAPSULE ORAL DAILY
COMMUNITY
End: 2021-01-07

## 2019-11-27 ASSESSMENT — MIFFLIN-ST. JEOR: SCORE: 1410.21

## 2019-11-27 NOTE — PATIENT INSTRUCTIONS
Your blood pressure was checked while you were in clinic today.  Please read the guidelines below about what these numbers mean and what you should do about them.  Your systolic blood pressure is the top number.  This is the pressure when the heart is pumping.  Your diastolic blood pressure is the bottom number.  This is the pressure in between beats.  If your systolic blood pressure is less than 120 and your diastolic blood pressure is less than 80, then your blood pressure is normal. There is nothing more that you need to do about it  If your systolic blood pressure is 120-139 or your diastolic blood pressure is 80-89, your blood pressure may be higher than it should be.  You should have your blood pressure re-checked within a year by a primary care provider.  If your systolic blood pressure is 140 or greater or your diastolic blood pressure is 90 or greater, you may have high blood pressure.  High blood pressure is treatable, but if left untreated over time it can put you at risk for heart attack, stroke, or kidney failure.  You should have your blood pressure re-checked by a primary care provider within the next four weeks.  Your BMI is There is no height or weight on file to calculate BMI.  Weight management is a personal decision.  If you are interested in exploring weight loss strategies, the following discussion covers the approaches that may be successful. Body mass index (BMI) is one way to tell whether you are at a healthy weight, overweight, or obese. It measures your weight in relation to your height.  A BMI of 18.5 to 24.9 is in the healthy range. A person with a BMI of 25 to 29.9 is considered overweight, and someone with a BMI of 30 or greater is considered obese. More than two-thirds of American adults are considered overweight or obese.  Being overweight or obese increases the risk for further weight gain. Excess weight may lead to heart disease and diabetes.  Creating and following plans for  healthy eating and physical activity may help you improve your health.  Weight control is part of healthy lifestyle and includes exercise, emotional health, and healthy eating habits. Careful eating habits lifelong are the mainstay of weight control. Though there are significant health benefits from weight loss, long-term weight loss with diet alone may be very difficult to achieve- studies show long-term success with dietary management in less than 10% of people. Attaining a healthy weight may be especially difficult to achieve in those with severe obesity. In some cases, medications, devices and surgical management might be considered.  What can you do?  If you are overweight or obese and are interested in methods for weight loss, you should discuss this with your provider.     Consider reducing daily calorie intake by 500 calories.     Keep a food journal.     Avoiding skipping meals, consider cutting portions instead.    Diet combined with exercise helps maintain muscle while optimizing fat loss. Strength training is particularly important for building and maintaining muscle mass. Exercise helps reduce stress, increase energy, and improves fitness. Increasing exercise without diet control, however, may not burn enough calories to loose weight.       Start walking three days a week 10-20 minutes at a time    Work towards walking thirty minutes five days a week     Eventually, increase the speed of your walking for 1-2 minutes at time    In addition, we recommend that you review healthy lifestyles and methods for weight loss available through the National Institutes of Health patient information sites:  http://win.niddk.nih.gov/publications/index.htm    And look into health and wellness programs that may be available through your health insurance provider, employer, local community center, or sandi club.

## 2019-11-27 NOTE — PROGRESS NOTES
[facility]  Outpatient Sleep Medicine Consultation      Name: Alfonzo Cho MRN# [id number]   Age: 59 year old YOB: 1960     Date of Consultation: November 27, 2019  Consultation is requested by: LORENZO Delarosa CNP  [address] [place] [address]  Primary care provider: Kristal Peña       Reason for Sleep Consult:     Alfonzo Cho is a 59 year old female with history of obstructive sleep apnea who presents for reevaluation.         Assessment and Plan:     1. Obstructive sleep apnea syndrome  The patient is a faithful CPAP user with no difficulty with sleep initiation using CPAP fixed pressure 7 cm water.  However, nighttime awakenings are disruptive and seem to be related to the problems tolerating CPAP or possibly subtherapeutic treatment.  Today I programmed her machine to deliver AutoPap between 7 and 10 cmH2O to determine if more prominent obstruction during REM sleep resulted in her nighttime awakenings.  The patient is also interested in an attended polysomnogram study to identify therapeutic pressures.  Additionally, her CPAP machine has exceeded its reasonable useful lifetime and is showing signs of malfunction and need for replacement.  We discussed the possibility of an oral mandibular advancement device as an alternative to CPAP therapy for mild obstructive sleep apnea.  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    2. Seasonal allergic rhinitis, unspecified trigger  Allergic rhinitis can increase upper airway resistance and exacerbate MIGUEL.  Inability to breathe through the nose can make PAP therapy more difficult to tolerate.    3. Inadequate sleep hygiene  I recommended that she avoid using electronic screens in bed, since this may be contributing to her nighttime awakenings.  The patient is  "agreeable.    The patient was advised to avoid driving, operating any heavy machinery or engaging in other hazardous situations while drowsy or sleepy.  Patient was counseled on the importance of driving while alert and to pull over if drowsy.           Chief Complaint:   \"Increased difficulty tolerating CPAP (intermittently), weight loss- continued need for CPAP?\"         History of Present Illness:     Alfonzo Cho is a 59 year old female who I am seeing for the first time in my adult sleep medicine clinic.  I reviewed data from an overnight polysomnogram study at a different facility dated July 25, 2008.  The study demonstrated mild obstructive sleep apnea with no REM or position predominance.  The apnea hypopnea index was 7 and the oxyhemoglobin saturation myrtle was 83%.  CPAP therapy at 7 cm water pressure was prescribed and the patient has been a faithful user since that time.  Her weight at the time of the study was 170 pounds, similar to today's weight.    Data collected from the machines compliance card shows nightly usage of the therapy over the past 30 nights with an average usage of 5 hours 8 minutes.  Percent of days used greater than 4 hours is 80%.  The machine reports an estimated AHI of 1.7/h of use time.      The patient reports that she has no difficulty using CPAP at sleep initiation although she frequently awakens between 2 and 4 AM with discomfort and feels that she needs to take the CPAP mask off.  This has been occurring for approximately 2 months.  She returns to sleep for the remainder of the night without CPAP.  Snoring and apneic events have not been reported to her with or without CPAP in place.  There is some environmental sleep disruption with her  snoring or the machine alarming.  She tells me that her machine is approximately 5 years old and has begun to show signs of malfunction.    Typical bedtime is at 10 PM with a sleep latency of less than 5 minutes.  Final wake up time " is between 530 and 6 AM.  She estimates 0 to 2 hours of awakening during the night during which time it is her habit to use her electronic device to play games.  She estimates 5 to 7 hours of sleep on a nightly basis and feels that she needs at least 7-4 to feel rested.  Her score on the Dunlap Sleepiness Scale is 6 out of 24 which is in the normal range.  She denies problems with driving due to sleepiness.  Occasionally she takes more than 3 caffeinated beverages per day.  Nightmares are rare.  She describes no symptoms suggestive of a hypersomnia disorder, parasomnia, or limb movement disorder.    She lives at home with her  and 2 adult sons.  She works as a dental hygienist.  She denies hypertension and diabetes.  She has no upcoming surgeries.  She does not take any prescription medications.  She has a history of allergies and experiences postnasal drip and rhinorrhea which seem related to changes in temperature and environment.  She experiences joint pain and swelling but this does not interfere with her sleep quality.    Review of systems is otherwise negative except as described above.           Medications:     No current outpatient medications on file.     No current facility-administered medications for this visit.         Allergies   Allergen Reactions     Penicillins      IV penicillin- Hives, edema hands and feet            Past Medical History:     Past Medical History:   Diagnosis Date     Aortic calcification (H) 4/22/2016     Asymptomatic varicose veins      Contact dermatitis and other eczema, due to unspecified cause     seasonally     Nonrheumatic aortic valve insufficiency - mild - no symptoms.     incidental finding on thoracic XR     Papanicolaou smear of cervix with atypical squamous cells of undetermined significance (ASC-US) 07/01/16    Neg HR HPV     Phlebitis and thrombophlebitis of femoral vein (deep) (superficial) (H) not candidate for HRT     popliteal after progesterone oral  "therapy - no other clots      Sleep apnea     now on CPAP              Past Surgical History:      Past Surgical History:   Procedure Laterality Date      SECTION      S/P CSECT X 3     ENDOSCOPIC STRIPPING VEIN(S)  92,02    S/P VEIN STRIPPING      HC COLONOSCOPY THRU STOMA, DIAGNOSTIC  2009     [place] [facility] - normal - repeat in 2019      TUBAL LIGATION  1995    with 3rd             Social History:     Social History     Tobacco Use     Smoking status: Former Smoker     Years: 2.00     Types: Cigarettes     Start date: 1978     Last attempt to quit: 1980     Years since quittin.9     Smokeless tobacco: Never Used     Tobacco comment: only smoked a couple of years   Substance Use Topics     Alcohol use: Yes     Alcohol/week: 0.0 standard drinks     Comment: 0-2 drinks per week            Family History:     Family History   Problem Relation Age of Onset     Neurologic Disorder Father         ALS     Hypothyroidism Father      Glaucoma Father         glaucoma     Osteoporosis Mother         on Fosamax      Uterine Cancer Mother 78        endometrial cancer s/p hysterectomy at age 80 - still alive      Esophageal Cancer Maternal Uncle      Esophageal Cancer Maternal Uncle      Hearing Loss Brother      No Known Problems Son      No Known Problems Daughter      No Known Problems Son      Hearing Loss Brother      Hearing Loss Brother      C.A.D. Maternal Grandfather          age 62     Diabetes Paternal Uncle      Cerebrovascular Disease Maternal Grandmother          age 70      No Known Problems Paternal Grandmother      No Known Problems Paternal Grandfather              Review of Systems:     A complete 10 point review of systems was negative other than HPI or as commented below.         Physical Examination:   BP 98/60   Pulse 63   Ht 1.803 m (5' 10.98\")   Wt 74.9 kg (165 lb 3.2 oz)   LMP 2009   SpO2 97%   BMI 23.05 kg/m     Neck Circumference: 13 inches "   Constitutional:  Awake, alert, cooperative, in no apparent distress  Eyes: No icterus.  ENT: Mallampati Class: II, shallow oral pharyngeal opening.  Nose: Mild edema of the inferior nasal turbinates. Tongue: Normal.  Cardiovascular: Regular S1 and S2, no gallops or murmurs  Neck: Supple, no thyroid enlargement  Pulmonary:  Chest symmetric, lungs clear bilaterally and no crackles, wheezes or rales  Extremities:  No pretibial edema  Skin:  No rash or significant lesions visible  Gait:  Normal  Neurologic: Alert, oriented, no focal neurological deficit  Psychological: euthymic; affect appropriate            Data: All pertinent previous laboratory data reviewed     No results found for: PH, PHARTERIAL, PO2, [id number], SAT, PCO2, HCO3, BASEEXCESS, SANDY, BEB  Lab Results   Component Value Date    TSH 1.92 10/22/2019    TSH 1.76 09/05/2017     Lab Results   Component Value Date    GLC 86 10/22/2019    GLC 91 09/05/2017     Lab Results   Component Value Date    HGB 13.6 10/22/2019    HGB 13.7 09/05/2017     Lab Results   Component Value Date    BUN 19 10/22/2019    BUN 11 09/05/2017    CR 0.71 10/22/2019    CR 0.79 09/05/2017     Lab Results   Component Value Date    AST 21 10/22/2019    AST 21 09/05/2017    ALT 36 10/22/2019    ALT 29 09/05/2017    ALKPHOS 69 10/22/2019    ALKPHOS 64 09/05/2017    BILITOTAL 0.6 10/22/2019    BILITOTAL 0.5 09/05/2017     No results found for: UAMP, UBARB, BENZODIAZEUR, UCANN, UCOC, OPIT, UPCP    Mago Silva MD   11/27/2019   [address], [address]  [place] [address] [phone]    Copy to: Kristal Peña

## 2019-11-27 NOTE — NURSING NOTE
"Chief Complaint   Patient presents with     Consult     difficulty using cpap now, wakes during the night, feels cannot exhale, so removes mask.  prev study , using cpap        Initial BP 98/60   Pulse 63   Ht 1.803 m (5' 10.98\")   Wt 74.9 kg (165 lb 3.2 oz)   LMP 05/01/2009   SpO2 97%   BMI 23.05 kg/m   Estimated body mass index is 23.05 kg/m  as calculated from the following:    Height as of this encounter: 1.803 m (5' 10.98\").    Weight as of this encounter: 74.9 kg (165 lb 3.2 oz).    Medication Reconciliation: complete    Neck circumference: 13 inches / 33 centimeters.    DME:yes  [employer] dreamstation, [employer]  DME vendor    ESS 6  "

## 2019-11-29 ENCOUNTER — DOCUMENTATION ONLY (OUTPATIENT)
Dept: SLEEP MEDICINE | Facility: CLINIC | Age: 59
End: 2019-11-29

## 2019-11-29 ENCOUNTER — THERAPY VISIT (OUTPATIENT)
Dept: SLEEP MEDICINE | Facility: CLINIC | Age: 59
End: 2019-11-29
Payer: COMMERCIAL

## 2019-11-29 DIAGNOSIS — G47.33 OBSTRUCTIVE SLEEP APNEA SYNDROME: ICD-10-CM

## 2019-11-29 PROCEDURE — 95811 POLYSOM 6/>YRS CPAP 4/> PARM: CPT | Performed by: PEDIATRICS

## 2019-11-30 NOTE — PROGRESS NOTES
Completed a all night titration PSG per provider order.    Preliminary AHI >10.  A final therapeutic PAP pressure was achieved.    Supine REM was seen on therapeutic pressure.    Patient reports feeling refreshed in AM.

## 2019-12-03 DIAGNOSIS — G47.33 OSA (OBSTRUCTIVE SLEEP APNEA): Primary | ICD-10-CM

## 2019-12-03 LAB — SLPCOMP: NORMAL

## 2019-12-03 NOTE — PROCEDURES
" SLEEP STUDY INTERPRETATION  TITRATION STUDY      Patient: HAMILTON FUNK  YOB: 1960  Study Date: 11/29/2019  MRN: [id number]  Referring Provider: LORENZO Iraheta CNP, Janelle  Ordering Provider: MD Silva Audrey    Indications for Polysomnography: The patient is a 59 y old Female who is 5' 10\" and weighs 165.0 lbs. Her BMI is 23.9, Floyd sleepiness scale 6.0 and neck circumference is 13.0 cm. She was previously diagnosed with mild obstructive sleep apnea (7/25/2008) with no REM or position predominance.  The apnea hypopnea index was 7 and the oxyhemoglobin saturation myrtle was 83%.  CPAP therapy at 7 cm water pressure was prescribed and the patient has been a faithful user since that time.  Her weight at the time of the study was 170 pounds, similar to today's weight. The patient reports that she has no difficulty using CPAP at sleep initiation although she frequently awakens between 2 and 4 AM with discomfort and feels that she needs to take the CPAP mask off.  This has been occurring for approximately 2 months.  She returns to sleep for the remainder of the night without CPAP.  Snoring and apneic events have not been reported to her with or without CPAP in place.  She has a history of allergies and experiences postnasal drip and rhinorrhea which seem related to changes in temperature and environment.  She experiences joint pain and swelling but this does not interfere with her sleep quality.  A polysomnogram with PAP titration was performed to evaluate for therapeutic PAP pressures.    Polysomnogram Data: A full night polysomnogram recorded the standard physiologic parameters including EEG, EOG, EMG, ECG, nasal and oral airflow. Respiratory parameters of chest and abdominal movements were recorded with respiratory inductance plethysmography. Oxygen saturation was recorded by pulse oximetry. Hypopnea scoring rule used: 1B 4%.    Treatment PSG  Sleep Architecture: Adequate sleep captured for assessment; " all sleep stages were represented. The patient slept supine for the duration of the study.    The total recording time of the polysomnogram was 411.6 minutes. The total sleep time was 361.0 minutes. Sleep latency was decreased at 1.0 minute without the use of a sleep aid. REM latency was 66.5 minutes. Arousal index was normal at 20.8 arousals per hour. Sleep efficiency was mildly decreased at 87.7%. Wake after sleep onset was 47.5 minutes, mostly in the last hour of the study. The patient spent 12.2% of total sleep time in Stage N1, 46.8% in Stage N2, 18.6% in Stage N3, and 22.4% in REM. Time in REM supine was 81.0 minutes.    Respiration: CPAP at 6 cmH20 was most effective at resolving sleep apnea and improving sleep quality. REM/supine sleep was captured at this pressure.    The patient was titrated at pressures ranging from CPAP 5 cmH2O up to CPAP 6 cmH2O. The final pressure achieved was CPAP 6 cmH2O with a residual AHI of 0.3 events per hour.  CPAP at 5 cmH20 also appeared effective during REM/supine sleep although arousals were more prominent at this pressure.  Time in REM supine on final pressure was 63.5 minutes.  This titration was considered optimal (residual AHI < 5 events per hour and including REM supine sleep at final pressure).    Snoring - was reported as absent on treatment.    Respiratory rate and pattern - was notable for normal respiratory rate and pattern.    Sustained Sleep Associated Hypoventilation - Transcutaneous carbon dioxide monitoring was not used, however significant hypoventilation was not suggested by oximetry.    Sleep Associated Hypoxemia - (Greater than 5 minutes O2 sat at or below 88%) was not present. Baseline oxygen saturation was 95.8%. Lowest oxygen saturation was 92.1%. Time spent less than or equal to 88% was 0 minutes. Time spent less than or equal to 89% was 0 minutes.    Movement Activity: Periodic limb movements were not observed.    Periodic Limb Activity - There were 0  "PLMs during the entire study. The PLM index was 0 movements per hour. The PLM Arousal Index was 0 per hour.    REM EMG Activity - Excessive muscle activity was not present.    Nocturnal Behavior - Abnormal sleep related behaviors were not noted during NREM / REM sleep.     Bruxism - None apparent.    Cardiac Summary: Normal sinus rhythm.  The average pulse rate was 59.9 bpm. The minimum pulse rate was 51.0 bpm while the maximum pulse rate was 100.0 bpm. Arrhythmias were not noted.      Assessment:     CPAP at 6 cmH20 was most effective at resolving sleep apnea and improving sleep quality. REM/supine sleep was captured at this pressure.    Recommendations:    Treatment of MIGUEL with CPAP at 6 cmH2O. Recommend clinical follow up with sleep management team, for coaching and review of effectiveness and compliance measures.    Advice regarding the risks of drowsy driving.    Suggest optimizing sleep schedule and avoiding sleep deprivation.    Weight management (if BMI > 30).    Diagnostic Codes:   Obstructive Sleep Apnea G47.33    2019 [place] Titration Sleep Study (165.0 lbs) - Treatment was titrated to a pressure of CPAP 6 with an AHI of 0.3. Time Spent in REM supine at this pressure was 63.5.      _____________________________________   Electronically Signed By: Mago Silva MD  12/3/2019         TITRATION REPORT      Patient Name: HAMILTON FUNK Study Date: 2019   YOB: 1960 Study Type: All night Titration   Age: 59 y MRN: [id number]   Sex: Female Interpreting Provider: eric   BMI: 23.9 Ordering Provider: MD Silva Audrey   Height: 5' 10\" Referring Provider: LORENZO Iraheta CNP, Janelle   Weight: 165.0 lbs Recording Tech: DEMETRIO Gooden   [place]:  Scoring Tech: DEMETRIO Ponce, RST   Neck Size (cm): 13.0 Hypopnea ScorinB 4%     Study Overview    First Lights Off: 11:55:53 PM  COUNT INDEX   Last Lights On: 06:47:30 AM Awakenings: 35 5.8   Time in Bed: 411.6 min Arousals: 125 20.8 "   Total Sleep Time: 361.0 min Apneas & Hypopneas: 1 0.2   Sleep Efficiency: 87.7% Limb Movements: 1 0.2   Sleep Period Time: 409.0 min RDI: 3 0.5   Sleep Latency: 1.0 min      Wake after Sleep Onset: 47.5 min      REM Latency from Sleep Onset: 66.5 min Average Oxygen Saturation: 95.8%      Sleep Architecture   % of Time in Bed  Stages TIME (mins) % SLEEP TIME   WAKE 51.0    Stage N1 44.0 12.2%   Stage N2 169.0 46.8%   Stage N3 67.0 18.6%   REM 81.0 22.4%     Arousal Summary     NREM REM Total Sleep Time Index*   Apnea & Hypopnea Arousals - - - -   PLM Arousals - - - -   Isolated Limb Movement Arousals 1 - 1 0.2   Spontaneous Arousals   90 14 104 17.3   RERAs 2 - 2 0.3   Total 92 14 106 17.6   Arousal Index 19.7 10.4 17.6    Respiratory Summary     By Sleep Stage By Body Position TOTAL    NREM REM REM SUPINE SUPINE NON SUPINE    Sleep Time (min) 280.0 81.0 81.0 361.0 - 361.0            Obstructive Apnea - - - - - -   Mixed Apnea - - - - - -   Central Apnea - 1 1 1 - 1   Total Apneas - 1 1 1 - 1   Total Apnea Index - 0.7 0.7 0.2 - 0.2            Obstructive Hypopnea - - - - - -   Central Hypopnea - - - - - -   Total Hypopneas - - - - - -   Total Hypopnea Index - - - - - -            All Apneas & Hypopneas - 1 1 1 - 1   Central AHI 0.0 0.7 0.7 0.2  0.2   Central AHI%  100.0% 100.0% 100.0%  100.0%   AHI - 0.7 0.7 0.2 - 0.2            RERAs 2 - - 2 - 2   RERA Index 0.4 - - 0.3 - 0.3   RDI 0.4 0.7 0.7 0.5 - 0.5     Oxygen Saturation Summary     WAKE NREM REM   Average OSat (%) 96.2% 95.6% 96.1%   Minimum OSat (%) 92.1% 93.8% 94.0%   Maximum OSat (%) 98.8% 98.7% 98.0%     Oxygen Saturation Durations  Range Time in range (min) Time in range (%)   0   88% 0.0 0.0%   0   89% 0.0 0.0%     Range (%) Time in range (min) Time in range (%)   0.0 - 88.0 - -   0.0 - 89.0 - -     # of Desaturations -   Minimum Oxygen Saturation During Desaturation -     Limb Movement Summary     COUNT INDEX   Isolated Limb Movements 1 0.2   Periodic  Limb Movements (PLMs) - -   PLM Arousals - -   Total Limb Movements 1 0.2     Cardiac Summary     WAKE NREM REM TOTAL   Average Pulse Rate (BPM) 66.1 58.6 60.3 59.9   Minimum Pulse Rate (BPM) 53.0 51.0 51.9 51.0   Maximum Pulse Rate (BPM) 100.0 91.0 74.0 100.0     Cardiac Comments    Sinus Rhythm    Recording Tech Comments  STUDY TYPE: All night Titration  INSURANCE: [employer], perfered one  SLEEP AID TYPE/TIME: None ordered or taken.  PAP ACCLIMATION: Pt was shown all of the pillow type masks the lab carries. Pt uses a small hester.  RESPIRATORY EVENTS (BASELINE):  RERA's and rare central apnea.   SNORING:  N/A.  TCO2 MONITORING AND ABGS: Not ordered.  TITRATION: Pt was started on +5 cmH20, using her hester small mask. Pt was bumped to +6 due to arousals. Pt is on a CPAP of +7 at home. Pt was seen in all stages of sleep supine.  LEAK RATE: within limits.  HOB ELEVATION: Flat, with 2 pillows.  FINAL MASK TYPE/SIZE:  Hester nasal pillows, size small  SLEEP STAGES:  All stages of sleep.  ECG: NSR.  MOVEMENTS/BEHAVIORS: N/A.  CURRENTLY ON TX OR HAVE EQUIPMENT:   Yes, however may need replacement, as > than 5 years.                        Scoring Tech Comments    Scoring using 4% rule.  Patient studied in the sleep lab for concerns related to MIGUEL.   This all night CPAP titration was started at a pressure of 5 cm H2O and titrated to 6 cm H2O which improved her respiratory events during NREM and REM sleep while supine.  Sinus rhythm was noted.    Aleksander BETH, [facility], RST System Clinical Specialist 12/2/2019          CPAP Distribution Summary    CPAP Level TST (min) Wake (min) NREM (min) REM (min) Sleep Eff% Obs. Apneas [place]. Apneas Mix Apneas Hyp s Total  A + H AHI Arousals Count Arousal Index Limb Mov ts Limb Mov ts Index Min OSat Ave OSat   5* 149.5 6.5 132.0 17.5 95.8% - - - - - - 31 12.4 - - 92.1 95.4   6* 211.5 44.5 148.0 63.5 82.6% - 1 - - 1 0.3 75 21.3 1 0.3 93.1 96.0   *=CPAP  Note: BiLevel=IP/EP/Rate;  ASV=EEV/MinPS/MaxPS;

## 2019-12-11 DIAGNOSIS — G47.33 OSA (OBSTRUCTIVE SLEEP APNEA): Primary | ICD-10-CM

## 2019-12-16 ENCOUNTER — CARE COORDINATION (OUTPATIENT)
Dept: SLEEP MEDICINE | Facility: CLINIC | Age: 59
End: 2019-12-16

## 2019-12-20 ENCOUNTER — CARE COORDINATION (OUTPATIENT)
Dept: SLEEP MEDICINE | Facility: CLINIC | Age: 59
End: 2019-12-20

## 2020-06-24 ENCOUNTER — TELEPHONE (OUTPATIENT)
Dept: FAMILY MEDICINE | Facility: CLINIC | Age: 60
End: 2020-06-24

## 2020-06-24 NOTE — TELEPHONE ENCOUNTER
Reason for Call:  Same Day Appointment, Requested Provider:  Kelsey Hart MD    PCP: Kristal Peña    Reason for visit: Spot on forehead.    Duration of symptoms: 1 month gotten larger    Have you been treated for this in the past? No    Additional comments: Please work her in    Can we leave a detailed message on this number? YES    Phone number patient can be reached at: Home number on file [phone] (home)    Best Time: any      Call taken on 6/24/2020 at 2:58 PM by Clementina York

## 2020-06-25 NOTE — TELEPHONE ENCOUNTER
Called patient appointment scheduled with Mary.  Andree SANTOS,RN BSN  [facility]  [phone]

## 2020-07-02 ENCOUNTER — OFFICE VISIT (OUTPATIENT)
Dept: FAMILY MEDICINE | Facility: CLINIC | Age: 60
End: 2020-07-02
Payer: COMMERCIAL

## 2020-07-02 ENCOUNTER — HOSPITAL ENCOUNTER (OUTPATIENT)
Dept: MAMMOGRAPHY | Facility: CLINIC | Age: 60
Discharge: HOME | End: 2020-07-02
Attending: FAMILY MEDICINE | Admitting: FAMILY MEDICINE
Payer: COMMERCIAL

## 2020-07-02 VITALS — SYSTOLIC BLOOD PRESSURE: 102 MMHG | DIASTOLIC BLOOD PRESSURE: 60 MMHG

## 2020-07-02 DIAGNOSIS — Z12.31 VISIT FOR SCREENING MAMMOGRAM: ICD-10-CM

## 2020-07-02 DIAGNOSIS — L82.0 INFLAMED SEBORRHEIC KERATOSIS: Primary | ICD-10-CM

## 2020-07-02 DIAGNOSIS — L81.4 LENTIGINES: ICD-10-CM

## 2020-07-02 PROCEDURE — 77067 SCR MAMMO BI INCL CAD: CPT

## 2020-07-02 PROCEDURE — 17110 DESTRUCTION B9 LES UP TO 14: CPT | Performed by: PHYSICIAN ASSISTANT

## 2020-07-02 PROCEDURE — 99213 OFFICE O/P EST LOW 20 MIN: CPT | Mod: 25 | Performed by: PHYSICIAN ASSISTANT

## 2020-07-02 NOTE — PATIENT INSTRUCTIONS
Proper skin care from [facility]:    -Eliminate harsh soaps as they strip the natural oils from the skin, often resulting in dry itchy skin ( i.e. Dial, Zest, Bhavna Spring)  -Use mild soaps such as Cetaphil or Dove Sensitive Skin in the shower. You do not need to use soap on arms, legs, and trunk every time you shower unless visibly soiled.   -Avoid hot or cold showers.  -After showering, lightly dry off and apply moisturizing within 2-3 minutes. This will help trap moisture in the skin.   -Aggressive use of a moisturizer at least 1-2 times a day to the entire body (including -Vanicream, Cetaphil, Aquaphor or Cerave) and moisturize hands after every washing.  -We recommend using moisturizers that come in a tub that needs to be scooped out, not a pump. This has more of an oil base. It will hold moisture in your skin much better than a water base moisturizer. The above recommended are non-pore clogging.      Wear a sunscreen with at least SPF 30 on your face, ears, neck and V of the chest daily. Wear sunscreen on other areas of the body if those areas are exposed to the sun throughout the day. Sunscreens can contain physical and/or chemical blockers. Physical blockers are less likely to clog pores, these include zinc oxide and titanium dioxide. Reapply every two hour and after swimming. Sunscreen examples include Neutrogena, CeraVe, Blue Lizard, Elta MD and many others.    UV radiation  UVA radiation remains constant throughout the day and throughout the year. It is a longer wavelength than UVB and therefore penetrates deeper into the skin leading to immediate and delayed tanning, photoaging, and skin cancer. 70-80% of UVA and UVB radiation occurs between the hours of 10am-2pm.  UVB radiation  UVB radiation causes the most harmful effects and is more significant during the summer months. However, snow and ice can reflect UVB radiation leading to skin damage during the winter months as well. UVB radiation is  responsible for tanning, burning, inflammation, delayed erythema (pinkness), pigmentation (brown spots), and skin cancer.       WOUND CARE INSTRUCTIONS  FOR CRYOSURGERY        This area treated with liquid nitrogen will form a blister. You do not need to bandage the area until after the blister forms and breaks (which may be a few days).  When the blister breaks, begin daily dressing changes as follows:    1) Clean and dry the area with tap water using clean Q-tip or sterile gauze pad.    2) Apply Polysporin ointment or Bacitracin ointment over entire wound.  Do NOT use Neosporin ointment.    3) Cover the wound with a band-aid or sterile non-stick gauze pad and micropore paper tape.      REPEAT THESE INSTRUCTIONS AT LEAST ONCE A DAY UNTIL THE WOUND HAS COMPLETELY HEALED.        It is an old wives tale that a wound heals better when it is exposed to air and allowed to dry out. The wound will heal faster with a better cosmetic result if it is kept moist with ointment and covered with a bandage.  Do not let the wound dry out.      Supplies Needed:     *Cotton tipped applicators (Q-tips)   *Polysporin ointment or Bacitracin ointment (NOT NEOSPORIN)   *Band-aids, or non stick gauze pads and micropore paper tape    PATIENT INFORMATION    During the healing process you will notice a number of changes. All wounds develop a small halo of redness surrounding the wound.  This means healing is occurring. Severe itching with extensive redness usually indicates sensitivity to the ointment or bandage tape used to dress the wound.  You should call our office if this develops.      Swelling and/or discoloration around your surgical site is common, particularly when performed around the eye.    All wounds normally drain.  The larger the wound the more drainage there will be.  After 7-10 days, you will notice the wound beginning to shrink and new skin will begin to grow.  The wound is healed when you can see skin has formed over the  entire area.  A healed wound has a healthy, shiny look to the surface and is red to dark pink in color to normalize.  Wounds may take approximately 4-6 weeks to heal.  Larger wounds may take 6-8 weeks.  After the wound is healed you may discontinue dressing changes.    You may experience a sensation of tightness as your wound heals. This is normal and will gradually subside.    Your healed wound may be sensitive to temperature changes. This sensitivity improves with time, but if you re having a lot of discomfort, try to avoid temperature extremes.    Patients frequently experience itching after their wound appears to have healed because of the continue healing under the skin.  Plain Vaseline will help relieve the itching.

## 2020-07-02 NOTE — LETTER
2020         RE: Alfonzo Cho  [address] [address] [facility] [place] [id number]        Dear Colleague,    Thank you for referring your patient, Alfonzo Cho, to the [facility]. Please see a copy of my visit note below.    HPI:  Alfonzo Cho is a 60 year old female patient here today for new spot on forehead .  Patient states this has been present for a while.  Patient reports the following symptoms: growing, itchy, bothersome .  Patient reports the following previous treatments: none.  Patient reports the following modifying factors: none.  Associated symptoms: none.  Patient has no other skin complaints today.  Remainder of the HPI, Meds, PMH, Allergies, FH, and SH was reviewed in chart.    Pertinent Hx:   .No personal or family history of skin cancer    Past Medical History:   Diagnosis Date     Aortic calcification (H) 2016     Asymptomatic varicose veins      Contact dermatitis and other eczema, due to unspecified cause     seasonally     Nonrheumatic aortic valve insufficiency - mild - no symptoms.     incidental finding on thoracic XR     Papanicolaou smear of cervix with atypical squamous cells of undetermined significance (ASC-US) 16    Neg HR HPV     Phlebitis and thrombophlebitis of femoral vein (deep) (superficial) (H) not candidate for HRT     popliteal after progesterone oral therapy - no other clots      Sleep apnea     now on CPAP        Past Surgical History:   Procedure Laterality Date      SECTION      S/P CSECT X 3     ENDOSCOPIC STRIPPING VEIN(S)  92,02    S/P VEIN STRIPPING      HC COLONOSCOPY THRU STOMA, DIAGNOSTIC  2009     [place] gastro - normal - repeat in 2019      TUBAL LIGATION  1995    with 3rd         Family History   Problem Relation Age of Onset     Neurologic Disorder Father         ALS     Hypothyroidism Father      Glaucoma Father         glaucoma     Osteoporosis Mother         on Fosamax      Uterine Cancer Mother 78         endometrial cancer s/p hysterectomy at age 80 - still alive      Esophageal Cancer Maternal Uncle      Esophageal Cancer Maternal Uncle      Hearing Loss Brother      No Known Problems Son      No Known Problems Daughter      No Known Problems Son      Hearing Loss Brother      Hearing Loss Brother      C.A.D. Maternal Grandfather          age 62     Diabetes Paternal Uncle      Cerebrovascular Disease Maternal Grandmother          age 70      No Known Problems Paternal Grandmother      No Known Problems Paternal Grandfather        Social History     Socioeconomic History     Marital status:      Spouse name: Not on file     Number of children: Not on file     Years of education: Not on file     Highest education level: Not on file   Occupational History     Occupation: Dental Hygienist     Employer: [employer]   Social Needs     Financial resource strain: Not on file     Food insecurity     Worry: Not on file     Inability: Not on file     Transportation needs     Medical: Not on file     Non-medical: Not on file   Tobacco Use     Smoking status: Former Smoker     Years: 2.00     Types: Cigarettes     Start date: 1978     Last attempt to quit: 1980     Years since quittin.5     Smokeless tobacco: Never Used     Tobacco comment: only smoked a couple of years   Substance and Sexual Activity     Alcohol use: Yes     Alcohol/week: 0.0 standard drinks     Comment: 0-2 drinks per week     Drug use: No     Comment: no herbal meds either      Sexual activity: Yes     Partners: Male     Comment:  - s/p tubal ligation    Lifestyle     Physical activity     Days per week: Not on file     Minutes per session: Not on file     Stress: Not on file   Relationships     Social connections     Talks on phone: Not on file     Gets together: Not on file     Attends Sabianism service: Not on file     Active member of club or organization: Not on file     Attends meetings of clubs or  organizations: Not on file     Relationship status: Not on file     Intimate partner violence     Fear of current or ex partner: Not on file     Emotionally abused: Not on file     Physically abused: Not on file     Forced sexual activity: Not on file   Other Topics Concern     Parent/sibling w/ CABG, MI or angioplasty before 65F 55M? No   Social History Narrative    calcium - gets 2-3 large dairy servings /day.     colonoscopy - done 9/2009 = normal - repeat in 10 years     sun precautions - discussed     mammogram - yearly     Td booster - 3/20/2000 and today July 28, 2009     pneumovax -at age 58     DEXA -consider next year     stool hemoccults - every year after age 40    ASA- start 81mg asa daily     mulvitamin - encouraged        Outpatient Encounter Medications as of 7/2/2020   Medication Sig Dispense Refill     fish oil-omega-3 fatty acids 1000 MG capsule Take 1 g by mouth daily       Psyllium 58.12 % PACK        ibuprofen (ADVIL/MOTRIN) 200 MG capsule Take 200 mg by mouth every 4 hours as needed for fever       No facility-administered encounter medications on file as of 7/2/2020.        Review Of Systems:  Skin: new spot  Eyes: negative  Ears/Nose/Throat: negative  Respiratory: No shortness of breath, dyspnea on exertion, cough, or hemoptysis  Cardiovascular: negative  Gastrointestinal: negative  Genitourinary: negative  Musculoskeletal: negative  Neurologic: negative  Psychiatric: negative  Hematologic/Lymphatic/Immunologic: negative  Endocrine: negative      Objective:     /60   LMP 05/01/2009   Breastfeeding No   Eyes: Conjunctivae/lids: Normal   ENT: Lips:  Normal  MSK: Normal  Cardiovascular: Peripheral edema none  Pulm: Breathing Normal  Neuro/Psych: Orientation: A/O x 3 Normal; Mood/Affect: Normal, NAD, WDWN  Pt accompanied by: self  Following areas examined: Face ( patient is wearing face mask), neck, hands, v of chest    Mata skin type:ii   Findings:  Manzo WD smooth macules on face,  chest  Inflamed brown, stuck-on scaly appearing papules on forehead x 1  Assessment and Plan:  1) ISK x 1  Benign etiology and course of lesion.  LN2: Treated with LN2 for 5s for 1-2 cycles. Warned risks of blistering, pain, pigment change, scarring, and incomplete resolution.  Advised patient to return if lesions do not completely resolve within 2-3 months.  Wound care sheet given.   Lesion may recur and/or may not completely resolve. May need additional treatment.  Different removal options including excision, cryotherapy, cautery and /or laser.      2) Lentigines    Treatment of these lesions would be purely cosmetic and not medically neccessary.  Lesion may recur and/or may not completely resolve. May need additional treatment.  Different removal options including excision, cryotherapy, cautery and /or laser.      Signs and Symptoms of non-melanoma skin cancer and ABCDEs Patient was asked about new or changing moles/lesions on body.   Wear a sunscreen with at least SPF 30 on your face, ears, neck and V of the chest daily. Wear sunscreen on other areas of the body if those areas are exposed to the sun throughout the day. Sunscreens can contain physical and/or chemical blockers. Physical blockers are less likely to clog pores, these include zinc oxide and titanium dioxide. Reapply every two hour and after swimming. Sunscreen examples include Neutrogena, CeraVe, Blue Lizard, Elta MD and many others.            Follow up in yearly FBE      Again, thank you for allowing me to participate in the care of your patient.        Sincerely,        Carolyn Contreras PA-C

## 2020-07-02 NOTE — PROGRESS NOTES
HPI:  Alfonzo Cho is a 60 year old female patient here today for new spot on forehead .  Patient states this has been present for a while.  Patient reports the following symptoms: growing, itchy, bothersome .  Patient reports the following previous treatments: none.  Patient reports the following modifying factors: none.  Associated symptoms: none.  Patient has no other skin complaints today.  Remainder of the HPI, Meds, PMH, Allergies, FH, and SH was reviewed in chart.    Pertinent Hx:   .No personal or family history of skin cancer    Past Medical History:   Diagnosis Date     Aortic calcification (H) 2016     Asymptomatic varicose veins      Contact dermatitis and other eczema, due to unspecified cause     seasonally     Nonrheumatic aortic valve insufficiency - mild - no symptoms.     incidental finding on thoracic XR     Papanicolaou smear of cervix with atypical squamous cells of undetermined significance (ASC-US) 16    Neg HR HPV     Phlebitis and thrombophlebitis of femoral vein (deep) (superficial) (H) not candidate for HRT     popliteal after progesterone oral therapy - no other clots      Sleep apnea     now on CPAP        Past Surgical History:   Procedure Laterality Date      SECTION      S/P CSECT X 3     ENDOSCOPIC STRIPPING VEIN(S)  92,02    S/P VEIN STRIPPING      HC COLONOSCOPY THRU STOMA, DIAGNOSTIC  2009     [place] - normal - repeat in 2019      TUBAL LIGATION  1995    with 3rd         Family History   Problem Relation Age of Onset     Neurologic Disorder Father         ALS     Hypothyroidism Father      Glaucoma Father         glaucoma     Osteoporosis Mother         on Fosamax      Uterine Cancer Mother 78        endometrial cancer s/p hysterectomy at age 80 - still alive      Esophageal Cancer Maternal Uncle      Esophageal Cancer Maternal Uncle      Hearing Loss Brother      No Known Problems Son      No Known Problems Daughter      No Known Problems Son       Hearing Loss Brother      Hearing Loss Brother      C.A.D. Maternal Grandfather          age 62     Diabetes Paternal Uncle      Cerebrovascular Disease Maternal Grandmother          age 70      No Known Problems Paternal Grandmother      No Known Problems Paternal Grandfather        Social History     Socioeconomic History     Marital status:      Spouse name: Not on file     Number of children: Not on file     Years of education: Not on file     Highest education level: Not on file   Occupational History     Occupation: Dental Hygienist     Employer: [employer]   Social Needs     Financial resource strain: Not on file     Food insecurity     Worry: Not on file     Inability: Not on file     Transportation needs     Medical: Not on file     Non-medical: Not on file   Tobacco Use     Smoking status: Former Smoker     Years: 2.00     Types: Cigarettes     Start date: 1978     Last attempt to quit: 1980     Years since quittin.5     Smokeless tobacco: Never Used     Tobacco comment: only smoked a couple of years   Substance and Sexual Activity     Alcohol use: Yes     Alcohol/week: 0.0 standard drinks     Comment: 0-2 drinks per week     Drug use: No     Comment: no herbal meds either      Sexual activity: Yes     Partners: Male     Comment:  - s/p tubal ligation    Lifestyle     Physical activity     Days per week: Not on file     Minutes per session: Not on file     Stress: Not on file   Relationships     Social connections     Talks on phone: Not on file     Gets together: Not on file     Attends Yazidism service: Not on file     Active member of club or organization: Not on file     Attends meetings of clubs or organizations: Not on file     Relationship status: Not on file     Intimate partner violence     Fear of current or ex partner: Not on file     Emotionally abused: Not on file     Physically abused: Not on file     Forced sexual activity: Not on file   Other  Topics Concern     Parent/sibling w/ CABG, MI or angioplasty before 65F 55M? No   Social History Narrative    calcium - gets 2-3 large dairy servings /day.     colonoscopy - done 9/2009 = normal - repeat in 10 years     sun precautions - discussed     mammogram - yearly     Td booster - 3/20/2000 and today July 28, 2009     pneumovax -at age 58     DEXA -consider next year     stool hemoccults - every year after age 40    ASA- start 81mg asa daily     mulvitamin - encouraged        Outpatient Encounter Medications as of 7/2/2020   Medication Sig Dispense Refill     fish oil-omega-3 fatty acids 1000 MG capsule Take 1 g by mouth daily       Psyllium 58.12 % PACK        ibuprofen (ADVIL/MOTRIN) 200 MG capsule Take 200 mg by mouth every 4 hours as needed for fever       No facility-administered encounter medications on file as of 7/2/2020.        Review Of Systems:  Skin: new spot  Eyes: negative  Ears/Nose/Throat: negative  Respiratory: No shortness of breath, dyspnea on exertion, cough, or hemoptysis  Cardiovascular: negative  Gastrointestinal: negative  Genitourinary: negative  Musculoskeletal: negative  Neurologic: negative  Psychiatric: negative  Hematologic/Lymphatic/Immunologic: negative  Endocrine: negative      Objective:     /60   LMP 05/01/2009   Breastfeeding No   Eyes: Conjunctivae/lids: Normal   ENT: Lips:  Normal  MSK: Normal  Cardiovascular: Peripheral edema none  Pulm: Breathing Normal  Neuro/Psych: Orientation: A/O x 3 Normal; Mood/Affect: Normal, NAD, WDWN  Pt accompanied by: self  Following areas examined: Face ( patient is wearing face mask), neck, hands, v of chest    Mata skin type:ii   Findings:  Manzo WD smooth macules on face, chest  Inflamed brown, stuck-on scaly appearing papules on forehead x 1  Assessment and Plan:  1) ISK x 1  Benign etiology and course of lesion.  LN2: Treated with LN2 for 5s for 1-2 cycles. Warned risks of blistering, pain, pigment change, scarring, and  incomplete resolution.  Advised patient to return if lesions do not completely resolve within 2-3 months.  Wound care sheet given.   Lesion may recur and/or may not completely resolve. May need additional treatment.  Different removal options including excision, cryotherapy, cautery and /or laser.      2) Lentigines    Treatment of these lesions would be purely cosmetic and not medically neccessary.  Lesion may recur and/or may not completely resolve. May need additional treatment.  Different removal options including excision, cryotherapy, cautery and /or laser.      Signs and Symptoms of non-melanoma skin cancer and ABCDEs Patient was asked about new or changing moles/lesions on body.   Wear a sunscreen with at least SPF 30 on your face, ears, neck and V of the chest daily. Wear sunscreen on other areas of the body if those areas are exposed to the sun throughout the day. Sunscreens can contain physical and/or chemical blockers. Physical blockers are less likely to clog pores, these include zinc oxide and titanium dioxide. Reapply every two hour and after swimming. Sunscreen examples include Neutrogena, CeraVe, Blue Lizard, Elta MD and many others.            Follow up in yearly FBE

## 2020-07-27 ENCOUNTER — TELEPHONE (OUTPATIENT)
Dept: SLEEP MEDICINE | Facility: CLINIC | Age: 60
End: 2020-07-27

## 2020-07-27 DIAGNOSIS — G47.33 OSA (OBSTRUCTIVE SLEEP APNEA): Primary | ICD-10-CM

## 2020-07-27 NOTE — TELEPHONE ENCOUNTER
Patient called today.    [facility]    Patient would like a new order for cpap supplies.    Patient is requesting order to be emailed to her at [email or web address].    Please contact patient.    Thank you.    Central Scheduling  Mendy BROWER

## 2020-07-28 NOTE — TELEPHONE ENCOUNTER
Left message on answering machine to call the clinic RN.  Tuyet Black RN     Order in the chart. Please call patient to verify DME of choice. Thanks.

## 2020-08-28 ENCOUNTER — DOCUMENTATION ONLY (OUTPATIENT)
Dept: SLEEP MEDICINE | Facility: CLINIC | Age: 60
End: 2020-08-28
Payer: COMMERCIAL

## 2020-08-28 DIAGNOSIS — G47.33 OSA (OBSTRUCTIVE SLEEP APNEA): Primary | ICD-10-CM

## 2020-08-28 NOTE — PROGRESS NOTES
STM Recheck: Patient called looking for an order so she can get new CPAP supplies.  Spoke with Provider who will sign an order.

## 2020-09-01 ENCOUNTER — DOCUMENTATION ONLY (OUTPATIENT)
Dept: SLEEP MEDICINE | Facility: CLINIC | Age: 60
End: 2020-09-01
Payer: COMMERCIAL

## 2020-09-01 NOTE — PROGRESS NOTES
STM Recheck: Called patient to let her know [facility] found her study from 2008 and now she will be able to get her CPAP supplies.

## 2020-09-02 ENCOUNTER — DOCUMENTATION ONLY (OUTPATIENT)
Dept: SLEEP MEDICINE | Facility: CLINIC | Age: 60
End: 2020-09-02

## 2020-09-02 NOTE — PROGRESS NOTES
RECD SLEEP STUDY/ INTERP ( SCANNED INTO BT)  CALL AND LEFT VM TO CALL [phone] TO ORDER CPAP SUPPLIES.

## 2020-10-19 ENCOUNTER — TRANSFERRED RECORDS (OUTPATIENT)
Dept: HEALTH INFORMATION MANAGEMENT | Facility: CLINIC | Age: 60
End: 2020-10-19

## 2020-11-10 DIAGNOSIS — G47.33 OSA (OBSTRUCTIVE SLEEP APNEA): Primary | ICD-10-CM

## 2020-11-16 ENCOUNTER — HEALTH MAINTENANCE LETTER (OUTPATIENT)
Age: 60
End: 2020-11-16

## 2020-12-31 ENCOUNTER — TELEPHONE (OUTPATIENT)
Dept: SLEEP MEDICINE | Facility: CLINIC | Age: 60
End: 2020-12-31

## 2020-12-31 NOTE — TELEPHONE ENCOUNTER
Pt called left a msg stating she would like to make a follow-up appointment. I clld pt back to schedule, JOSE.

## 2021-01-07 VITALS — HEIGHT: 71 IN | BODY MASS INDEX: 24.5 KG/M2 | WEIGHT: 175 LBS

## 2021-01-07 RX ORDER — MULTIVITAMIN WITH IRON
TABLET ORAL
COMMUNITY
Start: 2020-02-01 | End: 2022-10-06

## 2021-01-07 RX ORDER — FLUTICASONE PROPIONATE 50 MCG
SPRAY, SUSPENSION (ML) NASAL
COMMUNITY
Start: 2020-03-02 | End: 2021-03-02

## 2021-01-07 RX ORDER — OMEGA-3-ACID ETHYL ESTERS 1 G/1
1 CAPSULE, LIQUID FILLED ORAL
COMMUNITY
End: 2021-07-01

## 2021-01-07 ASSESSMENT — MIFFLIN-ST. JEOR: SCORE: 1449.92

## 2021-01-07 NOTE — PROGRESS NOTES
Alfonzo Cho is a 60 year old female who is being evaluated via a billable telephone visit.      What phone number would you like to be contacted at? [phone]  How would you like to obtain your AVS? Salomon    Phone call duration: 13 minutes    [facility]  Outpatient Sleep Medicine Encounter, Established Patient      Name: Alfonzo Cho MRN# [id number]   Age: 60 year old YOB: 1960     Date of Visit: January 13, 2021  Primary care provider: Kristal Peña         Assessment and Plan:     1. MIGUEL (obstructive sleep apnea)  I advised the patient to treat her sleep apnea on a nightly basis, either with CPAP or oral appliance therapy.  Once her oral appliance is adjusted to her liking and she is able to tolerate nightly use, evaluation of its efficacy is indicated.  At that time, a home sleep apnea test will be performed.  I placed an order in her chart today so that she may call the office to schedule a home sleep apnea test with the oral appliance in place when she is ready.  I also asked her to schedule a follow-up visit with me 1 to 2 weeks later so that we can review the results of the test and determine if further adjustment of the oral appliance would be necessary.         Chief Complaint:   Discuss oral appliance and CPAP         History of Present Illness:     Alfonzo Cho is a 60 year old female whose visit was conducted via telephone today.  She was previously diagnosed with mild obstructive sleep apnea (7/25/2008) with no REM or position predominance.  The apnea hypopnea index was 7 and the oxyhemoglobin saturation myrtle was 83%.  Currently she is using CPAP at 6 cm water pressure which was found to be effective on treatment polysomnogram November 29, 2019 (weight 170 lb).  However, she has been working with dentist Josue Garcia in constructing a oral appliance for sleep disordered breathing.  It is her goal to use the oral appliance 100%  of the time.      Currently the issue is teeth movement as result of using the oral appliance.  However, there is a plan in place for resolving this.  She anticipates being able to be on study oral appliance therapy in 2 to 3 months.  In the meantime, she has resumed use of her CPAP machine on a nightly basis.      She has questions regarding alternating treatment with oral appliance therapy and CPAP machine, particularly as it relates to insurance coverage.  I had a discussion with her about the 2 therapies and all questions were answered.           Medications:     Current Outpatient Medications   Medication Sig     cholecalciferol (VITAMIN D3) 25 mcg (1000 units) capsule      fluticasone (FLONASE) 50 MCG/ACT nasal spray 2 sprays in each nostril daily x1 week then 1-2 sprays in each nostril daily. (use lowest possible dose after week 1)     magnesium 250 MG tablet      omega-3 acid ethyl esters (LOVAZA) 1 g capsule Take 1 g by mouth     psyllium (METAMUCIL/KONSYL) capsule      No current facility-administered medications for this visit.         Allergies   Allergen Reactions     Penicillins      IV penicillin- Hives, edema hands and feet            Review of Systems:     Review of systems was negative other than HPI or as commented below.         Past Medical History:     Past Medical History:   Diagnosis Date     Aortic calcification (H) 4/22/2016     Asymptomatic varicose veins      Contact dermatitis and other eczema, due to unspecified cause     seasonally     Nonrheumatic aortic valve insufficiency - mild - no symptoms.     incidental finding on thoracic XR     Papanicolaou smear of cervix with atypical squamous cells of undetermined significance (ASC-US) 07/01/16    Neg HR HPV     Phlebitis and thrombophlebitis of femoral vein (deep) (superficial) (H) not candidate for HRT     popliteal after progesterone oral therapy - no other clots      Sleep apnea     now on CPAP              Past Surgical History:   "    Past Surgical History:   Procedure Laterality Date      SECTION      S/P CSECT X 3     ENDOSCOPIC STRIPPING VEIN(S)  92,02    S/P VEIN STRIPPING      HC COLONOSCOPY THRU STOMA, DIAGNOSTIC  2009     [place] [facility] - normal - repeat in 2019      TUBAL LIGATION  1995    with 3rd             Physical Examination:   Ht 1.803 m (5' 11\")   Wt 79.4 kg (175 lb)   LMP 2009   BMI 24.41 kg/m     Exam could not be conducted because this was a telephone visit.  Patient was noted to be cooperative and pleasant; respirations were unlabored.        Mago Cruz MD   2021   [address], [address]  [place] [address] [phone]    Copy to: Kristal Peña  "

## 2021-01-07 NOTE — PATIENT INSTRUCTIONS
Your BMI is Body mass index is 24.41 kg/m .  Weight management is a personal decision.  If you are interested in exploring weight loss strategies, the following discussion covers the approaches that may be successful. Body mass index (BMI) is one way to tell whether you are at a healthy weight, overweight, or obese. It measures your weight in relation to your height.  A BMI of 18.5 to 24.9 is in the healthy range. A person with a BMI of 25 to 29.9 is considered overweight, and someone with a BMI of 30 or greater is considered obese. More than two-thirds of American adults are considered overweight or obese.  Being overweight or obese increases the risk for further weight gain. Excess weight may lead to heart disease and diabetes.  Creating and following plans for healthy eating and physical activity may help you improve your health.  Weight control is part of healthy lifestyle and includes exercise, emotional health, and healthy eating habits. Careful eating habits lifelong are the mainstay of weight control. Though there are significant health benefits from weight loss, long-term weight loss with diet alone may be very difficult to achieve- studies show long-term success with dietary management in less than 10% of people. Attaining a healthy weight may be especially difficult to achieve in those with severe obesity. In some cases, medications, devices and surgical management might be considered.  What can you do?  If you are overweight or obese and are interested in methods for weight loss, you should discuss this with your provider.     Consider reducing daily calorie intake by 500 calories.     Keep a food journal.     Avoiding skipping meals, consider cutting portions instead.    Diet combined with exercise helps maintain muscle while optimizing fat loss. Strength training is particularly important for building and maintaining muscle mass. Exercise helps reduce stress, increase energy, and improves fitness.  Increasing exercise without diet control, however, may not burn enough calories to loose weight.       Start walking three days a week 10-20 minutes at a time    Work towards walking thirty minutes five days a week     Eventually, increase the speed of your walking for 1-2 minutes at time    In addition, we recommend that you review healthy lifestyles and methods for weight loss available through the National Institutes of Health patient information sites:  http://win.niddk.nih.gov/publications/index.htm    And look into health and wellness programs that may be available through your health insurance provider, employer, local community center, or sandi club.

## 2021-01-13 ENCOUNTER — VIRTUAL VISIT (OUTPATIENT)
Dept: SLEEP MEDICINE | Facility: CLINIC | Age: 61
End: 2021-01-13
Payer: COMMERCIAL

## 2021-01-13 DIAGNOSIS — G47.33 OSA (OBSTRUCTIVE SLEEP APNEA): Primary | ICD-10-CM

## 2021-01-13 PROCEDURE — 99212 OFFICE O/P EST SF 10 MIN: CPT | Mod: TEL | Performed by: PEDIATRICS

## 2021-01-15 ENCOUNTER — HEALTH MAINTENANCE LETTER (OUTPATIENT)
Age: 61
End: 2021-01-15

## 2021-02-02 ENCOUNTER — TELEPHONE (OUTPATIENT)
Dept: FAMILY MEDICINE | Facility: CLINIC | Age: 61
End: 2021-02-02

## 2021-02-02 NOTE — TELEPHONE ENCOUNTER
Pt stated that she has noticed the last 5-6 days she has had the smell of cigarette smoke in her nasal passages. It comes in waves. Pt stated that it is not always in the same space. It happens randomly throughout the day. She does not live with smokers or hangout with smokers.     She cannot taste the smoke its all in her nasal passages.   She does have some blurred vision - but is unsure if its r/t her cataracts   Has some post nasal drip - but this is normal for her but has noticed an increase in the drip in the last few weeks    Denies: headaches. Fevers, chills, sore throat, cough, dizziness, N/V, facial congestion, loss of appetite, blood loss     Best #   Telephone Information:   Mobile [phone] ok    [phone] (home)  Ok         Please advise on the above     Should pt be seen?     Thank you     Michelle Mello RN, BSN  [facility]

## 2021-02-05 NOTE — TELEPHONE ENCOUNTER
Patient calling back. Still smelling the smoke. Patient rolled over in bed and got very dizzy so she is concerned she has sinus drainage as well. Please advise

## 2021-02-11 NOTE — TELEPHONE ENCOUNTER
Apologize to pt , please for not getting back to her sooner. Out of office the last several days.      She needs to be seen - needs COVID-19 novel coronavirus testing and possible treatment for sinus infection as well.

## 2021-02-12 NOTE — TELEPHONE ENCOUNTER
Called # [phone]     Pt called, noted symptoms have not been present now for several days. Pt is feeling better as well. Writer advised if symptoms return to be seen. Patient stated an understanding and agreed with plan.  Pt inquired if should get antibody test for Covid. Writer advised this can be done but not for another 10-14 days for best results.      Jeevan Vieira RN   [facility]

## 2021-02-13 ENCOUNTER — HOSPITAL ENCOUNTER (EMERGENCY)
Facility: CLINIC | Age: 61
Discharge: HOME | End: 2021-02-13
Attending: PHYSICIAN ASSISTANT | Admitting: PHYSICIAN ASSISTANT
Payer: COMMERCIAL

## 2021-02-13 ENCOUNTER — APPOINTMENT (OUTPATIENT)
Dept: GENERAL RADIOLOGY | Facility: CLINIC | Age: 61
End: 2021-02-13
Attending: PHYSICIAN ASSISTANT
Payer: COMMERCIAL

## 2021-02-13 VITALS
OXYGEN SATURATION: 99 % | SYSTOLIC BLOOD PRESSURE: 117 MMHG | RESPIRATION RATE: 18 BRPM | WEIGHT: 178 LBS | TEMPERATURE: 97.8 F | HEART RATE: 73 BPM | DIASTOLIC BLOOD PRESSURE: 69 MMHG | BODY MASS INDEX: 24.83 KG/M2

## 2021-02-13 DIAGNOSIS — S92.151A CLOSED DISPLACED AVULSION FRACTURE OF RIGHT TALUS, INITIAL ENCOUNTER: ICD-10-CM

## 2021-02-13 DIAGNOSIS — S82.832A CLOSED FRACTURE OF PROXIMAL END OF LEFT FIBULA, UNSPECIFIED FRACTURE MORPHOLOGY, INITIAL ENCOUNTER: ICD-10-CM

## 2021-02-13 PROCEDURE — 73562 X-RAY EXAM OF KNEE 3: CPT | Mod: LT

## 2021-02-13 PROCEDURE — 73590 X-RAY EXAM OF LOWER LEG: CPT | Mod: LT

## 2021-02-13 PROCEDURE — 27780 TREATMENT OF FIBULA FRACTURE: CPT | Mod: LT

## 2021-02-13 PROCEDURE — 99284 EMERGENCY DEPT VISIT MOD MDM: CPT | Mod: 25

## 2021-02-13 PROCEDURE — 73630 X-RAY EXAM OF FOOT: CPT | Mod: RT

## 2021-02-13 PROCEDURE — 73610 X-RAY EXAM OF ANKLE: CPT | Mod: RT

## 2021-02-13 ASSESSMENT — ENCOUNTER SYMPTOMS
NECK PAIN: 0
HEADACHES: 0
ARTHRALGIAS: 1

## 2021-02-13 NOTE — ED PROVIDER NOTES
History   Chief Complaint:  Fall       HPI   Alfonzo Cho is a 60 year old female with history of aortic calcification and varicose veins who presents with fall. The patient states that she was on the stairs when her left knee gave out and she fell on the stairs, rolling her right ankle. She says she was able to walk afterward with assistance. She is not on blood thinners. She states she took 800 mg ibuprofen prior to arrival, which helped. The patient denies hitting her head, neck pain, or any other injuries.      Review of Systems   Musculoskeletal: Positive for arthralgias (left knee, right ankle). Negative for neck pain.   Neurological: Negative for headaches.   All other systems reviewed and are negative.      Allergies:  Penicillins    Medications:  Flonase    Past Medical History:    Aortic calcification  Asymptomatic varicose veins  Contact dermatitis and other eczema  Nonrheumatic aortic valve insufficiency, mild  Phlebitis and thrombophlebitis of femoral vein  Sleep apnea  GERD  Memory difficulties  Infrapatellar bursitis  Plantar fasciitis       Past Surgical History:     section x 3  Endoscopic stripping vein  Colonoscopy thru stoma, diagnostic  Tubal ligation  [place] tooth extraction       Family History:    ALS  Hypothyroidism  Glaucoma  Osteoporosis  Uterine cancer      Social History:  Unaccompanied to ED.  Lives with .    Physical Exam     Patient Vitals for the past 24 hrs:   BP Temp Temp src Pulse Resp SpO2 Weight   21 1606 117/69 97.8  F (36.6  C) Temporal 73 18 99 % 80.7 kg (178 lb)       Physical Exam    General: Alert and oriented.    Head: Normocephalic.  External ears and nose normal.    Eyes: Pupils equal and round.  Normal tracking.    Pulmonary/Chest: Effort and rate normal.  No peripheral edema.    MSK: Tenderness to palpation and swelling over the left proximal fibula.  No patellar tenderness, tibial plateau tenderness.  Normal range of motion in knee without  effusion.  No gross ligamentous laxity.  Normal extensor mechanism.  There is swelling and tenderness to palpation over the left lateral malleolus of the ankle.  There is no tenderness over the forefoot, knee, or proximal tibia or fibula.  SKIN:  Warm and dry with strong DP,PT pulse and normal capillary refill. No rashes or crepitance.    NEURO:  Normal sensation throughout the foot and ankle. No foot drop.    PSYCH:  Normal affect      Emergency Department Course     Imaging:  XR Tibia & Fibula Left 2 Views  Acute minimally displaced fracture of the proximal fibular diaphysis. There are transversely and longitudinally oriented fracture lines. No intra-articular extension. Normal joint alignment. Varicose veins.  Reading per radiology    XR Knee Left 3 Views  Minimally displaced fracture of the proximal fibular diaphysis, incompletely imaged. There is normal joint alignment. Mild lateral and patellofemoral compartment degenerative changes. No knee joint effusion.  Reading per radiology    Ankle XR, G/E 3 views, right  Acute mildly displaced avulsion fracture at the dorsal talar head. Small ossicles adjacent to the lateral malleolus and lateral talus appear chronic although are strictly age-indeterminate. Mild soft tissue swelling over the lateral   malleolus. There is normal joint spacing and alignment. The ankle mortise is congruent. Small plantar calcaneal enthesophyte. Small chronic ossicle along the plantar fascia. Soft tissue calcification in the lower leg.  Reading per radiology    Foot  XR, G/E 3 views, right  Linear radiodensity dorsal to the talar head is most concerning for an acute mildly displaced avulsion fracture. There is normal joint spacing and alignment. Small plantar calcaneal enthesophyte. Small chronic ossicle along the plantar   fascia.  Reading per radiology      Emergency Department Course:    Reviewed:  I reviewed nursing notes, vitals, past medical history and care  everywhere    Assessments:  1630 I obtained history and examined the patient as noted above.    I rechecked the patient and explained findings.     Consults:    I consulted Dr. Pressley of orthopedics regarding the patient's x-ray results.    Disposition:  The patient was discharged to home.     Impression & Plan     Medical Decision Makin-year-old female presents with bilateral lower extremity pain after mechanical fall.  She complains of pain in her proximal lower leg on the left side, as well as the outer portion of her right ankle.  Unfortunately x-rays demonstrate presence of a proximal fibular shaft fracture on the left, as well as a likely talar head avulsion fracture on the right.  Examination of the joints above and below are not suggestive of referred pain.  CMS is intact distally bilaterally.  Head to toe trauma exam otherwise nonconcerning.  I did discuss with orthopedics as above who advised that both of these injuries can be weightbearing as tolerated after reviewing the images.  At this time plan will be for a orthopedic walking boot on the right side with a knee immobilizer to be used as needed on the left and crutches.  Follow-up with orthopedics in 1 to 2 weeks as an outpatient.  Return for signs of neurovascular compromise, compartment syndrome, increased pain etc.  Patient declines prescription pain medication at this time and feels she will be fine with OTC analgesics and conservative treatment.      Diagnosis:    ICD-10-CM    1. Closed fracture of proximal end of left fibula, unspecified fracture morphology, initial encounter  S82.832A    2. Closed displaced avulsion fracture of right talus, initial encounter  S92.151A        Discharge Medications:  New Prescriptions    No medications on file       Scribe Disclosure:  Damaris MILES, am serving as a scribe at 4:17 PM on 2021 to document services personally performed by Manuel Yun PA-C based on my observations  and the provider's statements to me.      Manuel Yun PA-C  02/13/21 6732

## 2021-02-13 NOTE — ED TRIAGE NOTES
"Pt was walking down stairs when her L knee gave out. Pt has hx of \"bone on bone\" knee and knee giving out. Pt states L knee hyperflexed and rolled R ankle rolling outward laterally. L knee edema and R ankle edema. Pt has been unable to stand since fall 2 hours PTA. Denies hitting head, dizziness, or LOC. Pt took 1 800 mg ibuprofen PTA. ABC in tact. A/OX4  "

## 2021-02-13 NOTE — ED PROVIDER NOTES
Emergency Department Attending Supervision Note  2/13/2021  5:43 PM      I evaluated this patient in conjunction with Manuel Yun PA-C      Briefly, the patient presented with bilateral leg pain.  Patient reports she was walking down the stairs when her left knee gave out.  She then inverted her right ankle and flexed her left leg resulting in pain over the lateral malleolus of the right ankle and pain to the lateral aspect of the lower leg on the left.  She was able to tolerate mild weightbearing but required crutches to mobilize.  Pain is constant, aggravated by any weightbearing.  She denies any other associated injury, numbness or weakness.      On my exam,     EYES:   Conjunctiva normal.     2+ DP pulses bilateral.  PULM:    No respiratory distress.      No stridor.  MSK:     Left lower extremity : No gross deformity.       No bony tenderness to the knee or ankle.      Moderate tenderness to the proximal fibula.      Achilles tendon is palpated without tenderness and without defect.        No bony tenderness to the foot.     Left lower extremity : No gross deformity.       No bony tenderness to the knee       No tenderness to the proximal fibula.      Achilles tendon is palpated without tenderness and without defect.        Mild soft tissue swelling of the lateral malleolus with focal tenderness      Mild bony tenderness to the dorsum of the foot  LYMPH:   No cervical lymphadenopathy.  NEURO:   Alert.      Lower extremities:      Strength and sensation intact.  SKIN:    Warm, dry and intact.    PSYCH:    Mood is good and affect is appropriate.    X-ray of the right lower extremity reveal an avulsion fracture to the talar head but no other associated fracture.  Unfortunately she also has a fracture to the proximal fibula fibula on the left.  We spoke with orthopedic surgery given the bilateral nature of her findings to determine the best way to immobilize the patient.  They recommended Ortho boot on  the right.  They offered weightbearing versus knee immobilizer on the left.  Patient provided crutches.  Analgesics provided as an outpatient.  Close follow-up with orthopedic surgery.      Diagnosis    ICD-10-CM    1. Closed fracture of proximal end of left fibula, unspecified fracture morphology, initial encounter  S82.832A    2. Closed displaced avulsion fracture of right talus, initial encounter  S92.151A          Joselito Gomez MD Matthews, Jeremiah R, MD  02/13/21 1815

## 2021-02-14 NOTE — DISCHARGE INSTRUCTIONS
You can bear weight on your legs as tolerated.  Discharge Instructions  Extremity Injury    You were seen today for an injury to an extremity (arm, hand, leg, or foot). You may have a bruise, strain, or fracture (broken bone).    Generally, every Emergency Department visit should have a follow-up clinic visit with either a primary or a specialty clinic/provider. Please follow-up as instructed by your emergency provider today.  Return to the Emergency Department right away if:  Your pain seems to change or get worse or there is pain in a new area that wasn t evaluated today.  Your extremity becomes pale, cool, blue, or numb or tingling past the injury.  You have more drainage, redness or pain in the area of the cut or abrasion.  You have pain that you cannot control with the medicine recommended or prescribed here, or you have pain that seems too much for your injury.  Your child (who is injured) will not stop crying or is much more fussy than normal.  You have new symptoms or anything that worries you.    What to Expect:  Your swelling and pain may be worse the day after your injury, but should not be severe and should start getting better after that. You should not have new symptoms and your pain should not get worse.  You may start to get a bruise over the injured area or below the injured area (bruising can follow gravity).  Your movement and strength should get better with time.  Some injuries may not show up until after you have left the Emergency Department so it is important to follow-up as directed.  Your injury may prevent you from working.  Follow-up with your regular provider to get a work release note.  Pain medications or your injury may make it unsafe to drive or operate machinery.    Home Care:  RICE: Rest, Ice, Compression, Elevation  Rest: Rest your injured area for at least 1-2 days. After that you may start using your extremity again as long as there is not too much pain.   Ice: Apply ice your  injured area for 15 minutes at a time, at least 3 times a day. Use a cloth between the ice bag and your skin to prevent frostbite. Do not sleep with an ice pack or heating pad on, since this can cause burns or skin injury.  Compression: You may use an elastic bandage (Ace  Wrap) if it makes you more comfortable. Wrap it just tight enough to provide light compression, like a new pair of socks feels. Loosen the bandage if you have swelling past the bandage.  Elevation: Raise the injured area above the level of your heart as much as possible in the first 1-2 days.    Use Tylenol  (acetaminophen), Motrin (ibuprofen), or Advil  (ibuprofen) for your pain unless you have an allergy or are told not to use these medications by your provider.  Take the medications as instructed on the package. Tylenol  (acetaminophen) is in many prescription medicines and non-prescription medicines--check all of your medicines to be sure you aren t taking more than 3000 mg per day.  Please follow any other instructions that were discussed with you by your provider.    Stretching/Exercises:  You may have been provided with instructions for stretching or exercises. If your injury was to your arm or shoulder and your provider put you in a sling or an immobilizer, it is important that you take off your immobilizer within 3 days and stretch/move your shoulder, unless your provider specifically tells you to not move your shoulder.  This is to prevent further injury such as a  frozen shoulder .     If you were given a prescription for medicine here today, be sure to read all of the information (including the package insert) that comes with your prescription.  This will include important information about the medicine, its side effects, and any warnings that you need to know about.  The pharmacist who fills the prescription can provide more information and answer questions you may have about the medicine.  If you have questions or concerns that the  pharmacist cannot address, please call or return to the Emergency Department.     Remember that you can always come back to the Emergency Department if you are not able to see your regular provider in the amount of time listed above, if you get any new symptoms, or if there is anything that worries you.

## 2021-02-16 ENCOUNTER — TRANSFERRED RECORDS (OUTPATIENT)
Dept: HEALTH INFORMATION MANAGEMENT | Facility: CLINIC | Age: 61
End: 2021-02-16

## 2021-02-25 ENCOUNTER — NURSE TRIAGE (OUTPATIENT)
Dept: FAMILY MEDICINE | Facility: CLINIC | Age: 61
End: 2021-02-25

## 2021-02-25 NOTE — TELEPHONE ENCOUNTER
"Pt calling to reports that she is having pain in back of claf on left leg. Pt noted she is unsure if r/t recent injury or not. Pt stated she did a lot of walking yesterday, has a stabilizer boot on right. Pt stated also she recently traveled, 3 hour plane ride to [place]. Pt noted she did take an aspirin prior to travel.   Pt denies redness or warmth to the area. Does promote swelling, though noted that she does have injury on that leg as well unable to determine if related.    Writer advised need to be seen as she does have risk factors to DVT. Patient stated an understanding and agreed with plan.  Pt stated she is in [place] still and will proceed to ER out there.      Answer Assessment - Initial Assessment Questions  1. ONSET: \"When did the pain start?\"       Yesterday after walking  2. LOCATION: \"Where is the pain located?\"       Back of leg 3 in down from knee  3. PAIN: \"How bad is the pain?\"    (Scale 1-10; or mild, moderate, severe)    -  MILD (1-3): doesn't interfere with normal activities     -  MODERATE (4-7): interferes with normal activities (e.g., work or school) or awakens from sleep, limping     -  SEVERE (8-10): excruciating pain, unable to do any normal activities, unable to walk      mild  4. WORK OR EXERCISE: \"Has there been any recent work or exercise that involved this part of the body?\"       Yes walking more, also injury to leg, also plane ride.  5. CAUSE: \"What do you think is causing the leg pain?\"      Unknown, maybe DVT  6. OTHER SYMPTOMS: \"Do you have any other symptoms?\" (e.g., chest pain, back pain, breathing difficulty, swelling, rash, fever, numbness, weakness)      none  7. PREGNANCY: \"Is there any chance you are pregnant?\" \"When was your last menstrual period?\"      no    Protocols used: LEG PAIN-A-OH    NAFISA Moore [facility] Triage      "

## 2021-03-16 ENCOUNTER — TRANSFERRED RECORDS (OUTPATIENT)
Dept: HEALTH INFORMATION MANAGEMENT | Facility: CLINIC | Age: 61
End: 2021-03-16

## 2021-03-26 ENCOUNTER — NURSE TRIAGE (OUTPATIENT)
Dept: NURSING | Facility: CLINIC | Age: 61
End: 2021-03-26

## 2021-03-26 DIAGNOSIS — U07.1 2019 NOVEL CORONAVIRUS DISEASE (COVID-19): Primary | ICD-10-CM

## 2021-03-26 NOTE — TELEPHONE ENCOUNTER
"Alfonzo is requesting a covid antibody test.     Patient is calling requesting COVID serologic antibody testing.  NOTE: Serologic testing is a blood test for 'antibodies' which are made at 10-14 days after you have had symptoms of COVID or were exposed and had an asymptomatic infection.  This does NOT test you for 'active' infection or tell you if you are contagious.    Are you a healthcare worker? no  Do you currently have a cough, fever, body aches, shortness of breath, or difficulty breathing?  No  Did you previously have cough, fever, body aches, shortness of breath, or difficulty breathing that have now resolved? Has had previous covid symptoms.   Symptoms began 40 days ago.  Symptoms started > 14 days ago. Lab order placed per SARS-CoV-2 Serology test Standing Order using indication \"Previously symptomatic >14d since onset, currently asymptomatic\" and diagnosis code \"Screening for viral disease\" (Z11.59)      The patient was informed: \"Testing is limited each day and it may take time for testing to be available to everyone who has called. You will receive a call within 48-72 hours to schedule the serology testing. Please confirm the best number to reach you is [phone]. If you have any questions about scheduling, call [phone].\"         Reason for Disposition    COVID-19 Testing, questions about    Additional Information    Negative: SEVERE difficulty breathing (e.g., struggling for each breath, speaks in single words)    Negative: Difficult to awaken or acting confused (e.g., disoriented, slurred speech)    Negative: Bluish (or gray) lips or face now    Negative: Shock suspected (e.g., cold/pale/clammy skin, too weak to stand, low BP, rapid pulse)    Negative: Sounds like a life-threatening emergency to the triager    Negative: SEVERE or constant chest pain or pressure (Exception: mild central chest pain, present only when coughing)    Negative: MODERATE difficulty breathing (e.g., speaks in phrases, " SOB even at rest, pulse 100-120)    Negative: [1] Headache AND [2] stiff neck (can't touch chin to chest)    Negative: MILD difficulty breathing (e.g., minimal/no SOB at rest, SOB with walking, pulse <100)    Negative: Chest pain or pressure    Negative: Patient sounds very sick or weak to the triager    Negative: Fever > 103 F (39.4 C)    Negative: [1] Fever > 101 F (38.3 C) AND [2] age > 60    Negative: [1] Fever > 100.0 F (37.8 C) AND [2] bedridden (e.g., nursing home patient, CVA, chronic illness, recovering from surgery)    Negative: [1] HIGH RISK patient (e.g., age > 64 years, diabetes, heart or lung disease, weak immune system) AND [2] new or worsening symptoms    Negative: [1] HIGH RISK patient AND [2] influenza is widespread in the community AND [3] ONE OR MORE respiratory symptoms: cough, sore throat, runny or stuffy nose    Negative: [1] HIGH RISK patient AND [2] influenza exposure within the last 7 days AND [3] ONE OR MORE respiratory symptoms: cough, sore throat, runny or stuffy nose    Negative: Fever present > 3 days (72 hours)    Negative: [1] Fever returns after gone for over 24 hours AND [2] symptoms worse or not improved    Negative: [1] Continuous (nonstop) coughing interferes with work or school AND [2] no improvement using cough treatment per protocol    Negative: [1] COVID-19 infection suspected by caller or triager AND [2] mild symptoms (cough, fever, or others) AND [3] no complications or SOB    Negative: Cough present > 3 weeks    Negative: [1] COVID-19 diagnosed by positive lab test AND [2] mild symptoms (e.g., cough, fever, others) AND [3] no complications or SOB    Negative: [1] COVID-19 diagnosed by HCP (doctor, NP or PA) AND [2] mild symptoms (e.g., cough, fever, others) AND [3] no complications or SOB    Negative: COVID-19 Home Isolation, questions about    Protocols used: CORONAVIRUS (COVID-19) DIAGNOSED OR [id number] 1.3

## 2021-03-30 ENCOUNTER — OFFICE VISIT (OUTPATIENT)
Dept: FAMILY MEDICINE | Facility: CLINIC | Age: 61
End: 2021-03-30
Payer: COMMERCIAL

## 2021-03-30 VITALS
BODY MASS INDEX: 24.92 KG/M2 | SYSTOLIC BLOOD PRESSURE: 108 MMHG | HEART RATE: 84 BPM | DIASTOLIC BLOOD PRESSURE: 66 MMHG | TEMPERATURE: 97.4 F | WEIGHT: 178 LBS | HEIGHT: 71 IN | OXYGEN SATURATION: 99 %

## 2021-03-30 DIAGNOSIS — Z13.220 LIPID SCREENING: ICD-10-CM

## 2021-03-30 DIAGNOSIS — I70.0 AORTIC CALCIFICATION (H): ICD-10-CM

## 2021-03-30 DIAGNOSIS — Z13.29 SCREENING FOR THYROID DISORDER: ICD-10-CM

## 2021-03-30 DIAGNOSIS — U07.1 2019 NOVEL CORONAVIRUS DISEASE (COVID-19): ICD-10-CM

## 2021-03-30 DIAGNOSIS — Z01.818 PREOPERATIVE EXAMINATION: Primary | ICD-10-CM

## 2021-03-30 DIAGNOSIS — Z13.1 SCREENING FOR DIABETES MELLITUS: ICD-10-CM

## 2021-03-30 DIAGNOSIS — H26.9 CATARACT OF BOTH EYES, UNSPECIFIED CATARACT TYPE: ICD-10-CM

## 2021-03-30 LAB
ALBUMIN SERPL-MCNC: 4.1 G/DL (ref 3.4–5)
ALP SERPL-CCNC: 96 U/L (ref 40–150)
ALT SERPL W P-5'-P-CCNC: 42 U/L (ref 0–50)
ANION GAP SERPL CALCULATED.3IONS-SCNC: 8 MMOL/L (ref 3–14)
AST SERPL W P-5'-P-CCNC: 24 U/L (ref 0–45)
BILIRUB SERPL-MCNC: 0.7 MG/DL (ref 0.2–1.3)
BUN SERPL-MCNC: 16 MG/DL (ref 7–30)
CALCIUM SERPL-MCNC: 9.3 MG/DL (ref 8.5–10.1)
CHLORIDE SERPL-SCNC: 104 MMOL/L (ref 94–109)
CHOLEST SERPL-MCNC: 207 MG/DL
CO2 SERPL-SCNC: 28 MMOL/L (ref 20–32)
CREAT SERPL-MCNC: 0.73 MG/DL (ref 0.52–1.04)
ERYTHROCYTE [DISTWIDTH] IN BLOOD BY AUTOMATED COUNT: 12.2 % (ref 10–15)
GFR SERPL CREATININE-BSD FRML MDRD: 88 ML/MIN/{1.73_M2}
GLUCOSE SERPL-MCNC: 86 MG/DL (ref 70–99)
HCT VFR BLD AUTO: 41.7 % (ref 35–47)
HDLC SERPL-MCNC: 81 MG/DL
HGB BLD-MCNC: 13.8 G/DL (ref 11.7–15.7)
LDLC SERPL CALC-MCNC: 113 MG/DL
MCH RBC QN AUTO: 31.4 PG (ref 26.5–33)
MCHC RBC AUTO-ENTMCNC: 33.1 G/DL (ref 31.5–36.5)
MCV RBC AUTO: 95 FL (ref 78–100)
NONHDLC SERPL-MCNC: 126 MG/DL
PLATELET # BLD AUTO: 234 10E9/L (ref 150–450)
POTASSIUM SERPL-SCNC: 3.8 MMOL/L (ref 3.4–5.3)
PROT SERPL-MCNC: 7.3 G/DL (ref 6.8–8.8)
RBC # BLD AUTO: 4.4 10E12/L (ref 3.8–5.2)
SODIUM SERPL-SCNC: 140 MMOL/L (ref 133–144)
TRIGL SERPL-MCNC: 67 MG/DL
TSH SERPL DL<=0.005 MIU/L-ACNC: 1.86 MU/L (ref 0.4–4)
WBC # BLD AUTO: 5 10E9/L (ref 4–11)

## 2021-03-30 PROCEDURE — 86769 SARS-COV-2 COVID-19 ANTIBODY: CPT | Performed by: FAMILY MEDICINE

## 2021-03-30 PROCEDURE — 80053 COMPREHEN METABOLIC PANEL: CPT | Performed by: FAMILY MEDICINE

## 2021-03-30 PROCEDURE — 99214 OFFICE O/P EST MOD 30 MIN: CPT | Performed by: NURSE PRACTITIONER

## 2021-03-30 PROCEDURE — 93000 ELECTROCARDIOGRAM COMPLETE: CPT | Performed by: NURSE PRACTITIONER

## 2021-03-30 PROCEDURE — 85027 COMPLETE CBC AUTOMATED: CPT | Performed by: FAMILY MEDICINE

## 2021-03-30 PROCEDURE — 84443 ASSAY THYROID STIM HORMONE: CPT | Performed by: FAMILY MEDICINE

## 2021-03-30 PROCEDURE — 36415 COLL VENOUS BLD VENIPUNCTURE: CPT | Performed by: FAMILY MEDICINE

## 2021-03-30 PROCEDURE — 80061 LIPID PANEL: CPT | Performed by: FAMILY MEDICINE

## 2021-03-30 ASSESSMENT — MIFFLIN-ST. JEOR: SCORE: 1463.53

## 2021-03-30 NOTE — PROGRESS NOTES
[address] [address]  Phone: [phone]  Primary Provider: Kristal Peña    Pre-op Performing Provider: NELLY NICOLE      PREOPERATIVE EVALUATION:  Today's date: 3/30/2021    Alfonzo Cho is a 60 year old female who presents for a preoperative evaluation.    Surgical Information:  Surgery/Procedure: Cataracts  Surgery Location: [facility]   Surgeon: Dr. Hunt  Surgery Date: 04/14/21  Time of Surgery: TBD  Where patient plans to recover: At home with family  Fax number for surgical facility: [phone]    Type of Anesthesia Anticipated: Mac anesthesia     Assessment & Plan     The proposed surgical procedure is considered LOW risk.    Alfonzo was seen today for pre-op exam.    Diagnoses and all orders for this visit:    Preoperative examination  Cataract of both eyes, unspecified cataract type  -     EKG 12-lead complete w/read - Clinics  -     CBC with platelets      Aortic calcification (H)  Previously identified aortic calcification seen on thoracic x-ray in 2016.   Subsequent echocardiogram completed on 5/6/2016.    Interpretation Summary  Left ventricular systolic function is normal.  The visual ejection fraction is estimated at 60-65%.  There is mild (1+) aortic regurgitation.  The study was technically adequate. There is no comparison study available.  Plan further consultation with cardiology regarding previous aortic calcification.   This done not need to be done prior to procedure.    -     CARDIOLOGY EVAL ADULT REFERRAL; Future    2019 novel coronavirus disease (COVID-19)  -     COVID-19 Virus (Coronavirus) Antibody & Titer Reflex    Lipid screening  -     Lipid panel reflex to direct LDL Fasting    Screening for thyroid disorder  -     TSH with free T4 reflex    Screening for diabetes mellitus  -     Comprehensive metabolic panel (BMP + Alb, Alk Phos, ALT, AST, Total. Bili, TP)    Other orders  -      REVIEW OF HEALTH MAINTENANCE PROTOCOL ORDERS        Risks and Recommendations:  The patient has the following additional risks and recommendations for perioperative complications:   - No identified additional risk factors other than previously addressed    Medication Instructions:  Patient is on no chronic medications    RECOMMENDATION:  APPROVAL GIVEN to proceed with proposed procedure, without further diagnostic evaluation.            Subjective     HPI related to upcoming procedure:     Planning bilateral cataract removal 1 week apart.    History of vision changes, which have worsened in the past 6 months.        Preop Questions 3/27/2021   1. Have you ever had a heart attack or stroke? No   2. Have you ever had surgery on your heart or blood vessels, such as a stent placement, a coronary artery bypass, or surgery on an artery in your head, neck, heart, or legs? No   3. Do you have chest pain with activity? No   4. Do you have a history of  heart failure? No   5. Do you currently have a cold, bronchitis or symptoms of other infection? No   6. Do you have a cough, shortness of breath, or wheezing? No   7. Do you or anyone in your family have previous history of blood clots? No   8. Do you or does anyone in your family have a serious bleeding problem such as prolonged bleeding following surgeries or cuts? No   9. Have you ever had problems with anemia or been told to take iron pills? No   10. Have you had any abnormal blood loss such as black, tarry or bloody stools, or abnormal vaginal bleeding? No   11. Have you ever had a blood transfusion? No   12. Are you willing to have a blood transfusion if it is medically needed before, during, or after your surgery? Yes   13. Have you or any of your relatives ever had problems with anesthesia? No   14. Do you have sleep apnea, excessive snoring or daytime drowsiness? YES - MIGUEL, using CPAP on a consistent basis   14a. Do you have a CPAP machine? Yes   15. Do you have any  artifical heart valves or other implanted medical devices like a pacemaker, defibrillator, or continuous glucose monitor? No   16. Do you have artificial joints? No   17. Are you allergic to latex? No       Health Care Directive:  Patient does not have a Health Care Directive or Living Will: Patient states has Advance Directive and will bring in a copy to clinic.    Preoperative Review of :   reviewed - no record of controlled substances prescribed.      Status of Chronic Conditions:  See problem list for active medical problems.  Problems all longstanding and stable, except as noted/documented.  See ROS for pertinent symptoms related to these conditions.      Review of Systems  CONSTITUTIONAL: NEGATIVE for fever, chills, change in weight  INTEGUMENTARY/SKIN: NEGATIVE for worrisome rashes, moles or lesions  EYES: NEGATIVE for vision changes or irritation  ENT/MOUTH: NEGATIVE for ear, mouth and throat problems, POSITIVE for seasonal allergies  RESP: NEGATIVE for significant cough or SOB  BREAST: NEGATIVE for masses, tenderness or discharge  CV: NEGATIVE for chest pain, palpitations or peripheral edema  GI: NEGATIVE for nausea, abdominal pain, heartburn, or change in bowel habits  : NEGATIVE for frequency, dysuria, or hematuria  MUSCULOSKELETAL: NEGATIVE for significant arthralgias or myalgia, POSITIVE for left knee pain  NEURO: NEGATIVE for weakness, dizziness or paresthesias  ENDOCRINE: NEGATIVE for temperature intolerance, skin/hair changes  HEME: NEGATIVE for bleeding problems  PSYCHIATRIC: NEGATIVE for changes in mood or affect    Patient Active Problem List    Diagnosis Date Noted     Inadequate sleep hygiene 11/27/2019     Priority: Medium     BMI 28.0-28.9,adult 09/05/2017     Priority: Medium     Gastroesophageal reflux disease, esophagitis presence not specified 09/05/2017     Priority: Medium     IMO Regulatory Load OCT 2020       Asymptomatic varicose veins      Priority: Medium     Nonrheumatic  aortic valve insufficiency - mild - no symptoms.  05/16/2016     Priority: Medium     Noted on echo 05/06/2016.        Aortic calcification (H) 04/22/2016     Priority: Medium     Advanced directives, counseling/discussion 11/10/2015     Priority: Medium     Advance Care Planning 11/10/2015: ACP Review and Resources Provided:  Reviewed chart for advance care plan.  Alfonzo Cho has no plan or code status on file. Discussed available resources and provided with information..  Confirmed/documented legally designated decision maker(s). Added by Leeann Kurtz                 Memory difficulties- some short-term, worse at work 11/06/2015     Priority: Medium     Low blood pressure- usual - asymptomatic- 90/60 = baseline 12/04/2013     Priority: Medium     Family history of hypothyroidism- father 12/04/2013     Priority: Medium     Infrapatellar bursitis 09/27/2013     Priority: Medium     Plantar fasciitis 10/07/2010     Priority: Medium     Seborrheic dermatitis 10/07/2010     Priority: Medium     history of Thrombophlebitis leg (H) 03/22/2010     Priority: Medium     Sleep apnea      Priority: Medium     now on CPAP        Seasonal allergic rhinitis 06/09/2008     Priority: Medium     Symptomatic menopausal or female climacteric states 06/09/2008     Priority: Medium     Pain in joint, pelvic region and thigh 12/03/2007     Priority: Medium      Past Medical History:   Diagnosis Date     Aortic calcification (H) 4/22/2016     Asymptomatic varicose veins      Contact dermatitis and other eczema, due to unspecified cause     seasonally     Infrapatellar bursitis      Nonrheumatic aortic valve insufficiency - mild - no symptoms.     incidental finding on thoracic XR     Papanicolaou smear of cervix with atypical squamous cells of undetermined significance (ASC-US) 07/01/16    Neg HR HPV     Phlebitis and thrombophlebitis of femoral vein (deep) (superficial) (H) not candidate for HRT     popliteal after progesterone  oral therapy - no other clots      Sleep apnea     now on CPAP      Past Surgical History:   Procedure Laterality Date      SECTION      S/P CSECT X 3     ENDOSCOPIC STRIPPING VEIN(S)  92,02    S/P VEIN STRIPPING      HC COLONOSCOPY THRU STOMA, DIAGNOSTIC  2009     [place] [facility] - normal - repeat in 2019      TUBAL LIGATION  1995    with 3rd      Current Outpatient Medications   Medication Sig Dispense Refill     cholecalciferol (VITAMIN D3) 25 mcg (1000 units) capsule        magnesium 250 MG tablet        omega-3 acid ethyl esters (LOVAZA) 1 g capsule Take 1 g by mouth       psyllium (METAMUCIL/KONSYL) capsule          Allergies   Allergen Reactions     Penicillins      IV penicillin- Hives, edema hands and feet        Social History     Tobacco Use     Smoking status: Former Smoker     Packs/day: 0.00     Years: 2.00     Pack years: 0.00     Types: Cigarettes     Start date: 1978     Quit date: 1980     Years since quittin.2     Smokeless tobacco: Never Used     Tobacco comment: only smoked a couple of years   Substance Use Topics     Alcohol use: Yes     Alcohol/week: 0.0 standard drinks     Comment: 2-4 drinks/month     Family History   Problem Relation Age of Onset     Neurologic Disorder Father         ALS     Hypothyroidism Father      Glaucoma Father         glaucoma     Thyroid Disease Father      Osteoporosis Mother         on Fosamax      Uterine Cancer Mother 78        endometrial cancer s/p hysterectomy at age 80 - still alive      Esophageal Cancer Maternal Uncle      Esophageal Cancer Maternal Uncle      Hearing Loss Brother      No Known Problems Son      No Known Problems Daughter      No Known Problems Son      Hearing Loss Brother      Hearing Loss Brother      C.A.D. Maternal Grandfather          age 62     Diabetes Paternal Uncle      Cerebrovascular Disease Maternal Grandmother          age 70      No Known Problems Paternal Grandmother      No Known Problems  "Paternal Grandfather      History   Drug Use No     Comment: no herbal meds either          Objective     /66   Pulse 84   Temp 97.4  F (36.3  C)   Ht 1.803 m (5' 11\")   Wt 80.7 kg (178 lb)   LMP 05/01/2009   SpO2 99%   BMI 24.83 kg/m      Physical Exam    GENERAL APPEARANCE: healthy, alert and no distress     EYES: EOMI, PERRL     HENT: ear canals and TM's normal     NECK: no adenopathy, no asymmetry, masses, or scars and thyroid normal to palpation     RESP: lungs clear to auscultation - no rales, rhonchi or wheezes     CV: regular rates and rhythm, normal S1 S2, no S3 or S4 and no murmur, click or rub     ABDOMEN:  soft, nontender, no HSM or masses and bowel sounds normal     MS: extremities normal- no gross deformities noted, no evidence of inflammation in joints     SKIN: no suspicious lesions or rashes     NEURO: Normal strength and tone, sensory exam grossly normal, mentation intact and speech normal     PSYCH: mentation appears normal. and affect normal/bright     LYMPHATICS: No cervical adenopathy    Recent Labs   Lab Test 10/22/19  0938   HGB 13.6         POTASSIUM 3.8   CR 0.71        Diagnostics:  Lab Results   Component Value Date    WBC 5.0 03/30/2021     Lab Results   Component Value Date    RBC 4.40 03/30/2021     Lab Results   Component Value Date    HGB 13.8 03/30/2021     Lab Results   Component Value Date    HCT 41.7 03/30/2021     No components found for: MCT  Lab Results   Component Value Date    MCV 95 03/30/2021     Lab Results   Component Value Date    MCH 31.4 03/30/2021     Lab Results   Component Value Date    MCHC 33.1 03/30/2021     Lab Results   Component Value Date    RDW 12.2 03/30/2021     Lab Results   Component Value Date     03/30/2021       EKG: appears normal, NSR, normal axis, normal intervals, no acute ST/T changes c/w ischemia, no LVH by voltage criteria, unchanged from previous tracings    Revised Cardiac Risk Index (RCRI):  The patient has " the following serious cardiovascular risks for perioperative complications:   - No serious cardiac risks = 0 points     RCRI Interpretation: 0 points: Class I (very low risk - 0.4% complication rate)           Signed Electronically by: LORENZO Dooley CNP  Copy of this evaluation report is provided to requesting physician.

## 2021-03-30 NOTE — RESULT ENCOUNTER NOTE
Dear Alfonzo,     -Normal red blood cell (hgb) levels, normal white blood cell count and normal platelet levels.      Please send a Rank By Search message or call [phone]  if you have any questions.      LORENZO Dooley, [facility] [place]    If you have further questions about the interpretation of your labs, [email or web address] is a good website to check out for further information.

## 2021-03-31 LAB
SARS-COV-2 AB PNL SERPL IA: NEGATIVE
SARS-COV-2 IGG SERPL IA-ACNC: NORMAL

## 2021-03-31 NOTE — RESULT ENCOUNTER NOTE
Dear Alfonzo,     -LDL(bad) cholesterol level is elevated which can increase your heart disease risk.  A diet high in fat and simple carbohydrates, genetics and being overweight can contribute to this. ADVISE: exercising 150 minutes of aerobic exercise per week (30 minutes for 5 days per week or 50 minutes for 3 days per week are options) and eating a low saturated fat/low carbohydrate diet are helpful to improve this. In 12 months, you should recheck your fasting cholesterol panel.     -Liver and gallbladder tests are normal (ALT,AST, Alk phos, bilirubin), kidney function is normal (Cr, GFR), sodium is normal, potassium is normal, calcium is normal, glucose is normal.    -TSH (thyroid stimulating hormone) level is normal which indicates normal thyroid function.      Please send a VISENZE message or call [phone]  if you have any questions.      Alba Iraheta, LORENZO, CNP  [facility] [place]    If you have further questions about the interpretation of your labs, [email or web address] is a good website to check out for further information.

## 2021-04-07 ENCOUNTER — OFFICE VISIT (OUTPATIENT)
Dept: FAMILY MEDICINE | Facility: CLINIC | Age: 61
End: 2021-04-07
Payer: COMMERCIAL

## 2021-04-07 VITALS
SYSTOLIC BLOOD PRESSURE: 98 MMHG | WEIGHT: 178 LBS | TEMPERATURE: 97.6 F | BODY MASS INDEX: 24.92 KG/M2 | DIASTOLIC BLOOD PRESSURE: 70 MMHG | OXYGEN SATURATION: 99 % | HEART RATE: 90 BPM | HEIGHT: 71 IN

## 2021-04-07 DIAGNOSIS — Z12.11 SCREEN FOR COLON CANCER: ICD-10-CM

## 2021-04-07 DIAGNOSIS — Z12.39 ENCOUNTER FOR SCREENING FOR MALIGNANT NEOPLASM OF BREAST, UNSPECIFIED SCREENING MODALITY: ICD-10-CM

## 2021-04-07 DIAGNOSIS — N95.2 VAGINAL ATROPHY: ICD-10-CM

## 2021-04-07 DIAGNOSIS — Z00.00 ROUTINE GENERAL MEDICAL EXAMINATION AT A HEALTH CARE FACILITY: Primary | ICD-10-CM

## 2021-04-07 PROCEDURE — 99396 PREV VISIT EST AGE 40-64: CPT | Performed by: NURSE PRACTITIONER

## 2021-04-07 RX ORDER — ESTRADIOL 0.1 MG/G
2 CREAM VAGINAL
Qty: 42.5 G | Refills: 2 | Status: SHIPPED | OUTPATIENT
Start: 2021-04-08 | End: 2022-10-06

## 2021-04-07 ASSESSMENT — MIFFLIN-ST. JEOR: SCORE: 1463.53

## 2021-04-07 NOTE — PROGRESS NOTES
SUBJECTIVE:   CC: Alfonzo Cho is an 60 year old woman who presents for preventive health visit.       Patient has been advised of split billing requirements and indicates understanding: Yes  Healthy Habits:     Getting at least 3 servings of Calcium per day:  Yes    Bi-annual eye exam:  Yes    Dental care twice a year:  Yes    Sleep apnea or symptoms of sleep apnea:  Sleep apnea    Diet:  Regular (no restrictions)    Frequency of exercise:  None    Taking medications regularly:  Yes    Medication side effects:  Not applicable    PHQ-2 Total Score: 0    Additional concerns today:  Yes      Vaginal atrophy.    Went through menopause in .    Has struggled with vaginal dryness.    Bleeding after intercourse.  Discomfort and difficulty with intercourse due to dryness.       Recent Labs   Lab Test 21  1036 10/22/19  0938 11/06/15  0954 11/06/15  0954 13  1027   CHOL 207* 184   < > 205* 192   HDL 81 63   < > 80 75   * 108*   < > 111 104   TRIG 67 66   < > 70 70   CHOLHDLRATIO  --   --   --  2.6 2.6    < > = values in this interval not displayed.       Today's PHQ-2 Score:   PHQ-2 (  [employer]) 2021   Q1: Little interest or pleasure in doing things 0   Q2: Feeling down, depressed or hopeless 0   PHQ-2 Score 0   Q1: Little interest or pleasure in doing things Not at all   Q2: Feeling down, depressed or hopeless Not at all   PHQ-2 Score 0       Abuse: Current or Past (Physical, Sexual or Emotional) - No  Do you feel safe in your environment? Yes    Have you ever done Advance Care Planning? (For example, a Health Directive, POLST, or a discussion with a medical provider or your loved ones about your wishes): Yes, advance care planning is on file.    Social History     Tobacco Use     Smoking status: Former Smoker     Packs/day: 0.00     Years: 2.00     Pack years: 0.00     Types: Cigarettes     Start date: 1978     Quit date: 1980     Years since quittin.2     Smokeless tobacco:  Never Used     Tobacco comment: only smoked a couple of years   Substance Use Topics     Alcohol use: Yes     Alcohol/week: 0.0 standard drinks     Comment: 2-4 drinks/month     If you drink alcohol do you typically have >3 drinks per day or >7 drinks per week? No    Alcohol Use 2021   Prescreen: >3 drinks/day or >7 drinks/week? No   Prescreen: >3 drinks/day or >7 drinks/week? -       Reviewed orders with patient.  Reviewed health maintenance and updated orders accordingly - Yes  BP Readings from Last 3 Encounters:   21 98/70   21 108/66   21 117/69    Wt Readings from Last 3 Encounters:   21 80.7 kg (178 lb)   21 80.7 kg (178 lb)   21 80.7 kg (178 lb)                  Patient Active Problem List   Diagnosis     Pain in joint, pelvic region and thigh     Seasonal allergic rhinitis     Symptomatic menopausal or female climacteric states     Sleep apnea     history of Thrombophlebitis leg (H)     Plantar fasciitis     Seborrheic dermatitis     Infrapatellar bursitis     Low blood pressure- usual - asymptomatic- 90/60 = baseline     Family history of hypothyroidism- father     Memory difficulties- some short-term, worse at work     Advanced directives, counseling/discussion     Aortic calcification (H)     Nonrheumatic aortic valve insufficiency - mild - no symptoms.      BMI 28.0-28.9,adult     Asymptomatic varicose veins     Gastroesophageal reflux disease, esophagitis presence not specified     Inadequate sleep hygiene     Past Surgical History:   Procedure Laterality Date      SECTION      S/P CSECT X 3     ENDOSCOPIC STRIPPING VEIN(S)  92,02    S/P VEIN STRIPPING      HC COLONOSCOPY THRU STOMA, DIAGNOSTIC  2009     [place] [facility] - normal - repeat in [place]      TUBAL LIGATION  1995    with 3rd        Social History     Tobacco Use     Smoking status: Former Smoker     Packs/day: 0.00     Years: 2.00     Pack years: 0.00     Types: Cigarettes     Start date:  1978     Quit date: 1980     Years since quittin.2     Smokeless tobacco: Never Used     Tobacco comment: only smoked a couple of years   Substance Use Topics     Alcohol use: Yes     Alcohol/week: 0.0 standard drinks     Comment: 2-4 drinks/month     Family History   Problem Relation Age of Onset     Neurologic Disorder Father         ALS     Hypothyroidism Father      Glaucoma Father         glaucoma     Thyroid Disease Father      Osteoporosis Mother         on Fosamax      Uterine Cancer Mother 78        endometrial cancer s/p hysterectomy at age 80 - still alive      Esophageal Cancer Maternal Uncle      Esophageal Cancer Maternal Uncle      Hearing Loss Brother      No Known Problems Son      No Known Problems Daughter      No Known Problems Son      Hearing Loss Brother      Hearing Loss Brother      C.A.D. Maternal Grandfather          age 62     Diabetes Paternal Uncle      Cerebrovascular Disease Maternal Grandmother          age 70      No Known Problems Paternal Grandmother      No Known Problems Paternal Grandfather          Current Outpatient Medications   Medication Sig Dispense Refill     cholecalciferol (VITAMIN D3) 25 mcg (1000 units) capsule        [START ON 2021] estradiol (ESTRACE) 0.1 MG/GM vaginal cream Place 2 g vaginally twice a week 42.5 g 2     magnesium 250 MG tablet        omega-3 acid ethyl esters (LOVAZA) 1 g capsule Take 1 g by mouth       psyllium (METAMUCIL/KONSYL) capsule          Breast Cancer Screening:    Breast CA Risk Assessment (FHS-7) 2021   Do you have a family history of breast, colon, or ovarian cancer? No / Unknown     Mother with endometrial cancer - diagnosed at age 78.     Colonoscopy - was set up for 2019.  2 days of prep and still wasn't fully prepped.  Decided not to do 3 days of prep.  Would like to do FIT testing.        Mammogram Screening: Recommended mammography every 1-2 years with patient discussion and risk factor  consideration  Pertinent mammograms are reviewed under the imaging tab.    History of abnormal Pap smear: NO - age 30-65 PAP every 5 years with negative HPV co-testing recommended  PAP / HPV Latest Ref Rng & Units 10/22/2019 2016 2013   PAP - NIL ASC-US(A) NIL   HPV 16 DNA NEG:Negative Negative Negative -   HPV 18 DNA NEG:Negative Negative Negative -   OTHER HR HPV NEG:Negative Negative Negative -     Reviewed and updated as needed this visit by clinical staff  Tobacco  Allergies  Meds   Med Hx  Surg Hx  Fam Hx  Soc Hx        Reviewed and updated as needed this visit by Provider    Meds             Past Medical History:   Diagnosis Date     Aortic calcification (H) 2016     Asymptomatic varicose veins      Contact dermatitis and other eczema, due to unspecified cause     seasonally     Infrapatellar bursitis      Nonrheumatic aortic valve insufficiency - mild - no symptoms.     incidental finding on thoracic XR     Papanicolaou smear of cervix with atypical squamous cells of undetermined significance (ASC-US) 16    Neg HR HPV     Phlebitis and thrombophlebitis of femoral vein (deep) (superficial) (H) not candidate for HRT     popliteal after progesterone oral therapy - no other clots      Sleep apnea     now on CPAP       Past Surgical History:   Procedure Laterality Date      SECTION      S/P CSECT X 3     ENDOSCOPIC STRIPPING VEIN(S)  92,02    S/P VEIN STRIPPING      HC COLONOSCOPY THRU STOMA, DIAGNOSTIC  2009     [place] [facility] [place] - repeat in [place]      TUBAL LIGATION  1995    with 3rd        Review of Systems  CONSTITUTIONAL: NEGATIVE for fever, chills, change in weight  INTEGUMENTARY/SKIN: NEGATIVE for worrisome rashes, moles or lesions  EYES: NEGATIVE for vision changes or irritation  ENT: NEGATIVE for ear, mouth and throat problems  RESP: NEGATIVE for significant cough or SOB  BREAST: NEGATIVE for masses, tenderness or discharge  CV: NEGATIVE for chest pain,  "palpitations or peripheral edema  GI: NEGATIVE for nausea, abdominal pain, heartburn, or change in bowel habits  : NEGATIVE for unusual urinary or vaginal symptoms. No vaginal bleeding.  MUSCULOSKELETAL: NEGATIVE for significant arthralgias or myalgia  NEURO: NEGATIVE for weakness, dizziness or paresthesias  PSYCHIATRIC: NEGATIVE for changes in mood or affect      OBJECTIVE:   BP 98/70   Pulse 90   Temp 97.6  F (36.4  C) (Tympanic)   Ht 1.803 m (5' 11\")   Wt 80.7 kg (178 lb)   LMP 05/01/2009   SpO2 99%   BMI 24.83 kg/m    Physical Exam  GENERAL: healthy, alert and no distress  EYES: Eyes grossly normal to inspection, PERRL and conjunctivae and sclerae normal  HENT: ear canals and TM's normal, nose and mouth without ulcers or lesions  NECK: no adenopathy, no asymmetry, masses, or scars and thyroid normal to palpation  RESP: lungs clear to auscultation - no rales, rhonchi or wheezes  CV: regular rate and rhythm, normal S1 S2, no S3 or S4, no murmur, click or rub, no peripheral edema  ABDOMEN: soft, nontender, no hepatosplenomegaly, no masses and bowel sounds normal  MS: no gross musculoskeletal defects noted, no edema  SKIN: no suspicious lesions or rashes  NEURO: Normal strength and tone, mentation intact and speech normal  PSYCH: mentation appears normal, affect normal/bright      ASSESSMENT/PLAN:     Alfonzo was seen today for physical.    Diagnoses and all orders for this visit:    Routine general medical examination at a health care facility    Screen for colon cancer  -     Fecal colorectal cancer screen (FIT); Future    Encounter for screening for malignant neoplasm of breast, unspecified screening modality  -     *MA Screening Digital Bilateral; Future    Vaginal atrophy  -     estradiol (ESTRACE) 0.1 MG/GM vaginal cream; Place 2 g vaginally twice a week  -     Recommended initiation of Replens vaginal moisture as well.          COUNSELING:  Reviewed preventive health counseling, as reflected in patient " "instructions       Regular exercise       Healthy diet/nutrition    Estimated body mass index is 24.83 kg/m  as calculated from the following:    Height as of this encounter: 1.803 m (5' 11\").    Weight as of this encounter: 80.7 kg (178 lb).        She reports that she quit smoking about 41 years ago. Her smoking use included cigarettes. She started smoking about 43 years ago. She smoked 0.00 packs per day for 2.00 years. She has never used smokeless tobacco.      Counseling Resources:  ATP IV Guidelines  Pooled Cohorts Equation Calculator  Breast Cancer Risk Calculator  BRCA-Related Cancer Risk Assessment: FHS-7 Tool  FRAX Risk Assessment  ICSI Preventive Guidelines  Dietary Guidelines for Americans, 2010  USDA's MyPlate  ASA Prophylaxis  Lung CA Screening    Alba Iraheta, LORENZO [facility]  "

## 2021-04-07 NOTE — PATIENT INSTRUCTIONS

## 2021-04-19 DIAGNOSIS — Z12.11 SCREEN FOR COLON CANCER: ICD-10-CM

## 2021-04-19 PROCEDURE — 82274 ASSAY TEST FOR BLOOD FECAL: CPT | Performed by: NURSE PRACTITIONER

## 2021-04-21 LAB — HEMOCCULT STL QL IA: NEGATIVE

## 2021-05-05 ENCOUNTER — OFFICE VISIT (OUTPATIENT)
Dept: CARDIOLOGY | Facility: CLINIC | Age: 61
End: 2021-05-05
Attending: NURSE PRACTITIONER
Payer: COMMERCIAL

## 2021-05-05 VITALS
SYSTOLIC BLOOD PRESSURE: 102 MMHG | BODY MASS INDEX: 25.34 KG/M2 | WEIGHT: 181 LBS | HEART RATE: 66 BPM | HEIGHT: 71 IN | OXYGEN SATURATION: 99 % | DIASTOLIC BLOOD PRESSURE: 60 MMHG

## 2021-05-05 DIAGNOSIS — I70.0 AORTIC CALCIFICATION (H): ICD-10-CM

## 2021-05-05 PROCEDURE — 99204 OFFICE O/P NEW MOD 45 MIN: CPT | Performed by: INTERNAL MEDICINE

## 2021-05-05 ASSESSMENT — MIFFLIN-ST. JEOR: SCORE: 1471.88

## 2021-05-05 NOTE — PROGRESS NOTES
Service Date: 2021    CARDIOLOGY CONSULTATION    PRIMARY CARE DOCTOR:  Dr. Kristal Peña    HISTORY OF PRESENT ILLNESS:  Alfonzo Cho, a 61-year-old woman, was evaluated in consultation at your request for aortic calcification noted on a 2016 chest x-ray.    Ms. Cho has no history of tobacco use, hypertension, dyslipidemia, previous MI, diabetes mellitus or a family history of premature coronary disease.  Her maternal grandfather  at age 65 from a myocardial infarction.    The patient underwent a chest x-ray in 2016 that demonstrated aortic calcification.  During a recent clinic visit, cardiology consultation was requested to determine whether further therapy or testing were needed to evaluate the patient's aortic calcification.    The patient underwent a bilateral duplex carotid ultrasound in 2017 that showed no atherosclerotic plaque in the carotid bifurcations.  Flows were normal with no evidence of significant  stenosis.  The patient specifically denies chest, arm, neck, jaw or back discomfort with exertion and/or focal neurologic deficit.  She does not have a routine exercise program but tries to walk as much as possible.  She admits she gained a little bit of weight during the COVID pandemic.    HABITS:  The patient does not abuse tobacco or alcohol.    SOCIAL HISTORY:  The patient is .  She has 3 children and 2 grandchildren.  She worked as a dental hygienist.    REVIEW OF SYSTEMS:  A 12-point review of systems was performed.  Outside the issues mentioned in HPI, there are no other complaints.    ALLERGIES:  Penicillin.    SURGICAL HISTORY:  Previous varicose vein surgery.    MEDICATIONS:    1.  Cholecalciferol.    2.  Estradiol.    3.  Magnesium.    4.  Omega-3 fatty acids.    5.  Metamucil.    PHYSICAL EXAMINATION:    GENERAL:  Today demonstrates a very pleasant, cooperative and intelligent 61-year-old woman who looks younger than stated age.    VITAL SIGNS:  Her blood pressure  was 102/60, her heart rate 66.  Her height is 1.8 meters, her weight is 82.1 kilograms, her BMI is 25.3.    RESPIRATORY:  Her lungs are clear to percussion and auscultation.    CARDIOVASCULAR:  Shows a normal S1 with a normal S2.  There is no S3.  There is no murmur or click.  Her pulses were symmetrical in the carotid, radial, brachial femoral, popliteal, dorsalis pedis, posterior tibials.    ABDOMEN:  Bowel sounds are present in all 4 quadrants.  Liver percusses to 7 cm.  Spleen is not palpable.  The aorta is not tender.    LOWER EXTREMITIES:  There is no swelling, cyanosis or clubbing.  NEUROLOGIC:  Cranial nerves II through XII are intact.  Her strength equal and symmetrical.  She displays normal insight and judgment.    LABORATORY STUDIES:  Her most recent cholesterol was 207 with an HDL of 81, LDL of 113, triglycerides of 67.  Her most recent creatinine is 0.73.      DIAGNOSTIC STUDIES:  Her ECG from 08/2021 shows a normal sinus rhythm with normal axis, normal intervals and rare PACs.    I reviewed the patient's chest x-ray from 2016.  There is a rim of calcium seen in the aortic knob.    ASSESSMENT:  This 61-year-old woman is asymptomatic and except for her age has minimal risk factors. The presence of aortic calcification may indicate subclinical atherosclerosis but there is insufficient evidence that a low-risk patient such as Ms. Cho would benefit from long-term statin therapy for prevention of adverse vascular events.  I would favor aggressive lifestyle intervention including a Mediterranean-style diet and a regular exercise program.  Her blood pressure is optimal.  She does not use tobacco.    RECOMMENDATIONS:  At this point, I believe that the risk of a statin drug exceeds the benefit in this low-risk patient.  I would advocate focus on lifestyle intervention including Mediterranean-style diet, regular exercise program and weight loss.    We appreciate the opportunity to care for your patient, Alfonzo  Tammie.    Kai Resendiz MD     cc:  Kristal Peña [place] [address]    Kai Resendiz MD        D: 2021   T: 2021   MT: [email or web address]    Name:     HAMILTON FUNK  MRN:      [id number]        Account:      [id number]   :      1960           Service Date: 2021       Document: [id number]

## 2021-05-05 NOTE — PROGRESS NOTES
"HISTORY OF PRESENT ILLNESS:  No MI, no diabetes, no hypertension, . MGF MI at 64, no smoke. No medications.   Surgery 3 C sections varicose vein. PCN.    3 kids 2 grandchildren. Walks. No special exercise program         Orders this Visit:  No orders of the defined types were placed in this encounter.    No orders of the defined types were placed in this encounter.    There are no discontinued medications.    Encounter Diagnosis   Name Primary?     Aortic calcification (H)        CURRENT MEDICATIONS:  Current Outpatient Medications   Medication Sig Dispense Refill     cholecalciferol (VITAMIN D3) 25 mcg (1000 units) capsule        estradiol (ESTRACE) 0.1 MG/GM vaginal cream Place 2 g vaginally twice a week 42.5 g 2     magnesium 250 MG tablet        psyllium (METAMUCIL/KONSYL) capsule        omega-3 acid ethyl esters (LOVAZA) 1 g capsule Take 1 g by mouth         ALLERGIES     Allergies   Allergen Reactions     Penicillins      IV penicillin- Hives, edema hands and feet       PAST MEDICAL, SURGICAL, FAMILY, SOCIAL HISTORY:  History was reviewed and updated as needed, see medical record.    Review of Systems:  A 12-point review of systems was completed, see medical record for detailed review of systems information.    Physical Exam:  Vitals: /60 (BP Location: Right arm, Patient Position: Sitting, Cuff Size: Adult Regular)   Pulse 66   Ht 1.803 m (5' 10.98\")   Wt 82.1 kg (181 lb)   LMP 05/01/2009   SpO2 99%   BMI 25.26 kg/m      Constitutional:           Skin:           Head:           Eyes:           ENT:           Neck:           Chest:           Cardiac:                    Abdomen:           Vascular:                                        Extremities and Back:           Neurological:           ASSESSMENT: Although calcified aorta could indicate subclinical atherosclerosis. No clear indication for statin       RECOMMENDATIONS:   Mediterranean diet  Exercise program  Wt. Control   No " pharmacologic treatment      Recent Lab Results:  LIPID RESULTS:  Lab Results   Component Value Date    CHOL 207 (H) 03/30/2021    HDL 81 03/30/2021     (H) 03/30/2021    TRIG 67 03/30/2021    CHOLHDLRATIO 2.6 11/06/2015       LIVER ENZYME RESULTS:  Lab Results   Component Value Date    AST 24 03/30/2021    ALT 42 03/30/2021       CBC RESULTS:  Lab Results   Component Value Date    WBC 5.0 03/30/2021    RBC 4.40 03/30/2021    HGB 13.8 03/30/2021    HCT 41.7 03/30/2021    MCV 95 03/30/2021    MCH 31.4 03/30/2021    MCHC 33.1 03/30/2021    RDW 12.2 03/30/2021     03/30/2021       BMP RESULTS:  Lab Results   Component Value Date     03/30/2021    POTASSIUM 3.8 03/30/2021    CHLORIDE 104 03/30/2021    CO2 28 03/30/2021    ANIONGAP 8 03/30/2021    GLC 86 03/30/2021    BUN 16 03/30/2021    CR 0.73 03/30/2021    GFRESTIMATED 88 03/30/2021    GFRESTBLACK >90 03/30/2021    VIRA 9.3 03/30/2021        A1C RESULTS:  No results found for: A1C    INR RESULTS:  Lab Results   Component Value Date    INR 1.07 06/23/2004       We greatly appreciate the opportunity to be involved in the care of your patient, Alfonzo Cho.    Sincerely,  Kai Resendiz MD      CC  Alba Iraheta, APRN CNP  [address] [place] [address]

## 2021-05-05 NOTE — LETTER
"5/5/2021    Kristal Peña MD  [address] [facility] [address]    RE: Alfonzo Cho       Dear Colleague,    I had the pleasure of seeing Alfonzo Cho in the [facility].    HISTORY OF PRESENT ILLNESS:  No MI, no diabetes, no hypertension, . MGF MI at 64, no smoke. No medications.   Surgery 3 C sections varicose vein. PCN.    3 kids 2 grandchildren. Walks. No special exercise program         Orders this Visit:  No orders of the defined types were placed in this encounter.    No orders of the defined types were placed in this encounter.    There are no discontinued medications.    Encounter Diagnosis   Name Primary?     Aortic calcification (H)        CURRENT MEDICATIONS:  Current Outpatient Medications   Medication Sig Dispense Refill     cholecalciferol (VITAMIN D3) 25 mcg (1000 units) capsule        estradiol (ESTRACE) 0.1 MG/GM vaginal cream Place 2 g vaginally twice a week 42.5 g 2     magnesium 250 MG tablet        psyllium (METAMUCIL/KONSYL) capsule        omega-3 acid ethyl esters (LOVAZA) 1 g capsule Take 1 g by mouth         ALLERGIES     Allergies   Allergen Reactions     Penicillins      IV penicillin- Hives, edema hands and feet       PAST MEDICAL, SURGICAL, FAMILY, SOCIAL HISTORY:  History was reviewed and updated as needed, see medical record.    Review of Systems:  A 12-point review of systems was completed, see medical record for detailed review of systems information.    Physical Exam:  Vitals: /60 (BP Location: Right arm, Patient Position: Sitting, Cuff Size: Adult Regular)   Pulse 66   Ht 1.803 m (5' 10.98\")   Wt 82.1 kg (181 lb)   LMP 05/01/2009   SpO2 99%   BMI 25.26 kg/m      Constitutional:           Skin:           Head:           Eyes:           ENT:           Neck:           Chest:           Cardiac:                    Abdomen:           Vascular:                                    "     Extremities and Back:           Neurological:           ASSESSMENT: Although calcified aorta could indicate subclinical atherosclerosis. No clear indication for statin       RECOMMENDATIONS:   Mediterranean diet  Exercise program  Wt. Control   No pharmacologic treatment      Recent Lab Results:  LIPID RESULTS:  Lab Results   Component Value Date    CHOL 207 (H) 03/30/2021    HDL 81 03/30/2021     (H) 03/30/2021    TRIG 67 03/30/2021    CHOLHDLRATIO 2.6 11/06/2015       LIVER ENZYME RESULTS:  Lab Results   Component Value Date    AST 24 03/30/2021    ALT 42 03/30/2021       CBC RESULTS:  Lab Results   Component Value Date    WBC 5.0 03/30/2021    RBC 4.40 03/30/2021    HGB 13.8 03/30/2021    HCT 41.7 03/30/2021    MCV 95 03/30/2021    MCH 31.4 03/30/2021    MCHC 33.1 03/30/2021    RDW 12.2 03/30/2021     03/30/2021       BMP RESULTS:  Lab Results   Component Value Date     03/30/2021    POTASSIUM 3.8 03/30/2021    CHLORIDE 104 03/30/2021    CO2 28 03/30/2021    ANIONGAP 8 03/30/2021    GLC 86 03/30/2021    BUN 16 03/30/2021    CR 0.73 03/30/2021    GFRESTIMATED 88 03/30/2021    GFRESTBLACK >90 03/30/2021    VIRA 9.3 03/30/2021        A1C RESULTS:  No results found for: A1C    INR RESULTS:  Lab Results   Component Value Date    INR 1.07 06/23/2004       We greatly appreciate the opportunity to be involved in the care of your patient, Alfonzo Cho.    Sincerely,  Kai Resendiz MD      CC  Alba Iraheta, APRN CNP  [address] [place] [address]

## 2021-05-11 ENCOUNTER — DOCUMENTATION ONLY (OUTPATIENT)
Dept: OTHER | Facility: CLINIC | Age: 61
End: 2021-05-11

## 2021-06-28 ENCOUNTER — MYC MEDICAL ADVICE (OUTPATIENT)
Dept: FAMILY MEDICINE | Facility: CLINIC | Age: 61
End: 2021-06-28

## 2021-06-29 NOTE — TELEPHONE ENCOUNTER
Please see my chart message below     Please review and advise     Thank you     Michelle Mello RN, BSN  [place] [facility]

## 2021-06-30 ENCOUNTER — E-VISIT (OUTPATIENT)
Dept: FAMILY MEDICINE | Facility: CLINIC | Age: 61
End: 2021-06-30
Payer: COMMERCIAL

## 2021-06-30 DIAGNOSIS — J01.90 ACUTE SINUSITIS WITH SYMPTOMS > 10 DAYS: Primary | ICD-10-CM

## 2021-06-30 PROCEDURE — 99421 OL DIG E/M SVC 5-10 MIN: CPT | Performed by: NURSE PRACTITIONER

## 2021-07-01 RX ORDER — FLUTICASONE PROPIONATE 50 MCG
2 SPRAY, SUSPENSION (ML) NASAL DAILY
Qty: 16 G | Refills: 1 | Status: SHIPPED | OUTPATIENT
Start: 2021-07-01 | End: 2022-11-01

## 2021-07-01 RX ORDER — DOXYCYCLINE HYCLATE 100 MG
100 TABLET ORAL 2 TIMES DAILY
Qty: 14 TABLET | Refills: 0 | Status: SHIPPED | OUTPATIENT
Start: 2021-07-01 | End: 2021-07-08

## 2021-07-01 NOTE — PATIENT INSTRUCTIONS
Dear Alfonzo Cho    After reviewing your responses, I've been able to diagnose you with?a sinus infection caused by bacteria.?     Based on your responses and diagnosis, I have prescribed Doxycycline and Flonase to treat your symptoms. I have sent this to your pharmacy.?     It is also important to stay well hydrated, get lots of rest and take over-the-counter decongestants,?tylenol?or ibuprofen if you?are able to?take those medications per your primary care provider to help relieve discomfort.?     It is important that you take?all of?your prescribed medication even if your symptoms are improving after a few doses.? Taking?all of?your medicine helps prevent the symptoms from returning.?     If your symptoms worsen, you develop severe headache, vomiting, high fever (>102), or are not improving in 7 days, please contact your primary care provider for an appointment or visit any of our convenient Walk-in Care or Urgent Care Centers to be seen which can be found on our website?here.?     Thanks again for choosing?us?as your health care partner,?   ?  Alba Iraheta, LORENZO CNP?     Sinusitis (Antibiotic Treatment)    The sinuses are air-filled spaces within the bones of the face. They connect to the inside of the nose. Sinusitis is an inflammation of the tissue that lines the sinuses. Sinusitis can occur during a cold. It can also happen due to allergies to pollens and other particles in the air. Sinusitis can cause symptoms of sinus congestion and a feeling of fullness. A sinus infection causes fever, headache, and facial pain. There is often green or yellow fluid draining from the nose or into the back of the throat (post-nasal drip). You have been given antibiotics to treat this condition.   Home care    Take the full course of antibiotics as instructed. Don't stop taking them, even when you feel better.    Drink plenty of water, hot tea, and other liquids as directed by the healthcare provider. This may help  thin nasal mucus. It also may help your sinuses drain fluids.    Heat may help soothe painful areas of your face. Use a towel soaked in hot water. Or,  the shower and direct the warm spray onto your face. Using a vaporizer along with a menthol rub at night may also help soothe symptoms.     An expectorant with guaifenesin may help thin nasal mucus and help your sinuses drain fluids. Talk with your provider or pharmacists before taking an over-the-counter (OTC) medicine if you have any questions about it or its side effects..    You can use an OTC decongestant, unless a similar medicine was prescribed to you. Nasal sprays work the fastest. Use one that contains phenylephrine or oxymetazoline. First blow your nose gently. Then use the spray. Don't use these medicines more often than directed on the label. If you do, your symptoms may get worse. You may also take pills that contain pseudoephedrine. Don t use products that combine multiple medicines. This is because side effects may be increased. Read labels. You can also ask the pharmacist for help. (People with high blood pressure should not use decongestants. They can raise blood pressure.) Talk with your provider or pharmacist if you have any questions about the medicine..    OTC antihistamines may help if allergies contributed to your sinusitis. Talk with your provider or pharmacist if you have any questions about the medicine..    Don't use nasal rinses or irrigation during an acute sinus infection, unless your healthcare provider tells you to. Rinsing may spread the infection to other areas in your sinuses.    Use acetaminophen or ibuprofen to control pain, unless another pain medicine was prescribed to you. If you have chronic liver or kidney disease or ever had a stomach ulcer, talk with your healthcare provider before using these medicines. Never give aspirin to anyone under age 18 who is ill with a fever. It may cause severe liver damage.    Don't  smoke. This can make symptoms worse.    Follow-up care  Follow up with your healthcare provider, or as advised.   When to seek medical advice  Call your healthcare provider if any of these occur:     Facial pain or headache that gets worse    Stiff neck    Unusual drowsiness or confusion    Swelling of your forehead or eyelids    Symptoms don't go away in 10 days    Vision problems, such as blurred or double vision    Fever of 100.4 F (38 C) or higher, or as directed by your healthcare provider  Call 911  Call 911 if any of these occur:     Seizure    Trouble breathing    Feeling dizzy or faint    Fingernails, skin or lips look blue, purple , or gray  Prevention  Here are steps you can take to help prevent an infection:     Keep good hand washing habits.    Don t have close contact with people who have sore throats, colds, or other upper respiratory infections.    Don t smoke, and stay away from secondhand smoke.    Stay up to date with of your vaccines.  [email or web address] last reviewed this educational content on 12/1/2019 2000-2021 [employer]. All rights reserved. This information is not intended as a substitute for professional medical care. Always follow your healthcare professional's instructions.

## 2021-07-08 ENCOUNTER — HOSPITAL ENCOUNTER (OUTPATIENT)
Dept: MAMMOGRAPHY | Facility: CLINIC | Age: 61
Discharge: HOME | End: 2021-07-08
Attending: NURSE PRACTITIONER | Admitting: NURSE PRACTITIONER
Payer: COMMERCIAL

## 2021-07-08 DIAGNOSIS — Z12.39 ENCOUNTER FOR SCREENING FOR MALIGNANT NEOPLASM OF BREAST, UNSPECIFIED SCREENING MODALITY: ICD-10-CM

## 2021-07-08 PROCEDURE — 77063 BREAST TOMOSYNTHESIS BI: CPT

## 2021-07-08 NOTE — RESULT ENCOUNTER NOTE
Dear Alfonzo,     -Mammogram was normal.  ADVISE: rechecking in 1 year.      Please send a Communication [employer] message or call [phone]  if you have any questions.      LORENZO Dooley, CNP  [facility] [place]    If you have further questions about the interpretation of your labs, [email or web address] is a good website to check out for further information.

## 2021-09-18 ENCOUNTER — HEALTH MAINTENANCE LETTER (OUTPATIENT)
Age: 61
End: 2021-09-18

## 2021-10-15 ENCOUNTER — MYC MEDICAL ADVICE (OUTPATIENT)
Dept: FAMILY MEDICINE | Facility: CLINIC | Age: 61
End: 2021-10-15

## 2021-10-19 NOTE — TELEPHONE ENCOUNTER
My chart message sent     Michelle Mello RN, BSN  [facility] [place]

## 2021-10-20 ENCOUNTER — E-VISIT (OUTPATIENT)
Dept: URGENT CARE | Facility: CLINIC | Age: 61
End: 2021-10-20
Payer: COMMERCIAL

## 2021-10-20 DIAGNOSIS — R51.9 FACIAL PAIN: Primary | ICD-10-CM

## 2021-10-20 PROCEDURE — 99207 PR NON-BILLABLE SERV PER CHARTING: CPT | Performed by: NURSE PRACTITIONER

## 2021-10-20 NOTE — PATIENT INSTRUCTIONS
Thank you for choosing us for your care. I think an in-clinic visit would be best next steps based on your symptoms. Please schedule a clinic appointment; you won t be charged for this eVisit.      You can schedule an appointment right here in [facility], or call [phone]

## 2021-10-21 ENCOUNTER — OFFICE VISIT (OUTPATIENT)
Dept: FAMILY MEDICINE | Facility: CLINIC | Age: 61
End: 2021-10-21
Payer: COMMERCIAL

## 2021-10-21 VITALS
RESPIRATION RATE: 20 BRPM | SYSTOLIC BLOOD PRESSURE: 102 MMHG | WEIGHT: 182.5 LBS | BODY MASS INDEX: 26.13 KG/M2 | OXYGEN SATURATION: 100 % | DIASTOLIC BLOOD PRESSURE: 60 MMHG | HEIGHT: 70 IN | TEMPERATURE: 97.7 F | HEART RATE: 81 BPM

## 2021-10-21 DIAGNOSIS — R43.1 ABNORMAL SMELL: Primary | ICD-10-CM

## 2021-10-21 PROCEDURE — 99213 OFFICE O/P EST LOW 20 MIN: CPT | Performed by: PHYSICIAN ASSISTANT

## 2021-10-21 RX ORDER — MOXIFLOXACIN 5 MG/ML
SOLUTION/ DROPS OPHTHALMIC
COMMUNITY
Start: 2021-06-05 | End: 2021-10-21

## 2021-10-21 RX ORDER — DOXYCYCLINE 100 MG/1
CAPSULE ORAL
COMMUNITY
Start: 2021-07-01 | End: 2021-10-21

## 2021-10-21 ASSESSMENT — MIFFLIN-ST. JEOR: SCORE: 1463.06

## 2021-10-21 NOTE — PROGRESS NOTES
Assessment & Plan     Abnormal smell  Chronic ongoing issues with smell disturbance, no nasal cavity or face trauma, not acutely ill - so seems atypical of classic bacterial sinusitis at this time. Reviewed exact etiology not yet clear, but DDx includes allergic rhinitis and seasonal allergies that may be undertreated as not current taking any antihistamine or any consistent use of flonase VS Possible nasal polyp VS rule out FB in light of reported recall from CPAP machine, but none seen on today's exam. Advise to start using fluticasone daily and consistently for at least one month to see if any benefit with smell issues or post-nasal drip. Also start OTC 2nd generation antihistamine such as claritin, zyrtec, or allegra for 1 month. If no symptom improvement schedule appointment with ENT for further evaluation and consideration to nasopharyngeal scope vs other imaging should they feel it's indicated.  - Otolaryngology Referral    Return in about 1 month (around 11/21/2021) for ENT consult.    This patient was seen alongside a student physician assistant, VLADIMIR Galo. The history, physical exam, review of systems, objective data and assessment/plan were completed by myself.   Kelly Fraser PA-C  [facility] PRIOR FLACO Mejía is a 61 year old who presents for the following health issues     HPI     Acute Illness  Acute illness concerns: Sinus  Onset/Duration: April..off smell in nose  Symptoms:  Fever: no  Chills/Sweats: no  Headache (location?): YES  Sinus Pressure: YES  Conjunctivitis:  no  Ear Pain: no  Rhinorrhea: YES  Congestion: no  Sore Throat: no  Cough: occasionally   Wheeze: no  Decreased Appetite: no  Nausea: no  Vomiting: no  Diarrhea: no  Dysuria/Freq: YES frequency - attributes to post-menopause/aging, no concerns for UTI  Dysuria or Hematuria: no  Fatigue/Achiness: YES fatigue  Sick/Strep Exposure: no  Therapies tried and outcome: Doxycycline  "jony Mejía is a 61 year old female presenting with abnormal smell that she's been experiencing sporadically on/off since April 2021 as well sinus concerns. She has a past medical history significant for seasonal allergic rhinitis, GERD, and sleep apnea. In the past twenty years has had 2-3x occurrences of acute bacterial sinusitis treated with antibiotics. What she is dealing with now is different. She describes this intermittent \"smoke smell' in her nose as if a fire is burning. Lives with several of her family members and no one else smells smoke whenever it's occurring. Doesn't think anything in her home is causing it. No trauma to her head/face/nose. The first occurrence happened six months ago but then it went away and didn't happen for quite some time. In the past few months the smoke smell has become more frequent. Not current smoker, no secondhand exposure. Has not tried washing out her sinuses/nasal cavity with OTC products. Uses fluticasonse when she gets congested PRN but hasn't used nasal spray in a few months, tries to avoid medications. At baseline typically has post-nasal drainage, attributes to seasonal allergies. Every morning coughs up thick/yellow mucus but not as the day goes on. Occasional cough, which she attributes to post-nasal drip and allergy mediated. Denies fever/chills, congestion, conjunctivitis, sore throat, wheezing, N/V/D.    Alfonzo was treated for an acute sinusitis at the end of June/July this past summer with Doxycycline. Her 1 year old grandson had an upper URI with purulent nasal drainage, thinks she caught from him. At that time her symptoms were mostly right side dominant - sinus congestion/pressure/pain, mucopurulent nasal and eye mucus, fever, cough that lasted 9-10 days. Was feeling better after antibiotic but then started to feel worse again. Traveled to [place] in mid-late July, had sudden excruciating right ear pain 10/10 on plane, \"felt like an ice pick in ear " "for 15 mins,\" no popping sounds, no hx of TM rupture. Did not lose hearing. Wears hearing aids for presbycusis and has tinnitus at baseline daily, unchanged. Ear pain significantly resolved once landing, no reccurences.     In August she developed right sided tooth pain lasting 1.5 weeks, thought it was dental abscess (never had one before) but works as dental hygienist. Dental exam was normal and x-ray negative. Dentist referred back to see her primary care doctor, thought tooth pain could be potentially sinus related. Tooth pain will infrequently recur but is very mild and not bothersome anymore.    Not sleeping well lately. Has MIGUEL, not currently wearing CPAP due to recall on mask in June. Has been having morning headaches and daytime fatigue that started after discontinuation of sleep device - had same issue prior to treatment for MIGUEL in past so not new or different. Recall d/t black colored foam in the machine breaking down into little pieces and entering into nose/mouth piece. Concerned because she found tiny black particles in water humidifier basin even before being notified of the recall. Wondering if this has anything to do with her smoke smell and if there are particles in her nasal cavity.    Alfonzo also complains today of this 'feeling of pressure behind the eyes' starting a few weeks ago and is sporadic in nature. Also feels that her eyes are more liquidly than usual, no tearing or obvious signs of watery eye drainage. These eye issues occur several times a week. No hx of eye problems or glaucoma. Had bilateral cataract surgery 4/2021 without complications, no longer wears glasses and vision much improved. Has an ophthalmology appointment scheduled tomorrow. Encouraged to review recent eye complaints and address with eye doctor at visit.     Current Outpatient Medications   Medication     cholecalciferol (VITAMIN D3) 25 mcg (1000 units) capsule     estradiol (ESTRACE) 0.1 MG/GM vaginal cream     " "fluticasone (FLONASE) 50 MCG/ACT nasal spray     magnesium 250 MG tablet     psyllium (METAMUCIL/KONSYL) capsule     No current facility-administered medications for this visit.     Allergies   Allergen Reactions     Penicillins      IV penicillin- Hives, edema hands and feet       Review of Systems   Constitutional, HEENT, cardiovascular, pulmonary, GI, , musculoskeletal, neuro, skin, endocrine and psych systems are negative, except as otherwise noted.      Objective    /60   Pulse 81   Temp 97.7  F (36.5  C) (Tympanic)   Resp 20   Ht 1.778 m (5' 10\")   Wt 82.8 kg (182 lb 8 oz)   LMP 05/01/2009   SpO2 100%   BMI 26.19 kg/m    Body mass index is 26.19 kg/m .     Physical Exam   GENERAL: healthy, alert and no distress  EYES: Eyes grossly normal to inspection, PERRL and conjunctivae and sclerae normal  HENT: ear canals and TM's normal, nose and mouth without ulcers or lesions, pink and moist oral mucosa, no signs of trauma or erythema in nasal cavity, possible turbinate hypertrophy vs. polyp? visualized in L naris. No apparent FB.  NECK: no adenopathy, no asymmetry, masses, or scars and thyroid normal to palpation  RESP: lungs clear to auscultation - no rales, rhonchi or wheezes  CV: regular rate and rhythm, normal S1 S2, no S3 or S4, no murmur, click or rub, no peripheral edema and peripheral pulses strong  MS: no gross musculoskeletal defects noted, no edema  SKIN: no suspicious lesions or rashes  NEURO: Normal strength and tone, mentation intact and speech normal  PSYCH: mentation appears normal, affect normal/bright                "

## 2021-10-28 ENCOUNTER — TELEPHONE (OUTPATIENT)
Dept: FAMILY MEDICINE | Facility: CLINIC | Age: 61
End: 2021-10-28

## 2021-11-22 ENCOUNTER — MYC MEDICAL ADVICE (OUTPATIENT)
Dept: FAMILY MEDICINE | Facility: CLINIC | Age: 61
End: 2021-11-22
Payer: COMMERCIAL

## 2021-11-29 ENCOUNTER — TELEPHONE (OUTPATIENT)
Dept: SLEEP MEDICINE | Facility: CLINIC | Age: 61
End: 2021-11-29
Payer: COMMERCIAL

## 2021-11-29 NOTE — TELEPHONE ENCOUNTER
Reason for call:  Other   Patient called regarding (reason for call): CPAP recall  Additional comments: Pt needs a call back, CPAP has been recalled    Phone number to reach patient:  Cell number on file:    Telephone Information:   Mobile [phone]       Best Time:  any    Can we leave a detailed message on this number?  YES    Travel screening: Not Applicable

## 2021-11-30 NOTE — TELEPHONE ENCOUNTER
Spoke with patient she has stopped using her oral appliance.      Patient call regarding [employer] recall for their pap device.    Device type:: Auto CPAP    Supplemental oxygen: No , if yes moved to advanced device workflow.     Current durable medical equipment provider: Other DME     Age of your current device:  greater than 5 years old    History review:     Does the patient have the following?      COPD No     Hypoventilation No    Pulmonary hypertension No    Neuromuscular disease related respiratory problems No    History of past or present cardiac arrhythmia  No    History of heart failure  No    Recent hospitalization for breathing problems No       Other concerns:    DOT license requiring treatment of obstructive sleep apnea occupation that requires operation of hazardous equipment  No    Extreme sleepiness or drowsy driving prior to using CPAP or BiPAP treatment? Yes       Discontinuation of PAP therapy would lead to substantial deterioration of functional status or quality of life. Yes    If yes to any of the questions      Advised patient to continue using therapy until device is replaced or repaired.    Advised patient to avoid unapproved cleaning methods, such as ozone (see FDA safety communication on use of ozone ).    Has patient registered device? Yes Advised patient to register for repair or replacement on the [employer] website.  Patient can call [employer] at [phone] for additional support.    Bacterial filters to reduce exposure to particulates are sometimes cumbersome to use and are not currently recommended by the .If you choose to use a bacterial filter, consider discontinuing using humidity with the filter.           Please contact your DME to see if you are eligible to receive a new device if it is over 5 years old.  You may also choose to pay out of pocket for a new device, if your insurance does not cover a new device at this time.       Plan :     Patient  wishes to continue therapy. Recommended  to continue use of therapy until it is repaired or replaced.

## 2021-12-03 ENCOUNTER — TRANSFERRED RECORDS (OUTPATIENT)
Dept: HEALTH INFORMATION MANAGEMENT | Facility: CLINIC | Age: 61
End: 2021-12-03
Payer: COMMERCIAL

## 2021-12-13 NOTE — TELEPHONE ENCOUNTER
S-(situation): Pt calling with covid questions    B-(background): Pt noted she has covid like symptoms, went in for testing at [facility], resulted positive. Pt inquired if needing further testing.    A-(assessment): Writer discussed no need for further testing, since having a rapid positive covid test along with symptoms. Pt advised to quarantine, discussed quarantine period and when can be done with quarantine. Patient stated an understanding and agreed with plan.      R-(recommendations): Pt advised if worsening symptoms or difficulty breathing to call back or be seen. Patient stated an understanding and agreed with plan.    Jeevan ESPITIA RN   [facility]      
3y3m old F with a PMHx of seizure disorder since infancy, on Valproic acid presents to the ED BIB mother c/o cough, fever, rhinorrhea x 14 days. Also decreased PO intake x  7 days.  Denies N/V/D. Pt traveled to [place], returned 8 days ago.  Pt's immunizations UTD. Mother is also coughing. Mother is not COVID vaccinated.  NKDA. Pt and mother speak Turkmen; nursing assistant at bedside to translate.
No indicators present

## 2021-12-20 ENCOUNTER — TELEPHONE (OUTPATIENT)
Dept: VASCULAR SURGERY | Facility: CLINIC | Age: 61
End: 2021-12-20
Payer: COMMERCIAL

## 2021-12-20 DIAGNOSIS — I83.893 SYMPTOMATIC VARICOSE VEINS OF BOTH LOWER EXTREMITIES: Primary | ICD-10-CM

## 2021-12-20 NOTE — TELEPHONE ENCOUNTER
PT NEEDS HOSE SENT IN TO [facility]. DONE  PT WANTS 2 PAIRS OF KNEE HIGH BLACK CLOSED TOE.

## 2022-02-10 ENCOUNTER — OFFICE VISIT (OUTPATIENT)
Dept: FAMILY MEDICINE | Facility: CLINIC | Age: 62
End: 2022-02-10
Payer: COMMERCIAL

## 2022-02-10 VITALS — SYSTOLIC BLOOD PRESSURE: 110 MMHG | DIASTOLIC BLOOD PRESSURE: 62 MMHG

## 2022-02-10 DIAGNOSIS — L24.9 IRRITANT CONTACT DERMATITIS, UNSPECIFIED TRIGGER: ICD-10-CM

## 2022-02-10 DIAGNOSIS — L57.8 SOLAR ELASTOSIS: ICD-10-CM

## 2022-02-10 DIAGNOSIS — L71.9 ROSACEA: Primary | ICD-10-CM

## 2022-02-10 DIAGNOSIS — L81.4 LENTIGINES: ICD-10-CM

## 2022-02-10 DIAGNOSIS — L57.8 ACTINIC SKIN DAMAGE: ICD-10-CM

## 2022-02-10 PROCEDURE — 99214 OFFICE O/P EST MOD 30 MIN: CPT | Performed by: PHYSICIAN ASSISTANT

## 2022-02-10 RX ORDER — METRONIDAZOLE 7.5 MG/G
LOTION TOPICAL
Qty: 60 ML | Refills: 4 | Status: SHIPPED | OUTPATIENT
Start: 2022-02-10 | End: 2023-02-17

## 2022-02-10 NOTE — PROGRESS NOTES
[facility] Note  Encounter Date: Feb 10, 2022  Office Visit   ____________________________________________    Assessment & Plan:    1. Acne rosacea  Patient has been using metronidazole lotion for 2.5 years now and feels that this is quite helpful. Last prescription has run out and she would like a refill. Discussed other options including other topicals and lasers. Patient would like to continue with the current regimen as is.   - Continue metronidazole 0.75% lotion bid prn, refill provided.  -Treatment options including topical Finacea, Sulfa, Metronidazole, Ivermectin, and oral Doxycyline. Redness of rosacea is more difficult to treat and treatment is considered cosmetic.  Redness can be treated with lasers, Mirvaso or Rofade.    Avoid triggers such as sunlight, spicy foods, and alcohol. Wear sunscreen everyday of the year.     Wear a sunscreen with at least SPF 30 on your face, ears, neck and V of the chest daily. Wear sunscreen on other areas of the body if those areas are exposed to the sun throughout the day. Sunscreens can contain physical and/or chemical blockers. Physical blockers are less likely to clog pores, these include zinc oxide and titanium dioxide. Reapply every two hour and after swimming.    2. Actinic sun damage and solar elastosis  Due to chronic sun exposure. No evident lesions today, discussed Ln2 vs Efudex vs OTC retinoids for exfoliating the skin. Discussed that the redness may be related to rosacea, can wait to try adapalene if metronidazole does not clear this up.  - Start adapalene gel ever 2-3 nights to affected areas, increasing to nightly as tolerated.     3. ICD upper cutaneous lips  Begin aquaphor  Disc topical steroid-pt defers    4.  Lentigines  Begin otc 2% hydroquinone    Follow-up: 1 year(s) in-person, or earlier for new or changing lesions    Labs and office visit notes reviewed:  Derm 07/20/2018    Provider Disclosure:   The documentation recorded by the aaronibrenetta  accurately reflects the services I personally performed and the decisions made by me.    Carolyn Contreras, MS, LINDSEY      Scribe Disclosure:  I, Hanna Padgett, am serving as a scribe to document services personally performed by Carolyn Contreras PA-C based on data collection and the provider's statements to me.   ____________________________________________________    HPI:  Ms. Alfonzo Cho is a(n) 61 year old female who presents today as a return patient for rosacea. Patient states this has been present for a long time. Patient reports the following symptoms: redness, occasional dry, flakiness. Patient reports the following previous treatments: metro lotion with great control. Patient reports the following modifying factors: none. Associated symptoms: none. She also makes note of dry skin on the nose which occasionally flares up every few weeks for about 1 week at a time and becomes irritated, scaly, and raw. Patient has no other skin complaints today. Remainder of the HPI, Meds, PMH, Allergies, FH, and SH was reviewed in chart.       Pertinent findings: no personal history of skin cancer.    Medications:  Current Outpatient Medications   Medication     cholecalciferol (VITAMIN D3) 25 mcg (1000 units) capsule     estradiol (ESTRACE) 0.1 MG/GM vaginal cream     fluticasone (FLONASE) 50 MCG/ACT nasal spray     magnesium 250 MG tablet     psyllium (METAMUCIL/KONSYL) capsule     No current facility-administered medications for this visit.        Past Medical History:   Patient Active Problem List   Diagnosis     Pain in joint, pelvic region and thigh     Seasonal allergic rhinitis     Symptomatic menopausal or female climacteric states     Sleep apnea     history of Thrombophlebitis leg (H)     Plantar fasciitis     Seborrheic dermatitis     Infrapatellar bursitis     Low blood pressure- usual - asymptomatic- 90/60 = baseline     Family history of hypothyroidism- father     Memory difficulties- some short-term,  worse at work     Aortic calcification (H)     Nonrheumatic aortic valve insufficiency - mild - no symptoms.      BMI 28.0-28.9,adult     Asymptomatic varicose veins     Gastroesophageal reflux disease, esophagitis presence not specified     Inadequate sleep hygiene     Past Medical History:   Diagnosis Date     Aortic calcification (H) 2016     Asymptomatic varicose veins      Contact dermatitis and other eczema, due to unspecified cause     seasonally     Infrapatellar bursitis      Nonrheumatic aortic valve insufficiency - mild - no symptoms.     incidental finding on thoracic XR     Papanicolaou smear of cervix with atypical squamous cells of undetermined significance (ASC-US) 16    Neg HR HPV     Phlebitis and thrombophlebitis of femoral vein (deep) (superficial) (H) not candidate for HRT     popliteal after progesterone oral therapy - no other clots      Sleep apnea     now on CPAP        Past Surgical History:   Past Surgical History:   Procedure Laterality Date      SECTION      S/P CSECT X 3     ENDOSCOPIC STRIPPING VEIN(S)  92,02    S/P VEIN STRIPPING      TUBAL LIGATION      with 3rd      ZZHC COLONOSCOPY THRU STOMA, DIAGNOSTIC  2009     [place] - normal - [place] in [place]        Family History:  Family History   Problem Relation Age of Onset     Neurologic Disorder Father         ALS     Hypothyroidism Father      Glaucoma Father         glaucoma     Thyroid Disease Father      Osteoporosis Mother         on Fosamax      Uterine Cancer Mother 78        endometrial cancer s/p hysterectomy at age 80 - still alive      Esophageal Cancer Maternal Uncle      Esophageal Cancer Maternal Uncle      Hearing Loss Brother      No Known Problems Son      No Known Problems Daughter      No Known Problems Son      Hearing Loss Brother      Hearing Loss Brother      C.A.D. Maternal Grandfather          age 62     Diabetes Paternal Uncle      Cerebrovascular Disease Maternal  Grandmother          age 70      No Known Problems Paternal Grandmother      No Known Problems Paternal Grandfather        Social History:  Social History     Tobacco Use     Smoking status: Former Smoker     Packs/day: 0.00     Years: 2.00     Pack years: 0.00     Types: Cigarettes     Start date: 1978     Quit date: 1980     Years since quittin.1     Smokeless tobacco: Never Used     Tobacco comment: only smoked a couple of years   Substance Use Topics     Alcohol use: Yes     Alcohol/week: 0.0 standard drinks     Comment: 2-4 drinks/month          Review Of Systems:  Skin: rosacea, red spot above lip  Eyes: negative  Ears/Nose/Throat: negative  Respiratory: No shortness of breath, dyspnea on exertion, cough, or hemoptysis  Cardiovascular: negative  Gastrointestinal: negative  Genitourinary: negative  Musculoskeletal: negative  Neurologic: negative  Psychiatric: negative  Hematologic/Lymphatic/Immunologic: negative  Endocrine: negative      Objective:     LMP 2009   Eyes: Conjunctivae/lids: Normal   ENT: Lips:  Normal  MSK: Normal  Cardiovascular: Peripheral edema none  Pulm: Breathing Normal  Neuro/Psych: Orientation: Normal; Mood/Affect: Normal, NAD, WDWN  Pt accompanied by: self  Following areas examined: Scalp, face, eyelids, lips, neck  Mata skin type: I   Findings:    Tan WD smooth macules on face.

## 2022-02-10 NOTE — PATIENT INSTRUCTIONS
Adapalene ( differin) gel. Apply a pea-sized amount to each affected area. Start every 3-4 nights working up to every night.   Consider hydroquinone 2% to brown spots on face    Rosacea  Begin metronidazole to face 2x a day.     Treatment options including topical Finacea, Sulfa, Metronidazole, Ivermectin, and oral Doxycyline. Redness of rosacea is more difficult to treat and treatment is considered cosmetic.  Redness can be treated with lasers, Mirvaso or Rofade.    Avoid triggers such as sunlight, spicy foods, and alcohol. Wear sunscreen everyday of the year.     Wear a sunscreen with at least SPF 30 on your face, ears, neck and V of the chest daily. Wear sunscreen on other areas of the body if those areas are exposed to the sun throughout the day. Sunscreens can contain physical and/or chemical blockers. Physical blockers are less likely to clog pores, these include zinc oxide and titanium dioxide. Reapply every two hour and after swimming.     Sunscreen examples: https://www.ewg.org/sunscreen/    Call [email or web address] if aquaphor does not help the skin above the lip.

## 2022-02-10 NOTE — LETTER
2/10/2022         RE: Alfonzo Cho  [id number] [id number]        Dear Colleague,    Thank you for referring your patient, Alfonzo Cho, to the [facility]. Please see a copy of my visit note below.    [facility]  Encounter Date: Feb 10, 2022  Office Visit   ____________________________________________    Assessment & Plan:    1. Acne rosacea  Patient has been using metronidazole lotion for 2.5 years now and feels that this is quite helpful. Last prescription has run out and she would like a refill. Discussed other options including other topicals and lasers. Patient would like to continue with the current regimen as is.   - Continue metronidazole 0.75% lotion bid prn, refill provided.  -Treatment options including topical Finacea, Sulfa, Metronidazole, Ivermectin, and oral Doxycyline. Redness of rosacea is more difficult to treat and treatment is considered cosmetic.  Redness can be treated with lasers, Mirvaso or Rofade.    Avoid triggers such as sunlight, spicy foods, and alcohol. Wear sunscreen everyday of the year.     Wear a sunscreen with at least SPF 30 on your face, ears, neck and V of the chest daily. Wear sunscreen on other areas of the body if those areas are exposed to the sun throughout the day. Sunscreens can contain physical and/or chemical blockers. Physical blockers are less likely to clog pores, these include zinc oxide and titanium dioxide. Reapply every two hour and after swimming.    2. Actinic sun damage and solar elastosis  Due to chronic sun exposure. No evident lesions today, discussed Ln2 vs Efudex vs OTC retinoids for exfoliating the skin. Discussed that the redness may be related to rosacea, can wait to try adapalene if metronidazole does not clear this up.  - Start adapalene gel ever 2-3 nights to affected areas, increasing to nightly as tolerated.     3. ICD upper cutaneous lips  Begin aquaphor  Disc topical  steroid-pt defers    4.  Lentigines  Begin otc 2% hydroquinone    Follow-up: 1 year(s) in-person, or earlier for new or changing lesions    Labs and office visit notes reviewed:  Derm 07/20/2018    Provider Disclosure:   The documentation recorded by the scribe accurately reflects the services I personally performed and the decisions made by me.    Carolyn Contreras, MS, LINDSEY      Scribe Disclosure:  I, Hanna Padgett, am serving as a scribe to document services personally performed by Carolyn Contreras PA-C based on data collection and the provider's statements to me.   ____________________________________________________    HPI:  Ms. Alfonzo Cho is a(n) 61 year old female who presents today as a return patient for rosacea. Patient states this has been present for a long time. Patient reports the following symptoms: redness, occasional dry, flakiness. Patient reports the following previous treatments: metro lotion with great control. Patient reports the following modifying factors: none. Associated symptoms: none. She also makes note of dry skin on the nose which occasionally flares up every few weeks for about 1 week at a time and becomes irritated, scaly, and raw. Patient has no other skin complaints today. Remainder of the HPI, Meds, PMH, Allergies, FH, and SH was reviewed in chart.       Pertinent findings: no personal history of skin cancer.    Medications:  Current Outpatient Medications   Medication     cholecalciferol (VITAMIN D3) 25 mcg (1000 units) capsule     estradiol (ESTRACE) 0.1 MG/GM vaginal cream     fluticasone (FLONASE) 50 MCG/ACT nasal spray     magnesium 250 MG tablet     psyllium (METAMUCIL/KONSYL) capsule     No current facility-administered medications for this visit.        Past Medical History:   Patient Active Problem List   Diagnosis     Pain in joint, pelvic region and thigh     Seasonal allergic rhinitis     Symptomatic menopausal or female climacteric states     Sleep apnea      history of Thrombophlebitis leg (H)     Plantar fasciitis     Seborrheic dermatitis     Infrapatellar bursitis     Low blood pressure- usual - asymptomatic- 90/60 = baseline     Family history of hypothyroidism- father     Memory difficulties- some short-term, worse at work     Aortic calcification (H)     Nonrheumatic aortic valve insufficiency - mild - no symptoms.      BMI 28.0-28.9,adult     Asymptomatic varicose veins     Gastroesophageal reflux disease, esophagitis presence not specified     Inadequate sleep hygiene     Past Medical History:   Diagnosis Date     Aortic calcification (H) 2016     Asymptomatic varicose veins      Contact dermatitis and other eczema, due to unspecified cause     seasonally     Infrapatellar bursitis      Nonrheumatic aortic valve insufficiency - mild - no symptoms.     incidental finding on thoracic XR     Papanicolaou smear of cervix with atypical squamous cells of undetermined significance (ASC-US) 16    Neg HR HPV     Phlebitis and thrombophlebitis of femoral vein (deep) (superficial) (H) not candidate for HRT     popliteal after progesterone oral therapy - no other clots      Sleep apnea     now on CPAP        Past Surgical History:   Past Surgical History:   Procedure Laterality Date      SECTION      S/P CSECT X 3     ENDOSCOPIC STRIPPING VEIN(S)  ,02    S/P VEIN STRIPPING      TUBAL LIGATION      with 3rd      ZZ COLONOSCOPY THRU STOMA, DIAGNOSTIC  2009     [place] - [place] in [place]        Family History:  Family History   Problem Relation Age of Onset     Neurologic Disorder Father         ALS     Hypothyroidism Father      Glaucoma Father         glaucoma     Thyroid Disease Father      Osteoporosis Mother         on Fosamax      Uterine Cancer Mother 78        endometrial cancer s/p hysterectomy at age 80 - still alive      Esophageal Cancer Maternal Uncle      Esophageal Cancer Maternal Uncle      Hearing Loss Brother       No Known Problems Son      No Known Problems Daughter      No Known Problems Son      Hearing Loss Brother      Hearing Loss Brother      C.A.D. Maternal Grandfather          age 62     Diabetes Paternal Uncle      Cerebrovascular Disease Maternal Grandmother          age 70      No Known Problems Paternal Grandmother      No Known Problems Paternal Grandfather        Social History:  Social History     Tobacco Use     Smoking status: Former Smoker     Packs/day: 0.00     Years: 2.00     Pack years: 0.00     Types: Cigarettes     Start date: 1978     Quit date: 1980     Years since quittin.1     Smokeless tobacco: Never Used     Tobacco comment: only smoked a couple of years   Substance Use Topics     Alcohol use: Yes     Alcohol/week: 0.0 standard drinks     Comment: 2-4 drinks/month          Review Of Systems:  Skin: rosacea, red spot above lip  Eyes: negative  Ears/Nose/Throat: negative  Respiratory: No shortness of breath, dyspnea on exertion, cough, or hemoptysis  Cardiovascular: negative  Gastrointestinal: negative  Genitourinary: negative  Musculoskeletal: negative  Neurologic: negative  Psychiatric: negative  Hematologic/Lymphatic/Immunologic: negative  Endocrine: negative      Objective:     LMP 2009   Eyes: Conjunctivae/lids: Normal   ENT: Lips:  Normal  MSK: Normal  Cardiovascular: Peripheral edema none  Pulm: Breathing Normal  Neuro/Psych: Orientation: Normal; Mood/Affect: Normal, NAD, WDWN  Pt accompanied by: self  Following areas examined: Scalp, face, eyelids, lips, neck  Mata skin type: I   Findings:    Tan WD smooth macules on face.               Again, thank you for allowing me to participate in the care of your patient.        Sincerely,        Carolyn Contreras PA-C

## 2022-02-17 PROBLEM — K21.9 GASTROESOPHAGEAL REFLUX DISEASE: Status: ACTIVE | Noted: 2017-09-05

## 2022-06-25 ENCOUNTER — HEALTH MAINTENANCE LETTER (OUTPATIENT)
Age: 62
End: 2022-06-25

## 2022-07-18 ENCOUNTER — HOSPITAL ENCOUNTER (OUTPATIENT)
Dept: MAMMOGRAPHY | Facility: CLINIC | Age: 62
Discharge: HOME | End: 2022-07-18
Attending: FAMILY MEDICINE | Admitting: FAMILY MEDICINE
Payer: COMMERCIAL

## 2022-07-18 DIAGNOSIS — Z12.31 VISIT FOR SCREENING MAMMOGRAM: ICD-10-CM

## 2022-07-18 PROCEDURE — 77067 SCR MAMMO BI INCL CAD: CPT

## 2022-07-19 NOTE — RESULT ENCOUNTER NOTE
Alfonzo  Here are your recent results.  They are normal.  If you have any questions please do not hesitate to contact our office via phone ([phone]) or MyChart.    Lizzy Marr MBA, MS, PA-C  [facility]

## 2022-08-03 ENCOUNTER — OFFICE VISIT (OUTPATIENT)
Dept: FAMILY MEDICINE | Facility: CLINIC | Age: 62
End: 2022-08-03
Payer: COMMERCIAL

## 2022-08-03 DIAGNOSIS — L82.1 SEBORRHEIC KERATOSES: ICD-10-CM

## 2022-08-03 DIAGNOSIS — D23.9 DERMATOFIBROMA: ICD-10-CM

## 2022-08-03 DIAGNOSIS — L28.0 LICHENIFICATION: ICD-10-CM

## 2022-08-03 DIAGNOSIS — L71.9 ACNE ROSACEA: Primary | ICD-10-CM

## 2022-08-03 PROCEDURE — 99213 OFFICE O/P EST LOW 20 MIN: CPT | Performed by: NURSE PRACTITIONER

## 2022-08-03 ASSESSMENT — PAIN SCALES - GENERAL: PAINLEVEL: MILD PAIN (2)

## 2022-08-03 NOTE — PROGRESS NOTES
ED Attending Physician Note      Patient : Ifeoma Barrera Age: 65 year old Sex: male   MRN: [id number] Encounter Date: 3/20/2021      History     Chief Complaint   Patient presents with   • Leg Pain     Pt reports \"my legs have been swollen for two weeks and today I can't walk on them. Dr said it was gout.\"      HPI    65-year-old male with a history of hypertension, gout presenting for bilateral foot pain, worse on the left.  Patient states that his doctor told him that it was gout but his symptoms have been worsening and he has been taking naproxen at home without relief.  Patient states that the pain is primarily in the middle aspect of his foot, denies any ankle pain or pain with movement of his toes.  Patient states the pain is worse in the left foot than the right.  Patient denies any fevers.  Denies any chills.  Denies any nausea, vomiting or diarrhea.  Denies any leg pain or swelling.  States his symptoms are localized to the feet.  Patient states he has had these symptoms in the past.  Denies any chest pain/pressure, shortness of breath.  Denies any falls or injury.    No Known Allergies    There are no discharge medications for this patient.      There are no discharge medications for this patient.      Past Medical History:   Diagnosis Date   • Essential (primary) hypertension    • Gout 03/20/2021   • Prostate atrophy        No past surgical history on file.    No family history on file.    Social History     Tobacco Use   • Smoking status: Not on file   Substance Use Topics   • Alcohol use: Not Currently   • Drug use: Never       Review of Systems  A 10 point review of systems has been reviewed and is negative expect as otherwise noted in the HPI.      Physical Exam     ED Triage Vitals   ED Triage Vitals Group      Temp 03/20/21 0956 97.3 °F (36.3 °C)      Heart Rate 03/20/21 0919 86      Resp 03/20/21 0919 20      BP 03/20/21 0919 134/90      SpO2 03/20/21 0919 94 %      EtCO2 mmHg --       Height --      [facility] Dermatology Note  Encounter Date: Aug 3, 2022  Office Visit     Reviewed patients past medical history and pertinent chart review prior to patients visit today.     Dermatology Problem List:  Acne rosacea, using metronidazole cream twice daily    ____________________________________________    Assessment & Plan:     #Acne rosacea, patient likes the metronidazole but says the lotion is expensive.  I sent refills of metronidazole cream to the [facility] pharmacy and gave her good Rx coupon code for discount.    # Seborrheic Keratosis. Benign, no further treatment needed.     # Dermatofibroma, right posterior calf. benign, no further treatment needed.    #Lichenification overlying the knuckles of the right hand.  Likely due to her rubbing at these joints chronically painful due to arthritis.  Patient agrees.  Okay to use urea 20% cream for hand lotion with some alphahydroxy acids or lactic acids.  Also can do nothing.       Damaris Sultana, APRN CNP on 8/3/2022 at 12:33 PM   _______________________________________    CC: Derm Problem (Bumps on chest, itchy and then crusts over and remains crusted./Discoloration to left lower chest/Spot on back of right knee)    HPI:  Ms. Alfonzo Cho is a(n) 62 year old female who presents today as a return patient for spots of concern.  She has noticed some discolored and slightly raised spots on the chest,.  She also has some thickened skin over some of the knuckles that have arthritis of the right hand.  She wonders why this is happening.  Its not bothersome to her she just wants to make sure it is okay.  She also would like refills of metronidazole as she has a lotion but it is costing her quite a bit.  She heard that the other formulations will be cheaper.  We looked at her ask coupons and found the cheapest to be metronidazole cream from [facility].    Patient is otherwise feeling well, without additional skin concerns.      Physical Exam:  SKIN:    Weight --       Weight Scale Used --       BMI (Calculated) --       IBW/kg (Calculated) --        Physical Exam  Constitutional:       Appearance: Normal appearance.   HENT:      Head: Normocephalic and atraumatic.      Nose: Nose normal.      Mouth/Throat:      Mouth: Mucous membranes are moist.   Eyes:      Conjunctiva/sclera: Conjunctivae normal.   Neck:      Musculoskeletal: Neck supple.   Cardiovascular:      Rate and Rhythm: Normal rate and regular rhythm.      Pulses: Normal pulses.      Heart sounds: Normal heart sounds.   Pulmonary:      Effort: Pulmonary effort is normal.      Breath sounds: Normal breath sounds.   Abdominal:      General: Abdomen is flat.      Palpations: Abdomen is soft.   Musculoskeletal:         General: No deformity.      Comments: Mild swelling bilateral feet without erythema or warmth, dp and pt pulses palpable left foot, dopplerable left foot, feet are warm, toes with cap refill <2, tenderness over the dorsal aspect of the left foot without bruising or deformity. Right foot with mild dorsal tenderness. No calf tenderness or swelling. Active ROM MCP joints and ankle joint without localized swelling or redness.   Skin:     General: Skin is warm and dry.      Capillary Refill: Capillary refill takes less than 2 seconds.   Neurological:      General: No focal deficit present.      Mental Status: He is alert and oriented to person, place, and time.   Psychiatric:         Mood and Affect: Mood normal.         Behavior: Behavior normal.         Thought Content: Thought content normal.         ED Course     Procedures    Lab Results     Results for orders placed or performed during the hospital encounter of 03/20/21   Comprehensive Metabolic Panel   Result Value Ref Range    Fasting Status      Sodium 134 (L) 135 - 145 mmol/L    Potassium 3.8 3.4 - 5.1 mmol/L    Chloride 101 98 - 107 mmol/L    Carbon Dioxide 27 21 - 32 mmol/L    Anion Gap 10 10 - 20 mmol/L    Glucose 137 (H) 65 - 99  Focused examination of lesions of concern on the chest, upper back, left medial thigh, hands, and right posterior calf was performed.  - right posterior calf, 3 mm pink firm papule with positive dimples sign and stellate center  -left medial thigh, tan cerebriform slightly raised papule  -chest and upper back has a few thin tan cerebriform papules  -right 2nd, 3rd, and 5th MCP joints have some lichenification    - No other lesions of concern on areas examined.     Medications:  Current Outpatient Medications   Medication     metroNIDAZOLE (METROLOTION) 0.75 % external lotion     cholecalciferol (VITAMIN D3) 25 mcg (1000 units) capsule     estradiol (ESTRACE) 0.1 MG/GM vaginal cream     fluticasone (FLONASE) 50 MCG/ACT nasal spray     magnesium 250 MG tablet     psyllium (METAMUCIL/KONSYL) capsule     No current facility-administered medications for this visit.      Past Medical History:   Patient Active Problem List   Diagnosis     Pain in joint, pelvic region and thigh     Seasonal allergic rhinitis     Symptomatic menopausal or female climacteric states     Sleep apnea     history of Thrombophlebitis leg (H)     Plantar fasciitis     Seborrheic dermatitis     Infrapatellar bursitis     Low blood pressure- usual - asymptomatic- 90/60 = baseline     Family history of hypothyroidism- father     Memory difficulties- some short-term, worse at work     Aortic calcification (H)     Nonrheumatic aortic valve insufficiency - mild - no symptoms.      BMI 28.0-28.9,adult     Asymptomatic varicose veins     Gastroesophageal reflux disease, esophagitis presence not specified     Inadequate sleep hygiene     Past Medical History:   Diagnosis Date     Aortic calcification (H) 4/22/2016     Asymptomatic varicose veins      Contact dermatitis and other eczema, due to unspecified cause     seasonally     Infrapatellar bursitis      Nonrheumatic aortic valve insufficiency - mild - no symptoms.     incidental finding on thoracic XR      Papanicolaou smear of cervix with atypical squamous cells of undetermined significance (ASC-US) 07/01/16    Neg HR HPV     Phlebitis and thrombophlebitis of femoral vein (deep) (superficial) (H) not candidate for HRT     popliteal after progesterone oral therapy - no other clots      Sleep apnea     now on CPAP        CC Referred Self, MD  No address on file on close of this encounter.    mg/dL    BUN 24 (H) 6 - 20 mg/dL    Creatinine 0.92 0.67 - 1.17 mg/dL    Glomerular Filtration Rate 86 (L) >90 mL/min/1.73m2    BUN/ Creatinine Ratio 26 (H) 7 - 25    Calcium 9.0 8.4 - 10.2 mg/dL    Bilirubin, Total 0.6 0.2 - 1.0 mg/dL    GOT/AST 15 <=37 Units/L    GPT/ALT 25 <64 Units/L    Alkaline Phosphatase 105 45 - 117 Units/L    Albumin 3.7 3.6 - 5.1 g/dL    Protein, Total 7.6 6.4 - 8.2 g/dL    Globulin 3.9 2.0 - 4.0 g/dL    A/G Ratio 0.9 (L) 1.0 - 2.4   CBC with Automated Differential (performable only)   Result Value Ref Range    WBC 6.9 4.2 - 11.0 K/mcL    RBC 5.97 (H) 4.50 - 5.90 mil/mcL    HGB 16.8 13.0 - 17.0 g/dL    HCT 50.0 39.0 - 51.0 %    MCV 83.8 78.0 - 100.0 fl    MCH 28.1 26.0 - 34.0 pg    MCHC 33.6 32.0 - 36.5 g/dL    RDW-CV 13.6 11.0 - 15.0 %    RDW-SD 41.2 39.0 - 50.0 fL     140 - 450 K/mcL    NRBC 0 <=0 /100 WBC    Neutrophil, Percent 64 %    Lymphocytes, Percent 21 %    Mono, Percent 10 %    Eosinophils, Percent 3 %    Basophils, Percent 1 %    Immature Granulocytes 1 %    Absolute Neutrophils 4.4 1.8 - 7.7 K/mcL    Absolute Lymphocytes 1.5 1.0 - 4.0 K/mcL    Absolute Monocytes 0.7 0.3 - 0.9 K/mcL    Absolute Eosinophils  0.2 0.0 - 0.5 K/mcL    Absolute Basophils 0.1 0.0 - 0.3 K/mcL    Absolute Immmature Granulocytes 0.1 0.0 - 0.2 K/mcL   Sedimentation Rate [facility]   Result Value Ref Range    RBC Sedimentation Rate 32 (H) 0 - 20 mm/hr   C Reactive Protein   Result Value Ref Range    C-Reactive Protein 4.1 (H) <=1.0 mg/dL       EKG Results         Radiology Results     Imaging Results          XR FOOT MIN 3 VIEWS BILATERAL (Final result)  Result time 03/20/21 11:26:54   Procedure changed from XR FOOT MIN 3 VIEWS LEFT     Final result                 Impression:    FINDINGS/IMPRESSION:      Three views of bilateral feet were submitted.  There are degenerative  changes in right 1st metatarsophalangeal joint and cortical irregularity in  the lateral sesamoid of the 1st right metatarsal,  which could be  degenerative although osteomyelitis is not excluded.  Further evaluation  with MRI of the right foot is recommended.    There is bilateral soft tissue edema.  Small bilateral plantar calcaneal  enthesophytes.  There is no acute fracture or dislocation.  Vascular  calcifications.    Electronically Signed by: ANASTASIYA LOYOLA M.D.   Signed on: 3/20/2021 11:26 AM                Narrative:      DATE OF EXAM: 3/20/2021 11:08 AM    EXAMINATION: XR FOOT MIN 3 VIEWS BILATERAL    HISTORY:  Pain, diabetic    COMPARISON: RIGHT [facility] RADIOGRAPHS 12/17/2017                                ED Medication Orders (From admission, onward)    Ordered Start     Status Ordering Provider    03/20/21 1041 03/20/21 1042  HYDROcodone-acetaminophen (NORCO) 5-325 MG per tablet 1 tablet  ONCE      Last MAR action: Given WILLY GUNTER A               MDM     65-year-old male presenting for bilateral foot pain and swelling left greater than right.  Vital signs on arrival are within normal limits, patient is afebrile.  Patient is having significant pain with ambulation, has normal pulses and movement in his feet, he is concerned about gout but he does not have any swelling of his MCP joints or ankle joints and is able to move them through full range of motion stable lower suspicion for gout.  No notable erythema or warmth, patient does have mild swelling with tenderness over the dorsal aspect of bilateral feet left greater than right.  Plan to get x-rays for further evaluation along with basic labs.    There is degenerative disease, however, radiology read the right x-ray is possible osteomyelitis, I think this is unlikely, patient is relatively low risk, does not have a history of diabetes, does not have any wounds on the foot, however, plan to admit for MRI, podiatry consult and pain control.  She is comfortable with this plan, he is still having significant pain with attempted ambulation.    Clinical Impression     ED Diagnosis    1. Pain in both feet         Disposition        Admit 3/20/2021  1:41 PM  Telemetry Bed?: No  Patient Class: Observation [4]  Level of Care: Acute [1]  Admission Diagnosis: Foot pain [[id number]]  Admitting Physician: KAMRAN SPRINGER [[id number]]  Transferring Patient to? Only adjust for transfers [facility] [[id number]]  Is this a telephone or verbal order?: This is a telephone order from the admitting physician      Isela Remy MD   3/20/2021 10:42 AM                      Isela Remy MD  03/20/21 5799

## 2022-08-03 NOTE — LETTER
8/3/2022         RE: Alfonzo Cho  [id number] [address] [id number]        Dear Colleague,    Thank you for referring your patient, Alfonzo Cho, to the [facility]. Please see a copy of my visit note below.    [facility] Dermatology Note  Encounter Date: Aug 3, 2022  Office Visit     Reviewed patients past medical history and pertinent chart review prior to patients visit today.     Dermatology Problem List:  Acne rosacea, using metronidazole cream twice daily    ____________________________________________    Assessment & Plan:     #Acne rosacea, patient likes the metronidazole but says the lotion is expensive.  I sent refills of metronidazole cream to the [facility] pharmacy and gave her good Rx coupon code for discount.    # Seborrheic Keratosis. Benign, no further treatment needed.     # Dermatofibroma, right posterior calf. benign, no further treatment needed.    #Lichenification overlying the knuckles of the right hand.  Likely due to her rubbing at these joints chronically painful due to arthritis.  Patient agrees.  Okay to use urea 20% cream for hand lotion with some alphahydroxy acids or lactic acids.  Also can do nothing.       Damaris Sultana, APRN CNP on 8/3/2022 at 12:33 PM   _______________________________________    CC: Derm Problem (Bumps on chest, itchy and then crusts over and remains crusted./Discoloration to left lower chest/Spot on back of right knee)    HPI:  Ms. Alfonzo Cho is a(n) 62 year old female who presents today as a return patient for spots of concern.  She has noticed some discolored and slightly raised spots on the chest,.  She also has some thickened skin over some of the knuckles that have arthritis of the right hand.  She wonders why this is happening.  Its not bothersome to her she just wants to make sure it is okay.  She also would like refills of metronidazole as she has a lotion but it is costing her quite a  bit.  She heard that the other formulations will be cheaper.  We looked at her ask coupons and found the cheapest to be metronidazole cream from [facility].    Patient is otherwise feeling well, without additional skin concerns.      Physical Exam:  SKIN: Focused examination of lesions of concern on the chest, upper back, left medial thigh, hands, and right posterior calf was performed.  - right posterior calf, 3 mm pink firm papule with positive dimples sign and stellate center  -left medial thigh, tan cerebriform slightly raised papule  -chest and upper back has a few thin tan cerebriform papules  -right 2nd, 3rd, and 5th MCP joints have some lichenification    - No other lesions of concern on areas examined.     Medications:  Current Outpatient Medications   Medication     metroNIDAZOLE (METROLOTION) 0.75 % external lotion     cholecalciferol (VITAMIN D3) 25 mcg (1000 units) capsule     estradiol (ESTRACE) 0.1 MG/GM vaginal cream     fluticasone (FLONASE) 50 MCG/ACT nasal spray     magnesium 250 MG tablet     psyllium (METAMUCIL/KONSYL) capsule     No current facility-administered medications for this visit.      Past Medical History:   Patient Active Problem List   Diagnosis     Pain in joint, pelvic region and thigh     Seasonal allergic rhinitis     Symptomatic menopausal or female climacteric states     Sleep apnea     history of Thrombophlebitis leg (H)     Plantar fasciitis     Seborrheic dermatitis     Infrapatellar bursitis     Low blood pressure- usual - asymptomatic- 90/60 = baseline     Family history of hypothyroidism- father     Memory difficulties- some short-term, worse at work     Aortic calcification (H)     Nonrheumatic aortic valve insufficiency - mild - no symptoms.      BMI 28.0-28.9,adult     Asymptomatic varicose veins     Gastroesophageal reflux disease, esophagitis presence not specified     Inadequate sleep hygiene     Past Medical History:   Diagnosis Date     Aortic calcification (H)  4/22/2016     Asymptomatic varicose veins      Contact dermatitis and other eczema, due to unspecified cause     seasonally     Infrapatellar bursitis      Nonrheumatic aortic valve insufficiency - mild - no symptoms.     incidental finding on thoracic XR     Papanicolaou smear of cervix with atypical squamous cells of undetermined significance (ASC-US) 07/01/16    Neg HR HPV     Phlebitis and thrombophlebitis of femoral vein (deep) (superficial) (H) not candidate for HRT     popliteal after progesterone oral therapy - no other clots      Sleep apnea     now on CPAP        CC Referred Self, MD  No address on file on close of this encounter.       Again, thank you for allowing me to participate in the care of your patient.        Sincerely,        LORENZO Nagel CNP

## 2022-09-30 ASSESSMENT — SLEEP AND FATIGUE QUESTIONNAIRES
HOW LIKELY ARE YOU TO NOD OFF OR FALL ASLEEP WHILE SITTING AND TALKING TO SOMEONE: WOULD NEVER DOZE
HOW LIKELY ARE YOU TO NOD OFF OR FALL ASLEEP WHILE SITTING QUIETLY AFTER LUNCH WITHOUT ALCOHOL: SLIGHT CHANCE OF DOZING
HOW LIKELY ARE YOU TO NOD OFF OR FALL ASLEEP IN A CAR, WHILE STOPPED FOR A FEW MINUTES IN TRAFFIC: WOULD NEVER DOZE
HOW LIKELY ARE YOU TO NOD OFF OR FALL ASLEEP WHILE LYING DOWN TO REST IN THE AFTERNOON WHEN CIRCUMSTANCES PERMIT: SLIGHT CHANCE OF DOZING
HOW LIKELY ARE YOU TO NOD OFF OR FALL ASLEEP WHILE SITTING AND READING: SLIGHT CHANCE OF DOZING
HOW LIKELY ARE YOU TO NOD OFF OR FALL ASLEEP WHEN YOU ARE A PASSENGER IN A CAR FOR AN HOUR WITHOUT A BREAK: SLIGHT CHANCE OF DOZING
HOW LIKELY ARE YOU TO NOD OFF OR FALL ASLEEP WHILE WATCHING TV: SLIGHT CHANCE OF DOZING
HOW LIKELY ARE YOU TO NOD OFF OR FALL ASLEEP WHILE SITTING INACTIVE IN A PUBLIC PLACE: WOULD NEVER DOZE

## 2022-10-05 ENCOUNTER — ALLIED HEALTH/NURSE VISIT (OUTPATIENT)
Dept: FAMILY MEDICINE | Facility: CLINIC | Age: 62
End: 2022-10-05
Payer: COMMERCIAL

## 2022-10-05 DIAGNOSIS — Z23 ENCOUNTER FOR ADMINISTRATION OF VACCINE: Primary | ICD-10-CM

## 2022-10-05 PROCEDURE — 99207 PR NO CHARGE NURSE ONLY: CPT

## 2022-10-05 PROCEDURE — 90715 TDAP VACCINE 7 YRS/> IM: CPT

## 2022-10-05 PROCEDURE — 90471 IMMUNIZATION ADMIN: CPT

## 2022-10-06 ENCOUNTER — OFFICE VISIT (OUTPATIENT)
Dept: SLEEP MEDICINE | Facility: CLINIC | Age: 62
End: 2022-10-06
Payer: COMMERCIAL

## 2022-10-06 VITALS
BODY MASS INDEX: 24.5 KG/M2 | OXYGEN SATURATION: 100 % | DIASTOLIC BLOOD PRESSURE: 67 MMHG | SYSTOLIC BLOOD PRESSURE: 104 MMHG | HEART RATE: 62 BPM | HEIGHT: 71 IN | WEIGHT: 175 LBS

## 2022-10-06 DIAGNOSIS — G47.33 OSA (OBSTRUCTIVE SLEEP APNEA): Primary | ICD-10-CM

## 2022-10-06 PROCEDURE — 99213 OFFICE O/P EST LOW 20 MIN: CPT | Performed by: INTERNAL MEDICINE

## 2022-10-06 NOTE — PATIENT INSTRUCTIONS
Your Body mass index is 24.41 kg/m .  Weight management is a personal decision.  If you are interested in exploring weight loss strategies, the following discussion covers the approaches that may be successful. Body mass index (BMI) is one way to tell whether you are at a healthy weight, overweight, or obese. It measures your weight in relation to your height.  A BMI of 18.5 to 24.9 is in the healthy range. A person with a BMI of 25 to 29.9 is considered overweight, and someone with a BMI of 30 or greater is considered obese. More than two-thirds of American adults are considered overweight or obese.  Being overweight or obese increases the risk for further weight gain. Excess weight may lead to heart disease and diabetes.  Creating and following plans for healthy eating and physical activity may help you improve your health.  Weight control is part of healthy lifestyle and includes exercise, emotional health, and healthy eating habits. Careful eating habits lifelong are the mainstay of weight control. Though there are significant health benefits from weight loss, long-term weight loss with diet alone may be very difficult to achieve- studies show long-term success with dietary management in less than 10% of people. Attaining a healthy weight may be especially difficult to achieve in those with severe obesity. In some cases, medications, devices and surgical management might be considered.  What can you do?  If you are overweight or obese and are interested in methods for weight loss, you should discuss this with your provider.     Consider reducing daily calorie intake by 500 calories.     Keep a food journal.     Avoiding skipping meals, consider cutting portions instead.    Diet combined with exercise helps maintain muscle while optimizing fat loss. Strength training is particularly important for building and maintaining muscle mass. Exercise helps reduce stress, increase energy, and improves fitness.  Increasing exercise without diet control, however, may not burn enough calories to loose weight.       Start walking three days a week 10-20 minutes at a time    Work towards walking thirty minutes five days a week     Eventually, increase the speed of your walking for 1-2 minutes at time    In addition, we recommend that you review healthy lifestyles and methods for weight loss available through the National Institutes of Health patient information sites:  http://win.niddk.nih.gov/publications/index.htm    And look into health and wellness programs that may be available through your health insurance provider, employer, local community center, or sandi club.

## 2022-10-06 NOTE — NURSING NOTE
"Chief Complaint   Patient presents with     CPAP Follow Up     New machine and supplies        Initial /67   Pulse 62   Ht 1.803 m (5' 11\")   Wt 79.4 kg (175 lb)   LMP 05/01/2009   SpO2 100%   BMI 24.41 kg/m   Estimated body mass index is 24.41 kg/m  as calculated from the following:    Height as of this encounter: 1.803 m (5' 11\").    Weight as of this encounter: 79.4 kg (175 lb).    Medication Reconciliation: complete    ESS:5    [facility] CNA      "

## 2022-10-06 NOTE — PROGRESS NOTES
Obstructive Sleep Apnea - PAP Follow-Up Visit:    Chief Complaint   Patient presents with     CPAP Follow Up     New machine and supplies        Alfonzo Cho comes in today for follow-up of their mild sleep apnea, managed with CPAP.     Patient was originally diagnosed with mild obstructive sleep apnea in 2008 with an apnea hypopnea index of 7 per hour.       11/29/2019 [place] Titration Sleep Study (165.0 lbs) - Treatment was titrated to a pressure of CPAP 6 with an AHI of 0.3. Time Spent in REM supine at this pressure was 63.5.    Do you use a CPAP Machine at home: Yes  Overall, on a scale of 0-10 how would you rate your CPAP (0 poor, 10 great): 7  Is your mask comfortable: Yes  If not, why:    Is you mask leaking: No  If yes, where do you feel it:    How many night per week does the mask leak (0-7):    Do you notice snoring with mask on: No  Do you notice gasping arousals with mask on: No  Are you having significant oral or nasal dryness: No  Is the pressure setting comfortable: Yes  In not, why:    What type of mask do you use: Nasal Pillow  What is your typical bedtime: 10 pm-fernanda  How long does it take you to go to sleep on PAP therapy: usually less than 5 minutes  What time do you typically get out of bed for the day: 6 am  How many hours on average per night are you using PAP therapy: 5-6  How many hours are you sleeping per night: 8  Do you feel well rested in the morning: Yes     Respironics    Auto-PAP 7-10 cmH2O download:  28/30 total days of use. 2 nonuse days.  Average use 5 hours 46 minutes per day.  86.7% days with >4 hours use.  Large leak 0 secs per day average. CPAP 90% pressure 8.9cm. AHI 0.9        EPWORTH SLEEPINESS SCALE      Berlin Sleepiness Scale ( MLEESA Lemus  3520-0043<br>ESS - [place]/English - Final version - 21 Nov 07 - [facility] [place].) 9/30/2022   Sitting and reading Slight chance of dozing   Watching TV Slight chance of dozing   Sitting, inactive in a public place (e.g. a  "theatre or a meeting) Would never doze   As a passenger in a car for an hour without a break Slight chance of dozing   Lying down to rest in the afternoon when circumstances permit Slight chance of dozing   Sitting and talking to someone Would never doze   Sitting quietly after a lunch without alcohol Slight chance of dozing   In a car, while stopped for a few minutes in traffic Would never doze   Rutland Score (MC) 5   Rutland Score (Sleep) 5       INSOMNIA SEVERITY INDEX (ESTHER)      Insomnia Severity Index (ESTHER) 9/30/2022   Difficulty falling asleep 0   Difficulty staying asleep 1   Problems waking up too early 1   How SATISFIED/DISSATISFIED are you with your CURRENT sleep pattern? 2   How NOTICEABLE to others do you think your sleep problem is in terms of impairing the quality of your life? 0   How WORRIED/DISTRESSED are you about your current sleep problem? 0   To what extent do you consider your sleep problem to INTERFERE with your daily functioning (e.g. daytime fatigue, mood, ability to function at work/daily chores, concentration, memory, mood, etc.) CURRENTLY? 1   ESTHER Total Score 5       Guidelines for Scoring/Interpretation:  Total score categories:  0-7 = No clinically significant insomnia   8-14 = Subthreshold insomnia   15-21 = Clinical insomnia (moderate severity)  22-28 = Clinical insomnia (severe)  Used via courtesy of www.[employer].va.gov with permission from Aleksander Victor PhD., [facility]    /67   Pulse 62   Ht 1.803 m (5' 11\")   Wt 79.4 kg (175 lb)   LMP 05/01/2009   SpO2 100%   BMI 24.41 kg/m      Impression/Plan:     Mild sleep apnea.     -Patient is regularly using CPAP and benefiting from therapy.  Review of download data from her device shows regular compliance and normal residual AHI, confirming adequate treatment of sleep apnea.    Plan:     -Continue auto titrating CPAP therapy with a pressure range of 7 to 10 cm H2O    Alfonzo Cho will follow up in about 1 year(s). "       Andres Romero MD    CC:  Kristal Peña

## 2022-11-01 ENCOUNTER — OFFICE VISIT (OUTPATIENT)
Dept: FAMILY MEDICINE | Facility: CLINIC | Age: 62
End: 2022-11-01
Payer: COMMERCIAL

## 2022-11-01 VITALS
HEIGHT: 71 IN | HEART RATE: 78 BPM | RESPIRATION RATE: 18 BRPM | SYSTOLIC BLOOD PRESSURE: 104 MMHG | OXYGEN SATURATION: 100 % | TEMPERATURE: 97.8 F | WEIGHT: 174 LBS | DIASTOLIC BLOOD PRESSURE: 78 MMHG | BODY MASS INDEX: 24.36 KG/M2

## 2022-11-01 DIAGNOSIS — Z83.49 FAMILY HISTORY OF HYPOTHYROIDISM: ICD-10-CM

## 2022-11-01 DIAGNOSIS — I70.0 AORTIC CALCIFICATION (H): ICD-10-CM

## 2022-11-01 DIAGNOSIS — Z13.220 SCREENING FOR HYPERLIPIDEMIA: ICD-10-CM

## 2022-11-01 DIAGNOSIS — Z12.11 SCREEN FOR COLON CANCER: ICD-10-CM

## 2022-11-01 DIAGNOSIS — Z00.00 ROUTINE GENERAL MEDICAL EXAMINATION AT A HEALTH CARE FACILITY: Primary | ICD-10-CM

## 2022-11-01 DIAGNOSIS — Z13.1 SCREENING FOR DIABETES MELLITUS: ICD-10-CM

## 2022-11-01 LAB
ALBUMIN SERPL-MCNC: 4 G/DL (ref 3.4–5)
ALP SERPL-CCNC: 71 U/L (ref 40–150)
ALT SERPL W P-5'-P-CCNC: 33 U/L (ref 0–50)
ANION GAP SERPL CALCULATED.3IONS-SCNC: 3 MMOL/L (ref 3–14)
AST SERPL W P-5'-P-CCNC: 21 U/L (ref 0–45)
BILIRUB SERPL-MCNC: 0.6 MG/DL (ref 0.2–1.3)
BUN SERPL-MCNC: 14 MG/DL (ref 7–30)
CALCIUM SERPL-MCNC: 9.3 MG/DL (ref 8.5–10.1)
CHLORIDE BLD-SCNC: 106 MMOL/L (ref 94–109)
CHOLEST SERPL-MCNC: 218 MG/DL
CO2 SERPL-SCNC: 31 MMOL/L (ref 20–32)
CREAT SERPL-MCNC: 0.64 MG/DL (ref 0.52–1.04)
ERYTHROCYTE [DISTWIDTH] IN BLOOD BY AUTOMATED COUNT: 12.6 % (ref 10–15)
FASTING STATUS PATIENT QL REPORTED: YES
GFR SERPL CREATININE-BSD FRML MDRD: >90 ML/MIN/1.73M2
GLUCOSE BLD-MCNC: 93 MG/DL (ref 70–99)
HCT VFR BLD AUTO: 39.9 % (ref 35–47)
HDLC SERPL-MCNC: 82 MG/DL
HGB BLD-MCNC: 13.2 G/DL (ref 11.7–15.7)
LDLC SERPL CALC-MCNC: 121 MG/DL
MCH RBC QN AUTO: 30.7 PG (ref 26.5–33)
MCHC RBC AUTO-ENTMCNC: 33.1 G/DL (ref 31.5–36.5)
MCV RBC AUTO: 93 FL (ref 78–100)
NONHDLC SERPL-MCNC: 136 MG/DL
PLATELET # BLD AUTO: 214 10E3/UL (ref 150–450)
POTASSIUM BLD-SCNC: 4.1 MMOL/L (ref 3.4–5.3)
PROT SERPL-MCNC: 7 G/DL (ref 6.8–8.8)
RBC # BLD AUTO: 4.3 10E6/UL (ref 3.8–5.2)
SODIUM SERPL-SCNC: 140 MMOL/L (ref 133–144)
TRIGL SERPL-MCNC: 73 MG/DL
TSH SERPL DL<=0.005 MIU/L-ACNC: 2.09 MU/L (ref 0.4–4)
WBC # BLD AUTO: 4.6 10E3/UL (ref 4–11)

## 2022-11-01 PROCEDURE — 80053 COMPREHEN METABOLIC PANEL: CPT | Performed by: NURSE PRACTITIONER

## 2022-11-01 PROCEDURE — 99396 PREV VISIT EST AGE 40-64: CPT | Performed by: NURSE PRACTITIONER

## 2022-11-01 PROCEDURE — 84443 ASSAY THYROID STIM HORMONE: CPT | Performed by: NURSE PRACTITIONER

## 2022-11-01 PROCEDURE — 85027 COMPLETE CBC AUTOMATED: CPT | Performed by: NURSE PRACTITIONER

## 2022-11-01 PROCEDURE — 36415 COLL VENOUS BLD VENIPUNCTURE: CPT | Performed by: NURSE PRACTITIONER

## 2022-11-01 PROCEDURE — 80061 LIPID PANEL: CPT | Performed by: NURSE PRACTITIONER

## 2022-11-01 ASSESSMENT — ENCOUNTER SYMPTOMS
NERVOUS/ANXIOUS: 0
NAUSEA: 0
ARTHRALGIAS: 0
CONSTIPATION: 0
HEMATURIA: 0
CHILLS: 0
SHORTNESS OF BREATH: 0
WEAKNESS: 0
DIARRHEA: 0
MYALGIAS: 0
DIZZINESS: 0
HEADACHES: 0
FREQUENCY: 0
FEVER: 0
ABDOMINAL PAIN: 0
JOINT SWELLING: 0
PARESTHESIAS: 0
PALPITATIONS: 0
SORE THROAT: 0
EYE PAIN: 0
HEARTBURN: 0
COUGH: 0
DYSURIA: 0
HEMATOCHEZIA: 0

## 2022-11-01 NOTE — RESULT ENCOUNTER NOTE
Dear Alfonzo,     -Normal red blood cell (hgb) levels, normal white blood cell count and normal platelet levels.      Please send a [employer] message or call [phone]  if you have any questions.      LORENZO Dooley, [facility] [place]    If you have further questions about the interpretation of your labs, [email or web address] is a good website to check out for further information.

## 2022-11-01 NOTE — PROGRESS NOTES
"   SUBJECTIVE:   CC: Alfonzo is an 62 year old who presents for preventive health visit.       Patient has been advised of split billing requirements and indicates understanding: Yes  Healthy Habits:     Getting at least 3 servings of Calcium per day:  Yes    Bi-annual eye exam:  Yes    Dental care twice a year:  Yes    Sleep apnea or symptoms of sleep apnea:  Sleep apnea    Diet:  Regular (no restrictions)    Duration of exercise:  Other    Taking medications regularly:  Yes    Medication side effects:  Not applicable    PHQ-2 Total Score: 0    Additional concerns today:  No      Derm Concerns- possible keratosis - on chest, pal of hand and her back .    Bowel movement changes- narrow BM for about 10 days.  Its been about a month now since that happened. No diarrhea, stick and floating BM's.   Traveled in September - road trip, less fiber.  Was noticing urgent need for bowel movement, narrow BM's, no diarrhea.   Mostly resolved, but now noticing \"floaty\" bowel movement.  No associated abdominal pain.    Intermittent bloating.      Difficulty with 2 day prep for colon cancer, would like to continue FIT testing.      Recent Labs   Lab Test 03/30/21  1036 10/22/19  0938 09/05/17  0923 11/06/15  0954   CHOL 207* 184   < > 205*   HDL 81 63   < > 80   * 108*   < > 111   TRIG 67 66   < > 70   CHOLHDLRATIO  --   --   --  2.6    < > = values in this interval not displayed.   The 10-year ASCVD risk score (Reed TEMPLETON, et al., 2019) is: 2.9%    Values used to calculate the score:      Age: 62 years      Sex: Female      Is Non- : No      Diabetic: No      Tobacco smoker: No      Systolic Blood Pressure: 104 mmHg      Is BP treated: No      HDL Cholesterol: 81 mg/dL      Total Cholesterol: 207 mg/dL      Today's PHQ-2 Score:   PHQ-2 ( 1999 Pfizer) 11/1/2022   Q1: Little interest or pleasure in doing things 0   Q2: Feeling down, depressed or hopeless 0   PHQ-2 Score 0   PHQ-2 Total Score (12-17 Years)- " Positive if 3 or more points; Administer PHQ-A if positive -   Q1: Little interest or pleasure in doing things Not at all   Q2: Feeling down, depressed or hopeless Not at all   PHQ-2 Score 0       Abuse: Current or Past (Physical, Sexual or Emotional) - No  Do you feel safe in your environment? Yes        Social History     Tobacco Use     Smoking status: Former     Packs/day: 0.00     Years: 2.00     Pack years: 0.00     Types: Cigarettes     Start date: 1978     Quit date: 1980     Years since quittin.8     Smokeless tobacco: Never     Tobacco comments:     only smoked a couple of years   Substance Use Topics     Alcohol use: Yes     Alcohol/week: 0.0 standard drinks     Comment: 2-4 drinks/month     If you drink alcohol do you typically have >3 drinks per day or >7 drinks per week? No    Alcohol Use 2022   Prescreen: >3 drinks/day or >7 drinks/week? Not Applicable   Prescreen: >3 drinks/day or >7 drinks/week? -       Reviewed orders with patient.  Reviewed health maintenance and updated orders accordingly - Yes  BP Readings from Last 3 Encounters:   22 104/78   10/06/22 104/67   02/10/22 110/62    Wt Readings from Last 3 Encounters:   22 78.9 kg (174 lb)   10/06/22 79.4 kg (175 lb)   10/21/21 82.8 kg (182 lb 8 oz)                  Patient Active Problem List   Diagnosis     Pain in joint, pelvic region and thigh     Seasonal allergic rhinitis     Symptomatic menopausal or female climacteric states     Sleep apnea     history of Thrombophlebitis leg (H)     Plantar fasciitis     Seborrheic dermatitis     Infrapatellar bursitis     Low blood pressure- usual - asymptomatic- 90/60 = baseline     Family history of hypothyroidism- father     Memory difficulties- some short-term, worse at work     Aortic calcification (H)     Nonrheumatic aortic valve insufficiency - mild - no symptoms.      BMI 28.0-28.9,adult     Asymptomatic varicose veins     Gastroesophageal reflux disease, esophagitis  presence not specified     Inadequate sleep hygiene     Past Surgical History:   Procedure Laterality Date      SECTION      S/P CSECT X 3     ENDOSCOPIC STRIPPING VEIN(S)  92,02    S/P VEIN STRIPPING      TUBAL LIGATION      with 3rd      ZZHC COLONOSCOPY THRU STOMA, DIAGNOSTIC  2009     [place] [facility] - normal - repeat in 2019        Social History     Tobacco Use     Smoking status: Former     Packs/day: 0.00     Years: 2.00     Pack years: 0.00     Types: Cigarettes     Start date: 1978     Quit date: 1980     Years since quittin.8     Smokeless tobacco: Never     Tobacco comments:     only smoked a couple of years   Substance Use Topics     Alcohol use: Yes     Alcohol/week: 0.0 standard drinks     Comment: 2-4 drinks/month     Family History   Problem Relation Age of Onset     Neurologic Disorder Father         ALS     Hypothyroidism Father      Glaucoma Father         glaucoma     Thyroid Disease Father      Osteoporosis Mother         on Fosamax      Uterine Cancer Mother 78        endometrial cancer s/p hysterectomy at age 80 - still alive      Esophageal Cancer Maternal Uncle      Esophageal Cancer Maternal Uncle      Hearing Loss Brother      No Known Problems Son      No Known Problems Daughter      No Known Problems Son      Hearing Loss Brother      Hearing Loss Brother      C.A.D. Maternal Grandfather          age 62     Diabetes Paternal Uncle      Cerebrovascular Disease Maternal Grandmother          age 70      No Known Problems Paternal Grandmother      No Known Problems Paternal Grandfather          Current Outpatient Medications   Medication Sig Dispense Refill     cholecalciferol (VITAMIN D3) 25 mcg (1000 units) capsule        metroNIDAZOLE (METROCREAM) 0.75 % external cream Apply topically 2 times daily 45 g 11     metroNIDAZOLE (METROLOTION) 0.75 % external lotion Apply to face 2x a day. 60 mL 4     psyllium (METAMUCIL/KONSYL) capsule          Breast  Cancer Screening:    Breast CA Risk Assessment (FHS-7) 2021   Do you have a family history of breast, colon, or ovarian cancer? No / Unknown       Mammogram Screening: Recommended mammography every 1-2 years with patient discussion and risk factor consideration  Pertinent mammograms are reviewed under the imaging tab.    History of abnormal Pap smear: NO - age 30-65 PAP every 5 years with negative HPV co-testing recommended  PAP / HPV Latest Ref Rng & Units 10/22/2019 2016 2013   PAP (Historical) - NIL ASC-US(A) NIL   HPV16 NEG:Negative Negative Negative -   HPV18 NEG:Negative Negative Negative -   HRHPV NEG:Negative Negative Negative -     Reviewed and updated as needed this visit by clinical staff   Tobacco  Allergies  Meds              Reviewed and updated as needed this visit by Provider                 Past Medical History:   Diagnosis Date     Aortic calcification (H) 2016     Asymptomatic varicose veins      Contact dermatitis and other eczema, due to unspecified cause     seasonally     Infrapatellar bursitis      Nonrheumatic aortic valve insufficiency - mild - no symptoms.     incidental finding on thoracic XR     Papanicolaou smear of cervix with atypical squamous cells of undetermined significance (ASC-US) 16    Neg HR HPV     Phlebitis and thrombophlebitis of femoral vein (deep) (superficial) (H) not candidate for HRT     popliteal after progesterone oral therapy - no other clots      Sleep apnea     now on CPAP       Past Surgical History:   Procedure Laterality Date      SECTION      S/P CSECT X 3     ENDOSCOPIC STRIPPING VEIN(S)  92,02    S/P VEIN STRIPPING      TUBAL LIGATION      with 3rd      ZZ COLONOSCOPY THRU STOMA, DIAGNOSTIC  2009     [place] [facility] - normal - repeat in 2019        Review of Systems   Constitutional: Negative for chills and fever.   HENT: Negative for congestion, ear pain, hearing loss and sore throat.    Eyes: Negative for pain  "and visual disturbance.   Respiratory: Negative for cough and shortness of breath.    Cardiovascular: Negative for chest pain, palpitations and peripheral edema.   Gastrointestinal: Negative for abdominal pain, constipation, diarrhea, heartburn, hematochezia and nausea.   Genitourinary: Negative for dysuria, frequency, genital sores, hematuria and urgency.   Musculoskeletal: Negative for arthralgias, joint swelling and myalgias.   Skin: Negative for rash.   Neurological: Negative for dizziness, weakness, headaches and paresthesias.   Psychiatric/Behavioral: Negative for mood changes. The patient is not nervous/anxious.        OBJECTIVE:   /78   Pulse 78   Temp 97.8  F (36.6  C)   Resp 18   Ht 1.803 m (5' 11\")   Wt 78.9 kg (174 lb)   LMP 05/01/2009   SpO2 100%   BMI 24.27 kg/m       Physical Exam     GENERAL: healthy, alert and no distress  EYES: Eyes grossly normal to inspection, PERRL and conjunctivae and sclerae normal  HENT: ear canals and TM's normal, nose and mouth without ulcers or lesions  NECK: no adenopathy, no asymmetry, masses, or scars and thyroid normal to palpation  RESP: lungs clear to auscultation - no rales, rhonchi or wheezes  CV: regular rate and rhythm, normal S1 S2, no S3 or S4, no murmur, click or rub, no peripheral edema  ABDOMEN: soft, nontender, no hepatosplenomegaly, no masses and bowel sounds normal  MS: no gross musculoskeletal defects noted, no edema  SKIN: no suspicious lesions or rashes  NEURO: Normal strength and tone, mentation intact and speech normal  PSYCH: mentation appears normal, affect normal/bright      ASSESSMENT/PLAN:     Alfonzo was seen today for physical.    Diagnoses and all orders for this visit:    Routine general medical examination at a health care facility  -     CBC with platelets    Screening for hyperlipidemia  -     Lipid panel reflex to direct LDL Non-fasting    Screen for colon cancer  -     Fecal colorectal cancer screen FIT - Future " "(S+30)    Family history of hypothyroidism- father  -     TSH with free T4 reflex    Screening for diabetes mellitus  -     Comprehensive metabolic panel (BMP + Alb, Alk Phos, ALT, AST, Total. Bili, TP)    Aortic calcification (H)  Consultation with Dr. Resendiz on 5/5/21.    Questioned possible subclinical atherosclerosis due to presence of aortic calcification.    Continue with dietary and lifestyle modifications.      Other orders  -     REVIEW OF HEALTH MAINTENANCE PROTOCOL ORDERS            COUNSELING:  Reviewed preventive health counseling, as reflected in patient instructions    Estimated body mass index is 24.27 kg/m  as calculated from the following:    Height as of this encounter: 1.803 m (5' 11\").    Weight as of this encounter: 78.9 kg (174 lb).      She reports that she quit smoking about 42 years ago. Her smoking use included cigarettes. She started smoking about 44 years ago. She has never used smokeless tobacco.      Counseling Resources:  ATP IV Guidelines  Pooled Cohorts Equation Calculator  Breast Cancer Risk Calculator  BRCA-Related Cancer Risk Assessment: FHS-7 Tool  FRAX Risk Assessment  ICSI Preventive Guidelines  Dietary Guidelines for Americans, 2010  USDA's MyPlate  ASA Prophylaxis  Lung CA Screening    Alba Iraheta, LORENZO [facility]  "

## 2022-11-02 PROCEDURE — 82274 ASSAY TEST FOR BLOOD FECAL: CPT | Performed by: NURSE PRACTITIONER

## 2022-11-04 LAB — HEMOCCULT STL QL IA: NEGATIVE

## 2022-11-04 NOTE — RESULT ENCOUNTER NOTE
Deavasyl Mejía,     -Normal red blood cell (hgb) levels, normal white blood cell count and normal platelet levels.    -LDL(bad) cholesterol level is elevated which can increase your heart disease risk.  A diet high in fat and simple carbohydrates, genetics and being overweight can contribute to this. ADVISE: exercising 150 minutes of aerobic exercise per week (30 minutes for 5 days per week or 50 minutes for 3 days per week are options) and eating a low saturated fat/low carbohydrate diet are helpful to improve this. In 12 months, you should recheck your fasting cholesterol panel.      Your 10 year risk score for cardiovascular disease continues to be on the lower side.      The 10-year ASCVD risk score (Reed DK, et al., 2019) is: 2.9%    Values used to calculate the score:      Age: 62 years      Sex: Female      Is Non- : No      Diabetic: No      Tobacco smoker: No      Systolic Blood Pressure: 104 mmHg      Is BP treated: No      HDL Cholesterol: 82 mg/dL      Total Cholesterol: 218 mg/dL    -Liver and gallbladder tests are normal (ALT,AST, Alk phos, bilirubin), kidney function is normal (Cr, GFR), sodium is normal, potassium is normal, calcium is normal, glucose is normal.    -TSH (thyroid stimulating hormone) level is normal which indicates normal thyroid function.    -FIT test (screening test for colon cancer) was normal. ADVISE: rechecking this test in 1 year.      Please send a [employer] message or call [phone]  if you have any questions.      Alba Iraheta, LORENZO, CNP  [facility] [place]    If you have further questions about the interpretation of your labs, [email or web address] is a good website to check out for further information.

## 2022-12-26 ENCOUNTER — NURSE TRIAGE (OUTPATIENT)
Dept: FAMILY MEDICINE | Facility: CLINIC | Age: 62
End: 2022-12-26

## 2022-12-26 NOTE — TELEPHONE ENCOUNTER
"Call received from patient stating she has had some ongoing knee issues. States she has discussed this with Dr. Peña in the past and was referred to [facility]. Per chart this was in 2016. States she crossed her legs last night and she did something to her left knee and now the knee is very sore to the point that she \"can barely bear weight on it\". Patient is asking if she needs a primary care provider appointment or if she should receive another referral. Patient has also tried ice. Advised will send message to primary care provider but if significant pain patient may want to go to [facility] urgent care. Patient states Ibuprofen and Tylenol have offered some relief so she will wait for response from MD.     Reason for Disposition    Patient wants to be seen    Additional Information    Negative: Sounds like a life-threatening emergency to the triager    Negative: Followed a knee injury    Negative: Swollen knee joint and fever    Negative: Thigh or calf pain and only 1 side and present > 1 hour    Negative: Thigh or calf swelling and only 1 side    Negative: Patient sounds very sick or weak to the triager    Negative: Can't move swollen joint at all    Negative: SEVERE pain (e.g., excruciating, unable to walk)    Negative: Very swollen knee joint    Negative: Painful rash with multiple small blisters grouped together (i.e., dermatomal distribution or 'band' or 'stripe')    Negative: Looks like a boil, infected sore, deep ulcer, or other infected rash (spreading redness, pus)    Negative: MODERATE pain (e.g., symptoms interfere with work or school, limping) and present > 3 days    Negative: Swollen knee joint (no fever or redness)    Negative: Fluid-filled sack just below knee cap  (no fever or redness)    Negative: MILD knee pain persists > 7 days    Protocols used: KNEE PAIN-A-OH      "

## 2022-12-27 NOTE — TELEPHONE ENCOUNTER
Called #   Telephone Information:   Mobile [phone]     Advised pt on the information below     Patient stated an understanding she said she is doing better since she is taking ibuprofen and resting the knee, she said if it fails to improve she will go in but for now she will try home care remedies     Michelle Mello RN, BSN  [place] [facility]

## 2022-12-27 NOTE — TELEPHONE ENCOUNTER
Provider Response to 2nd Level Triage Request    I have reviewed the RN documentation. My recommendation is:  If pain and acute injury would recommend [facility] urgent care. They will be able to assess and get her in faster than referral will. Dr Peña is out of office this week

## 2023-02-16 ENCOUNTER — NURSE TRIAGE (OUTPATIENT)
Dept: FAMILY MEDICINE | Facility: CLINIC | Age: 63
End: 2023-02-16
Payer: COMMERCIAL

## 2023-02-16 NOTE — TELEPHONE ENCOUNTER
Called and spoke with patient.     Scheduled for in-person tomorrow afternoon with Alba. If symptoms worsen, patient will go to urgent care today.     DANIELLA YUAN RN on 2/16/2023 at 10:41 AM   [facility]

## 2023-02-16 NOTE — TELEPHONE ENCOUNTER
S-(situation): Abdominal pain    B-(background): Ate a salad  that had  2 days prior, pain began that night.  History of GERD.  Started Omega 3 fish oil with Vit D, E, A 2 wks ago per eye doctor.     A-(assessment): Developed upper abdominal pain 23 evening, pain is constant but varies in intensity.  Comes in quick jabs of pain.  At times pain is mid abdomen around umbilicus and this morning she felt as if she was having reflux symptoms with belching, intermittent nausea.  She denies fever, vomiting, radiation of pain into chest or back.  Feels better if she stands.  Pain is not affected by eating, she has not lost her appetite. Diarrhea stools x2 23. Last BM formed on .  She feels fatigued, has had intermittent flushing.  Chills evening of  but has had no fever.       R-(recommendations): Be seen in clinic today.  No openings.  ESTEFANI Mcintosh R.N.      Reason for Disposition    Pain is mainly in upper abdomen (if needed ask: 'is it mainly above the belly button?')    Age > 60 years    Patient wants to be seen    Additional Information    Negative: Passed out (i.e., fainted, collapsed and was not responding)    Negative: Shock suspected (e.g., cold/pale/clammy skin, too weak to stand, low BP, rapid pulse)    Negative: Sounds like a life-threatening emergency to the triager    Negative: Chest pain    Negative: Passed out (i.e., fainted, collapsed and was not responding)    Negative: Shock suspected (e.g., cold/pale/clammy skin, too weak to stand, low BP, rapid pulse)    Negative: Visible sweat on face or sweat is dripping down    Negative: Chest pain    Negative: SEVERE abdominal pain (e.g., excruciating)    Negative: Pain lasting > 10 minutes and over 50 years old    Negative: Pain lasting > 10 minutes and over 40 years old and associated chest, arm, neck, upper back, or jaw pain    Negative: Pain lasting > 10 minutes and over 35 years old and at least one cardiac risk factor (i.e.,  "hypertension, diabetes, obesity, smoker or strong family history of heart disease)    Negative: Pain lasting > 10 minutes and history of heart disease (i.e., heart attack, bypass surgery, angina, angioplasty, CHF)    Negative: Recent injury to the abdomen    Negative: Vomiting red blood or black (coffee ground) material    Negative: Bloody, black, or tarry bowel movements  (Exception: Chronic-unchanged black-grey bowel movements and is taking iron pills or Pepto-Bismol.)    Negative: Pregnant 20 weeks or more and new hand or face swelling    Negative: Constant abdominal pain lasting > 2 hours    Negative: Vomiting bile (green color)    Negative: Patient sounds very sick or weak to the triager    Negative: Vomiting and abdomen looks much more swollen than usual    Negative: Fever > 103 F (39.4 C)    Negative: Fever > 101 F (38.3 C) and over 60 years of age    Negative: Fever > 100.0 F (37.8 C) and has diabetes mellitus or a weak immune system (e.g., HIV positive, cancer chemotherapy, organ transplant, splenectomy, chronic steroids)    Negative: Fever > 100.0 F (37.8 C) and bedridden (e.g., nursing home patient, stroke, chronic illness, recovering from surgery)    Negative: White of the eyes have turned yellow (i.e., jaundice)    Answer Assessment - Initial Assessment Questions  1. LOCATION: \"Where does it hurt?\"       Started in upper abdomen and now seems concentrated around umbilicus.  2. RADIATION: \"Does the pain shoot anywhere else?\" (e.g., chest, back)      Radiated to right flank yesterday for a couple of hours  3. ONSET: \"When did the pain begin?\" (e.g., minutes, hours or days ago)       Evening of 2/13/23  4. SUDDEN: \"Gradual or sudden onset?\"      sudden  5. PATTERN \"Does the pain come and go, or is it constant?\"     - If constant: \"Is it getting better, staying the same, or worsening?\"       (Note: Constant means the pain never goes away completely; most serious pain is constant and it progresses)      - If " "intermittent: \"How long does it last?\" \"Do you have pain now?\"      (Note: Intermittent means the pain goes away completely between bouts)      Constant pain that varies in intensity  6. SEVERITY: \"How bad is the pain?\"  (e.g., Scale 1-10; mild, moderate, or severe)    - MILD (1-3): doesn't interfere with normal activities, abdomen soft and not tender to touch     - MODERATE (4-7): interferes with normal activities or awakens from sleep, abdomen tender to touch     - SEVERE (8-10): excruciating pain, doubled over, unable to do any normal activities       5 out of 10  7. RECURRENT SYMPTOM: \"Have you ever had this type of stomach pain before?\" If Yes, ask: \"When was the last time?\" and \"What happened that time?\"       never  8. CAUSE: \"What do you think is causing the stomach pain?\"      Does not know   9. RELIEVING/AGGRAVATING FACTORS: \"What makes it better or worse?\" (e.g., movement, antacids, bowel movement)      May feel worse when sh lays down  10. OTHER SYMPTOMS: \"Do you have any other symptoms?\" (e.g., back pain, diarrhea, fever, urination pain, vomiting)        Did have 1 day of diarrhea stools x3  11. PREGNANCY: \"Is there any chance you are pregnant?\" \"When was your last menstrual period?\"        NA    Protocols used: ABDOMINAL PAIN - FEMALE-A-OH, ABDOMINAL PAIN - UPPER-A-OH      "

## 2023-02-16 NOTE — TELEPHONE ENCOUNTER
Urgent care today or ok to be put in an early afternoon virtual spot of [facility] on 2/17/23.      - Nicolas, CNP

## 2023-02-17 ENCOUNTER — OFFICE VISIT (OUTPATIENT)
Dept: FAMILY MEDICINE | Facility: CLINIC | Age: 63
End: 2023-02-17
Payer: COMMERCIAL

## 2023-02-17 VITALS
WEIGHT: 179 LBS | BODY MASS INDEX: 25.06 KG/M2 | HEART RATE: 83 BPM | DIASTOLIC BLOOD PRESSURE: 64 MMHG | SYSTOLIC BLOOD PRESSURE: 102 MMHG | HEIGHT: 71 IN | RESPIRATION RATE: 18 BRPM | TEMPERATURE: 98.3 F | OXYGEN SATURATION: 98 %

## 2023-02-17 DIAGNOSIS — I70.0 AORTIC CALCIFICATION (H): ICD-10-CM

## 2023-02-17 DIAGNOSIS — R10.84 ABDOMINAL PAIN, GENERALIZED: Primary | ICD-10-CM

## 2023-02-17 DIAGNOSIS — R19.7 DIARRHEA, UNSPECIFIED TYPE: ICD-10-CM

## 2023-02-17 LAB
ALBUMIN SERPL BCG-MCNC: 4.3 G/DL (ref 3.5–5.2)
ALP SERPL-CCNC: 67 U/L (ref 35–104)
ALT SERPL W P-5'-P-CCNC: 21 U/L (ref 10–35)
ANION GAP SERPL CALCULATED.3IONS-SCNC: 13 MMOL/L (ref 7–15)
AST SERPL W P-5'-P-CCNC: 23 U/L (ref 10–35)
BILIRUB SERPL-MCNC: 0.4 MG/DL
BUN SERPL-MCNC: 12 MG/DL (ref 8–23)
CALCIUM SERPL-MCNC: 9.1 MG/DL (ref 8.8–10.2)
CHLORIDE SERPL-SCNC: 103 MMOL/L (ref 98–107)
CREAT SERPL-MCNC: 0.74 MG/DL (ref 0.51–0.95)
DEPRECATED HCO3 PLAS-SCNC: 25 MMOL/L (ref 22–29)
ERYTHROCYTE [DISTWIDTH] IN BLOOD BY AUTOMATED COUNT: 12.6 % (ref 10–15)
GFR SERPL CREATININE-BSD FRML MDRD: 90 ML/MIN/1.73M2
GLUCOSE SERPL-MCNC: 86 MG/DL (ref 70–99)
HCT VFR BLD AUTO: 39.3 % (ref 35–47)
HGB BLD-MCNC: 13.1 G/DL (ref 11.7–15.7)
LIPASE SERPL-CCNC: 47 U/L (ref 13–60)
MCH RBC QN AUTO: 31.3 PG (ref 26.5–33)
MCHC RBC AUTO-ENTMCNC: 33.3 G/DL (ref 31.5–36.5)
MCV RBC AUTO: 94 FL (ref 78–100)
PLATELET # BLD AUTO: 200 10E3/UL (ref 150–450)
POTASSIUM SERPL-SCNC: 4 MMOL/L (ref 3.4–5.3)
PROT SERPL-MCNC: 6.5 G/DL (ref 6.4–8.3)
RBC # BLD AUTO: 4.19 10E6/UL (ref 3.8–5.2)
SODIUM SERPL-SCNC: 141 MMOL/L (ref 136–145)
WBC # BLD AUTO: 5 10E3/UL (ref 4–11)

## 2023-02-17 PROCEDURE — 83690 ASSAY OF LIPASE: CPT | Performed by: NURSE PRACTITIONER

## 2023-02-17 PROCEDURE — 80053 COMPREHEN METABOLIC PANEL: CPT | Performed by: NURSE PRACTITIONER

## 2023-02-17 PROCEDURE — 85027 COMPLETE CBC AUTOMATED: CPT | Performed by: NURSE PRACTITIONER

## 2023-02-17 PROCEDURE — 99213 OFFICE O/P EST LOW 20 MIN: CPT | Performed by: NURSE PRACTITIONER

## 2023-02-17 PROCEDURE — 36415 COLL VENOUS BLD VENIPUNCTURE: CPT | Performed by: NURSE PRACTITIONER

## 2023-02-17 RX ORDER — CHLORAL HYDRATE 500 MG
1 CAPSULE ORAL DAILY
COMMUNITY
End: 2024-08-07

## 2023-02-17 NOTE — PROGRESS NOTES
Assessment & Plan     Alfonzo was seen today for abdominal pain.    Diagnoses and all orders for this visit:    Abdominal pain, generalized  Diarrhea, unspecified type  Viral gastroenteritis vs. Gastritis vs. Cholelithiasis.   Plan further evaluation with labs and abdominal ultrasound.   Focus on clear liquids and advancing diet slowly as tolerated.      -     US Abdomen Limited; Future  -     CBC with platelets  -     Comprehensive metabolic panel (BMP + Alb, Alk Phos, ALT, AST, Total. Bili, TP)  -     Lipase      Return in about 1 week (around 2023) for No improvement or sooner with worsening symptoms.    Alba Iraheta, LORENZO CNP  [facility]      Alfonzo is a 62 year old, presenting for the following health issues:  Abdominal Pain      History of Present Illness       Reason for visit:  Abdominal pain  Symptom onset:  1-3 days ago  Symptoms include:  Abdo pain, slight nausea, fatigue  Symptom intensity:  Moderate  Symptom progression:  Staying the same  Had these symptoms before:  No  What makes it worse:  No  What makes it better:  No    She eats 2-3 servings of fruits and vegetables daily.She consumes 0 sweetened beverage(s) daily.She exercises with enough effort to increase her heart rate 20 to 29 minutes per day.  She exercises with enough effort to increase her heart rate 4 days per week. She is missing 2 dose(s) of medications per week.  She is not taking prescribed medications regularly due to remembering to take.       Pain History:  When did you first notice your pain? - Acute Pain   Have you seen anyone else for your pain? No  Where in your body do you have pain? Abdominal/Flank Pain  Onset/Duration: Ate a salad  that had  2 days prior, pain began that night.  History of GERD.  Started Omega 3 fish oil with Vit D, E, A 2 wks ago per eye doctor. Developed upper abdominal pain 23 evening, pain is constant but varies in intensity.  Comes in quick  jabs of pain.  At times pain is mid abdomen around umbilicus and this morning she felt as if she was having reflux symptoms with belching, intermittent nausea.  She denies fever, vomiting, radiation of pain into chest or back.  Feels better if she stands.  Pain is not affected by eating, she has not lost her appetite. Diarrhea stools x2 2/14/23. Last BM formed on 2/14.  She feels fatigued, has had intermittent flushing.  Chills evening of 2/13 but has had no fever    Was feeling fine overall on Monday.    After dinner that night had a sharp pain in upper stomach area.  Mild nausea.  3 rounds of diarrhea, slept ok.  Continued nausea on Wednesday. No fevers.  No vomiting or diarrhea.  +Abdominal pain.  Seems to be related to eating, sharper pain 20-30 minutes after eating.    Woke up yesterday with symptoms of reflux - didn't feel well yesterday, took a 2 hour nap which is not normal.    Tried 1 TUMS.    Patient reports that she is feeling slightly better.  Does have occasionally wave of pain.        Description:   Character: Sharp  Location: epigastric region  Radiation: Noticed pain once by right kidney- only one episode   Intensity: mild  Progression of Symptoms:  improving  Accompanying Signs & Symptoms:  Fever/Chills: No  Gas/Bloating: belching only   Nausea: YES- intermittently - better today - feels fatigued.   Vomitting: No  Diarrhea: Not anymore just the first day   Constipation: YES- always is nothing new   Dysuria or Hematuria: No  History:   Trauma: No  Previous similar pain: No  Previous tests done: none  Precipitating factors:   Does the pain change with: 20 minutes after she eats she notices an intense wave of pain 4/10      Food: No    Bowel Movement: No    Urination: No   Other factors:  No  Therapies tried and outcome: None    Patient's last menstrual period was 05/01/2009.        Review of Systems    Constitutional, HEENT, cardiovascular, pulmonary, gi and gu systems are negative, except as  "otherwise noted.      Objective    /64   Pulse 83   Temp 98.3  F (36.8  C)   Resp 18   Ht 1.803 m (5' 11\")   Wt 81.2 kg (179 lb)   LMP 05/01/2009   SpO2 98%   BMI 24.97 kg/m    Body mass index is 24.97 kg/m .     Physical Exam     GENERAL: healthy, alert and no distress  EYES: Eyes grossly normal to inspection, PERRL and conjunctivae and sclerae normal  RESP: lungs clear to auscultation - no rales, rhonchi or wheezes  CV: regular rate and rhythm, normal S1 S2, no S3 or S4, no murmur, click or rub, no peripheral edema  ABDOMEN: soft,diffuse epigastric ttp, no hepatosplenomegaly, no masses and bowel sounds normal  PSYCH: mentation appears normal, affect normal/bright                "

## 2023-02-17 NOTE — RESULT ENCOUNTER NOTE
Dear Alfonzo,     -Normal red blood cell (hgb) levels, normal white blood cell count and normal platelet levels.      Please send a [employer] message or call [phone]  if you have any questions.      LORENZO Dooley, [facility]    If you have further questions about the interpretation of your labs, [email or web address] is a good website to check out for further information.

## 2023-02-22 NOTE — RESULT ENCOUNTER NOTE
Cameron Mejía,     -Liver and gallbladder tests are normal (ALT,AST, Alk phos, bilirubin), kidney function is normal (Cr, GFR), sodium is normal, potassium is normal, calcium is normal, glucose is normal.  -Lipase (pancreatic enzyme) was within a normal range.        Please send a [employer] message or call [phone]  if you have any questions.      Alba Iraheta, APRN, CNP  [facility]    If you have further questions about the interpretation of your labs, [email or web address] is a good website to check out for further information.

## 2023-03-03 ENCOUNTER — HOSPITAL ENCOUNTER (OUTPATIENT)
Dept: ULTRASOUND IMAGING | Facility: CLINIC | Age: 63
Discharge: HOME | End: 2023-03-03
Attending: NURSE PRACTITIONER | Admitting: NURSE PRACTITIONER
Payer: COMMERCIAL

## 2023-03-03 DIAGNOSIS — R10.84 ABDOMINAL PAIN, GENERALIZED: ICD-10-CM

## 2023-03-03 PROCEDURE — 76705 ECHO EXAM OF ABDOMEN: CPT

## 2023-03-03 NOTE — RESULT ENCOUNTER NOTE
Dear Alfonzo,     Your ultrasound was overall normal and reassuring.  I am hoping you are feeling better!       Please send a [employer] message or call [phone]  if you have any questions.      OLRENZO Dooley, [facility]    If you have further questions about the interpretation of your labs, [email or web address] is a good website to check out for further information.

## 2023-04-14 ENCOUNTER — TELEPHONE (OUTPATIENT)
Dept: FAMILY MEDICINE | Facility: CLINIC | Age: 63
End: 2023-04-14
Payer: MEDICARE

## 2023-04-14 DIAGNOSIS — Q99.9 CHROMOSOMAL HEREDITARY DISORDER: Primary | ICD-10-CM

## 2023-04-14 NOTE — TELEPHONE ENCOUNTER
Patient is calling and reports that her grand babies were diagnosed with deletion of chromosome 1Q21.1, patient's daughter (grandbabies mother) was also diagnosed with this deletion. It was suggested to the patient to seek genetic testing. Please advise. Patient is okay with a detailed message left on phone, and okay with a [employer] message being sent as well regarding this.

## 2023-04-17 NOTE — TELEPHONE ENCOUNTER
Triage, please call pt - what disease process is this associated with?   Just wanted to check to see to where we should refer pt for testing.

## 2023-04-19 ENCOUNTER — TELEPHONE (OUTPATIENT)
Dept: SLEEP MEDICINE | Facility: CLINIC | Age: 63
End: 2023-04-19
Payer: MEDICARE

## 2023-04-19 NOTE — TELEPHONE ENCOUNTER
Order/Referral Request    Who is requesting: Patient    Orders being requested: Prescription for cpap supplies    Reason service is needed/diagnosis: Sleep apnea    When are orders needed by: as soon as possible     Has this been discussed with Provider: No    Does patient have a preference on a Group/Provider/Facility?     Does patient have an appointment scheduled?: No     Where to send orders: Place orders within [facility] will go through [facility].      Could we send this information to you in [employer] or would you prefer to receive a phone call?:   Patient would prefer a phone call   Okay to leave a detailed message?: Yes at Cell number on file:    Telephone Information:   Mobile [phone]

## 2023-04-19 NOTE — TELEPHONE ENCOUNTER
Patient has current order 10/6/22 to include supplies. Routing to DME.     Megan GARCÍA RN  [facility]

## 2023-04-19 NOTE — TELEPHONE ENCOUNTER
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      Pt does not have any cardiac or renal concerns at the moment but if she has this then she needs to let the rest of her kids know that they need to get tested.     Please advise     Michelle Mello RN, BSN  [facility]

## 2023-04-24 ENCOUNTER — TELEPHONE (OUTPATIENT)
Dept: SLEEP MEDICINE | Facility: CLINIC | Age: 63
End: 2023-04-24
Payer: MEDICARE

## 2023-04-24 NOTE — TELEPHONE ENCOUNTER
SHAHLA HEBERT PT KNOW WE ARE UNABLE TO SET HER UP DUE TO INSURANCE GUIDELINES W/O 2ND DX OR HIGHER AHI.    Jasiel Teague, Respiratory DME Coordinator

## 2023-04-27 ENCOUNTER — TELEPHONE (OUTPATIENT)
Dept: CONSULT | Facility: CLINIC | Age: 63
End: 2023-04-27
Payer: MEDICARE

## 2023-04-27 NOTE — TELEPHONE ENCOUNTER
Spoke with patient and assisted in scheduling appointment.    Future Appointments   Date Time Provider Department Center   5/19/2023  8:00 AM Taylor Shin [facility] P [facility]

## 2023-04-27 NOTE — TELEPHONE ENCOUNTER
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 Need to schedule as soon as possible per grandchildren, 4 years old and 6 months with possible early sudden cardiac death. Please call Alfonzo back at [phone].     Action Taken: Other: Peds Genetics    Travel Screening: Not Applicable

## 2023-05-18 NOTE — PROGRESS NOTES
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     Alfonzo was accompanied to this visit by her .     History is obtained from Patient and electronic health record. I met with the family at the request of Dr. Kristal Peña to obtain a personal and family history, discuss possible genetic contributions to her symptoms, and to obtain informed consent for genetic testing if indicated.      ASSESSMENT & PLAN  Alfonzo is a 63 year old-year old female with a family history of 1q21.1-q21.2 deletion syndrome.     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 A FISH report was provided which does not include the genomic coordinates or mechanistic information. Because the deletion is in the middle of chromosome 1's q arm, I do not believe it would be due to a translocation, rather it is likely these are 2 independent copy number variants.  The classification of the copy number variants was not provided, but it sounds as though the  1q21 deletion is pathogenic and the 17q duplication is of uncertain significance.  The family reports that Kelly and her 2 daughters all have different deletions sizes (1.4Mb, 1.9Mb, and >1.9Mb).  Deletions such as this are usually inherited in an autosomal dominant manner and would not differ in size.  Small differences in deletion sizes could be due to different probe densities on the " MicroArray's as they were tested at different labs.  We would like to review the original genetics records on Kelly and her daughters to better understand the deletions sizes and the genes are in the region.  This will allow us to identify the correct test for the copy number variants, as well as what management may look like if either or both of these copy number variants are identified in Alfonzo or Del.  We also like to review the notes from the  to better understand the sudden death risk/which cardiac condition this is related to and the renal disease risk.  To my knowledge, the 1q21.1 deletion would cause congenital heart and/or renal defects.      1. Release of information forms for Kelly, Flori, and Tena signed with verbal permission for genetic test reports and genetics notes from [facility]  2. PHI consent to communicate form signed with Alfonzo and Del's verbal permission so we can communicate with their daughter Kelly and each other  3. Once we have obtained all pertinent records, we will reach out to discuss the genetic test, insurance/billing, and sample collection for Carolynn's genetic testing  4. Contact information was provided should any questions arise in the future.       HPI:  Alfonzo is a 63 year old-year old female with a family history of a 1q21.1q21.2 microdeletion and a 17q25.3 duplication in her daughter and 2 granddaughters.  The index case in the family was her 4-year-old granddaughter, Tena.  She began having developmental delays, had an abnormal skull shape requiring a helmet, strabismus, and Chiari malformation.  MicroArray was performed which identified a copy number variants above.  Her younger sister, Flori, was later tested due to dysmorphic features and she was also found to have both copy number variants.  Their mother, Kelly was tested and also found to have both copy number variants.  The family is interested in genetic testing for  Kelly's parents, Alfonzo and Del, to better understand the risk to their sons who do not yet have children.  They report that sudden cardiac death and renal failure are associated with the 1q21 deletion and would like to ensure they are being screened appropriately for any medical concerns associated with these copy number variants.    Alfonzo denies birth defects, developmental delays, learning differences, growth delay, skeletal anomalies, seizures, or other major health concerns    Alfonzo is interested in genetic testing for the familial variants    Patient Active Problem List   Diagnosis     Pain in joint, pelvic region and thigh     Seasonal allergic rhinitis     Symptomatic menopausal or female climacteric states     Sleep apnea     history of Thrombophlebitis leg (H)     Plantar fasciitis     Seborrheic dermatitis     Infrapatellar bursitis     Low blood pressure- usual - asymptomatic- 90/60 = baseline     Family history of hypothyroidism- father     Memory difficulties- some short-term, worse at work     Aortic calcification (H)     Nonrheumatic aortic valve insufficiency - mild - no symptoms.      BMI 28.0-28.9,adult     Asymptomatic varicose veins     Gastroesophageal reflux disease, esophagitis presence not specified     Inadequate sleep hygiene       Pertinent studies/abnormal test results:   No history of genetic testing    Imaging results:   Echocardiogram 5/6/2016  Left ventricular systolic function is normal.  The visual ejection fraction is estimated at 60-65%.  There is mild (1+) aortic regurgitation.  The study was technically adequate. There is no comparison study available.    EKG 3/30/2021  Sinus  Rhythm  - occasional PAC     # PACs = 1.  WITHIN NORMAL LIMITS    Abdominal ultrasound 3/3/2023  Negative limited abdominal ultrasound.    No results found for this or any previous visit (from the past 744 hour(s)).  No results found for any visits on 05/19/23.  No results found for this or any previous  visit (from the past 8760 hour(s)).      Past Medical History:  Past Medical History:   Diagnosis Date     Aortic calcification (H) 2016     Asymptomatic varicose veins      Contact dermatitis and other eczema, due to unspecified cause     seasonally     Infrapatellar bursitis      Nonrheumatic aortic valve insufficiency - mild - no symptoms.     incidental finding on thoracic XR     Papanicolaou smear of cervix with atypical squamous cells of undetermined significance (ASC-US) 16    Neg HR HPV     Phlebitis and thrombophlebitis of femoral vein (deep) (superficial) (H) not candidate for HRT     popliteal after progesterone oral therapy - no other clots      Sleep apnea     now on CPAP        Past Surgical History:  Past Surgical History:   Procedure Laterality Date      SECTION      S/P CSECT X 3     ENDOSCOPIC STRIPPING VEIN(S)  ,    S/P VEIN STRIPPING      TUBAL LIGATION      with 3rd      ZZHC COLONOSCOPY THRU STOMA, DIAGNOSTIC  2009     [place] - normal [place] in [place]         FAMILY HISTORY  A three generation pedigree was obtained today and scanned into the EMR. The following information is significant:    Children     Daughter, Kelly: She was found to have a 1q21.1q21.2 deletion that is reported to be over 1.9 megabases in size.  FISH report was available for review which did not last the size.  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  The classification of these variants is unknown. Kelly has three children. Two daughters, Tena and Flori have the deletion and duplication. Tena is 4 years old.  She was noted to have developmental delays, and abnormal skull shape requiring a helmet, strabismus, and Chiari malformation.  Microarray identified both the deletion and duplication.  Flori is 7 months old.  She is noted to have dysmorphic facial features.  She was found to have both the deletion and duplication. Kelly's son, Toño, is 4yo. His testing is  pending at this time. He is well.    Siblings    Full siblings: 3 well brothers    Paternal half siblings: None    Maternal half siblings: None    Maternal Family    Mother, Data Unavailable: Well    Maternal grandfather: Deferred    Maternal grandmother: Deferred    Maternal aunts/uncles: Deferred    Maternal cousins: Deferred    Maternal ancestry: Deferred    Paternal Family    Father, Data Unavailable: Passed due to ALS in the nineties.  He was also diagnosed with prostate cancer at 69    Paternal grandfather: Deferred    Paternal grandmother: Deferred    Paternal aunts/uncles: Deferred    Paternal cousins: Deferred    Paternal ancestry: Deferred    The family history is otherwise negative for epilepsy, cancers, renal failure, sudden cardiac death, arrhythmia, hearing loss, vision loss, intellectual disability, developmental delay, short stature, birth defects, and known genetic disorders. Consanguinity is denied.    DISCUSSION  Genetics  Today we reviewed that our genetic material or DNA is responsible for how our bodies grow and develop. It can be thought of as an instruction manual. This instruction manual is made up of chapters called genes. Our genes are inherited on structures called chromosomes, of which we have 23 pairs for a total of 46. For each chromosome pair, one copy is inherited from the mother and one is inherited from the father. The chromosome pairs are numbered from 1 to 22, and the 23rd pair of chromsomes is called the sex chromsomes. These determine if we are a male or female.     Changes in the chromosomes or in the DNA sequence of a gene can cause the signs and symptoms of a genetic condition because the instructions it is providing to the body have been altered. This can be a small spelling error in the gene, a large duplicated piece of information, or a large missing piece of information.     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  This is a common  genetic condition as it is due to a recurrent deletion.  The reason it is a recurrent deletion is due to highly similar sequences called low copy repeats at this region on chromosome #1.  The main features of this deletion syndrome are developmental delays, intellectual disability, growth differences, microcephaly, and birth defects.  Congenital heart and renal defects are noted in some patients.  We reviewed that this is a variable genetic condition and some individuals may have few if any features.  There may be variability even within the same family.  The deletion sizes can vary, so we can review the familial deletion in greater detail after he received a copy of the genetic test report.  In addition.  We will coordinate genetic testing for this deletion after we have reviewed this report so we can choose the most appropriate genetic test (FISH or targeted array).    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  Based on our conversation today, it sounds as though it might not be a pathogenic variant and might be normal human variation.  Similar to the 1q21.1 deletion, would like to review the genetic test reports determine the clinical relevance of this duplication and whether or not we should pursue genetic testing report in [facility] Shin [facility]  Genetic Counselor   [facility]   Phone: [phone]  Pager: [phone]            Approximate Time Spent in Consultation: 25 min     CC: patient      This note was written with the assistance of voice recognition software and may contain occasional typographic errors. Please contact our office if you identify errors requiring correction.

## 2023-05-19 ENCOUNTER — VIRTUAL VISIT (OUTPATIENT)
Dept: CONSULT | Facility: CLINIC | Age: 63
End: 2023-05-19
Attending: FAMILY MEDICINE
Payer: MEDICARE

## 2023-05-19 DIAGNOSIS — Q99.9 CHROMOSOMAL HEREDITARY DISORDER: ICD-10-CM

## 2023-05-19 PROCEDURE — 96040 HC GENETIC COUNSELING, EACH 30 MINUTES: CPT | Mod: GT | Performed by: GENETIC COUNSELOR, MS

## 2023-05-19 NOTE — LETTER
"2023      RE: Alfonzo Cho  [address] [address] [id number]     Dear Colleague,    Thank you for the opportunity to participate in the care of your patient, Alfonzo Cho, at the [facility] at [facility]. Please see a copy of my visit note below.    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     Alfonzo was accompanied to this visit by her .     History is obtained from Patient and electronic health record. I met with the family at the request of Dr. Kristal Peña to obtain a personal and family history, discuss possible genetic contributions to her symptoms, and to obtain informed consent for genetic testing if indicated.      ASSESSMENT & PLAN  Alfonzo is a 63 year old-year old female with a family history of 1q21.1-q21.2 deletion syndrome.     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 A FISH report was provided which does not include the genomic coordinates or mechanistic information. Because the deletion is in the middle of chromosome 1's q arm, I do not believe it would be due to a translocation, rather it is likely these are 2 independent copy number variants.  The classification of the copy number variants was not provided, but it sounds as though the  1q21 deletion is pathogenic and " the 17q duplication is of uncertain significance.  The family reports that Kelly and her 2 daughters all have different deletions sizes (1.4Mb, 1.9Mb, and >1.9Mb).  Deletions such as this are usually inherited in an autosomal dominant manner and would not differ in size.  Small differences in deletion sizes could be due to different probe densities on the MicroArray's as they were tested at different labs.  We would like to review the original genetics records on Kelly and her daughters to better understand the deletions sizes and the genes are in the region.  This will allow us to identify the correct test for the copy number variants, as well as what management may look like if either or both of these copy number variants are identified in Alfonzo or Del.  We also like to review the notes from the  to better understand the sudden death risk/which cardiac condition this is related to and the renal disease risk.  To my knowledge, the 1q21.1 deletion would cause congenital heart and/or renal defects.      Release of information forms for Kelly, Flori, and Tena signed with verbal permission for genetic test reports and genetics notes from [facility]  PHI consent to communicate form signed with Carolynn's verbal permission so we can communicate with their daughter Kelly and each other  Once we have obtained all pertinent records, we will reach out to discuss the genetic test, insurance/billing, and sample collection for Carolynn's genetic testing  Contact information was provided should any questions arise in the future.       HPI:  Alfonzo is a 63 year old-year old female with a family history of a 1q21.1q21.2 microdeletion and a 17q25.3 duplication in her daughter and 2 granddaughters.  The index case in the family was her 4-year-old granddaughter, Tena.  She began having developmental delays, had an abnormal skull shape requiring a helmet, strabismus, and Chiari  malformation.  MicroArray was performed which identified a copy number variants above.  Her younger sister, Flori, was later tested due to dysmorphic features and she was also found to have both copy number variants.  Their mother, Kelly was tested and also found to have both copy number variants.  The family is interested in genetic testing for Kelly's parents, Alfonzo and Del, to better understand the risk to their sons who do not yet have children.  They report that sudden cardiac death and renal failure are associated with the 1q21 deletion and would like to ensure they are being screened appropriately for any medical concerns associated with these copy number variants.    Alfonzo denies birth defects, developmental delays, learning differences, growth delay, skeletal anomalies, seizures, or other major health concerns    Alfonzo is interested in genetic testing for the familial variants    Patient Active Problem List   Diagnosis    Pain in joint, pelvic region and thigh    Seasonal allergic rhinitis    Symptomatic menopausal or female climacteric states    Sleep apnea    history of Thrombophlebitis leg (H)    Plantar fasciitis    Seborrheic dermatitis    Infrapatellar bursitis    Low blood pressure- usual - asymptomatic- 90/60 = baseline    Family history of hypothyroidism- father    Memory difficulties- some short-term, worse at work    Aortic calcification (H)    Nonrheumatic aortic valve insufficiency - mild - no symptoms.     BMI 28.0-28.9,adult    Asymptomatic varicose veins    Gastroesophageal reflux disease, esophagitis presence not specified    Inadequate sleep hygiene       Pertinent studies/abnormal test results:   No history of genetic testing    Imaging results:   Echocardiogram 5/6/2016  Left ventricular systolic function is normal.  The visual ejection fraction is estimated at 60-65%.  There is mild (1+) aortic regurgitation.  The study was technically adequate. There is no comparison study  available.    EKG 3/30/2021  Sinus  Rhythm  - occasional PAC     # PACs = 1.  WITHIN NORMAL LIMITS    Abdominal ultrasound 3/3/2023  Negative limited abdominal ultrasound.    No results found for this or any previous visit (from the past 744 hour(s)).  No results found for any visits on 23.  No results found for this or any previous visit (from the past 8760 hour(s)).      Past Medical History:  Past Medical History:   Diagnosis Date    Aortic calcification (H) 2016    Asymptomatic varicose veins     Contact dermatitis and other eczema, due to unspecified cause     seasonally    Infrapatellar bursitis     Nonrheumatic aortic valve insufficiency - mild - no symptoms.     incidental finding on thoracic XR    Papanicolaou smear of cervix with atypical squamous cells of undetermined significance (ASC-US) 16    Neg HR HPV    Phlebitis and thrombophlebitis of femoral vein (deep) (superficial) (H) not candidate for HRT     popliteal after progesterone oral therapy - no other clots     Sleep apnea     now on CPAP        Past Surgical History:  Past Surgical History:   Procedure Laterality Date     SECTION      S/P CSECT X 3    ENDOSCOPIC STRIPPING VEIN(S)  ,    S/P VEIN STRIPPING     TUBAL LIGATION      with 3rd     ZZHC COLONOSCOPY THRU STOMA, DIAGNOSTIC  2009     [place] in [place]         FAMILY HISTORY  A three generation pedigree was obtained today and scanned into the EMR. The following information is significant:    Children   Daughter, Kelly: She was found to have a 1q21.1q21.2 deletion that is reported to be over 1.9 megabases in size.  FISH report was available for review which did not last the size.  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  The classification of these variants is unknown. Kelly has three children. Two daughters, Tena and Flori have the deletion and duplication. Tena is 4 years old.  She was noted to have developmental delays,  and abnormal skull shape requiring a helmet, strabismus, and Chiari malformation.  Microarray identified both the deletion and duplication.  Flori is 7 months old.  She is noted to have dysmorphic facial features.  She was found to have both the deletion and duplication. Kelly's son, Toño, is 2yo. His testing is pending at this time. He is well.    Siblings  Full siblings: 3 well brothers  Paternal half siblings: None  Maternal half siblings: None    Maternal Family  Mother, Data Unavailable: Well  Maternal grandfather: Deferred  Maternal grandmother: Deferred  Maternal aunts/uncles: Deferred  Maternal cousins: Deferred  Maternal ancestry: Deferred    Paternal Family  Father, Data Unavailable: Passed due to ALS in the nineties.  He was also diagnosed with prostate cancer at 69  Paternal grandfather: Deferred  Paternal grandmother: Deferred  Paternal aunts/uncles: Deferred  Paternal cousins: Deferred  Paternal ancestry: Deferred    The family history is otherwise negative for epilepsy, cancers, renal failure, sudden cardiac death, arrhythmia, hearing loss, vision loss, intellectual disability, developmental delay, short stature, birth defects, and known genetic disorders. Consanguinity is denied.    DISCUSSION  Genetics  Today we reviewed that our genetic material or DNA is responsible for how our bodies grow and develop. It can be thought of as an instruction manual. This instruction manual is made up of chapters called genes. Our genes are inherited on structures called chromosomes, of which we have 23 pairs for a total of 46. For each chromosome pair, one copy is inherited from the mother and one is inherited from the father. The chromosome pairs are numbered from 1 to 22, and the 23rd pair of chromsomes is called the sex chromsomes. These determine if we are a male or female.     Changes in the chromosomes or in the DNA sequence of a gene can cause the signs and symptoms of a genetic condition because the  instructions it is providing to the body have been altered. This can be a small spelling error in the gene, a large duplicated piece of information, or a large missing piece of information.     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  This is a common genetic condition as it is due to a recurrent deletion.  The reason it is a recurrent deletion is due to highly similar sequences called low copy repeats at this region on chromosome #1.  The main features of this deletion syndrome are developmental delays, intellectual disability, growth differences, microcephaly, and birth defects.  Congenital heart and renal defects are noted in some patients.  We reviewed that this is a variable genetic condition and some individuals may have few if any features.  There may be variability even within the same family.  The deletion sizes can vary, so we can review the familial deletion in greater detail after he received a copy of the genetic test report.  In addition.  We will coordinate genetic testing for this deletion after we have reviewed this report so we can choose the most appropriate genetic test (FISH or targeted array).    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  Based on our conversation today, it sounds as though it might not be a pathogenic variant and might be normal human variation.  Similar to the 1q21.1 deletion, would like to review the genetic test reports determine the clinical relevance of this duplication and whether or not we should pursue genetic testing report in [facility]  Genetic Counselor   [facility]   Phone: [phone]  Pager: [phone]            Approximate Time Spent in Consultation: 25 min     CC: patient      This note was written with the assistance of voice recognition software and may contain occasional typographic errors. Please contact our office if  you identify errors requiring correction.

## 2023-05-19 NOTE — NURSING NOTE
Is the patient currently in the state of [place]? YES    Visit mode:VIDEO    If the visit is dropped, the patient can be reconnected by: VIDEO VISIT: Send to e-mail at: [email or web address]    Will anyone else be joining the visit? Yes       How would you like to obtain your AVS? MyChart    Are changes needed to the allergy or medication list? NO    Reason for visit: Video Visit (New Patient- family history of genetic concern, looking to further discuss this.)

## 2023-06-06 ENCOUNTER — HOSPITAL ENCOUNTER (EMERGENCY)
Facility: CLINIC | Age: 63
Discharge: HOME | End: 2023-06-06
Attending: EMERGENCY MEDICINE | Admitting: EMERGENCY MEDICINE
Payer: MEDICARE

## 2023-06-06 VITALS
TEMPERATURE: 97.2 F | HEART RATE: 69 BPM | OXYGEN SATURATION: 99 % | DIASTOLIC BLOOD PRESSURE: 74 MMHG | SYSTOLIC BLOOD PRESSURE: 112 MMHG | RESPIRATION RATE: 18 BRPM

## 2023-06-06 DIAGNOSIS — H10.33 ACUTE CONJUNCTIVITIS OF BOTH EYES, UNSPECIFIED ACUTE CONJUNCTIVITIS TYPE: ICD-10-CM

## 2023-06-06 PROCEDURE — 99283 EMERGENCY DEPT VISIT LOW MDM: CPT

## 2023-06-06 RX ORDER — CIPROFLOXACIN HYDROCHLORIDE 3.5 MG/ML
1-2 SOLUTION/ DROPS TOPICAL 4 TIMES DAILY
Qty: 2.8 ML | Refills: 0 | Status: SHIPPED | OUTPATIENT
Start: 2023-06-06 | End: 2023-06-13

## 2023-06-06 RX ORDER — TETRACAINE HYDROCHLORIDE 5 MG/ML
2 SOLUTION OPHTHALMIC ONCE
Status: DISCONTINUED | OUTPATIENT
Start: 2023-06-06 | End: 2023-06-06 | Stop reason: HOSPADM

## 2023-06-06 ASSESSMENT — ACTIVITIES OF DAILY LIVING (ADL): ADLS_ACUITY_SCORE: 33

## 2023-06-06 ASSESSMENT — VISUAL ACUITY
OD: 20/25
OS: 20/100

## 2023-06-06 NOTE — ED TRIAGE NOTES
A&O x4, ABCs intact. Pt presents with concern for pink eye in both eyes. Bilateral eyes appear red. Pt states she has drainage.        Triage Assessment     Row Name 06/06/23 1049       Triage Assessment (Adult)    Airway WDL WDL       Respiratory WDL    Respiratory WDL WDL       Cardiac WDL    Cardiac WDL WDL

## 2023-06-06 NOTE — DISCHARGE INSTRUCTIONS
"Use antibiotic drops for 7 days  Can also use artifical tears for comfort  Return to ED for increased eye pain, vision changes, high fevers, or other concerns  Discharge Instructions  Conjunctivitis  Conjunctivitis, or \"pinkeye\", is inflammation of the conjunctiva, which is the thin membrane that lines the inner surface of the eyelids and the whites of the eyes.   There are four main types of conjunctivitis: viral, bacterial, allergic, and non-specific. Both bacterial and viral conjunctivitis spread easily from one person to another by contact with the eye or another person s hands, by an object the infected person has touched (such as a door handle), or by sharing an object that has touched their eye (such as a towel or pillowcase). Because of this, children with bacterial conjunctivitis cannot go back to school or  until they have been on antibiotics for 24 hours.  Generally, every Emergency Department visit should have a follow-up clinic visit with either a primary or a specialty clinic/provider. Please follow-up as instructed by your emergency provider today.  VIRAL CONJUNCTIVITIS: The virus that causes the common cold and is often seen as part of a general cold typically causes this type of conjunctivitis.  This type of conjunctivitis is not treated with antibiotics, and usually lasts 3 - 5 days.  An over-the-counter antihistamine/decongestant eye drop may help to relieve the itching and irritation of viral conjunctivitis.  BACTERIAL CONJUNCTIVITIS:  This is treated with an antibiotic ointment or eye drop.  In both bacterial and viral conjunctivitis, do not wear contact lenses until your eye is no longer red.   Your contact case should be thrown away and the contacts disinfected overnight, or replaced if disposable.  NON-SPECIFIC CONJUNCTIVITIS: Sometimes a red eye is caused by other things such as dry eye, chemical exposure, or foreign body in the eye such as dust or eyelash.   All of these problems " Pt. Here for pump disconnect. Pt. Tolerated treatment without incident. generally improve on their own within 24 hours.  ALLERGIC CONJUNCTIVITIS: These are eye symptoms caused by allergies. This type of conjunctivitis will be treated with allergy medications.    Return to the Emergency Department if:  If you have blurry vision.  If you have increasing eye pain or drainage.  If you have new redness or swelling in the skin around the eye.  If you were given a prescription for medicine here today, be sure to read all of the information (including the package insert) that comes with your prescription.  This will include important information about the medicine, its side effects, and any warnings that you need to know about.  The pharmacist who fills the prescription can provide more information and answer questions you may have about the medicine.  If you have questions or concerns that the pharmacist cannot address, please call or return to the Emergency Department.   Remember that you can always come back to the Emergency Department if you are not able to see your regular provider in the amount of time listed above, if you get any new symptoms, or if there is anything that worries you.

## 2023-06-06 NOTE — ED PROVIDER NOTES
History     Chief Complaint:  Eye Problem       HPI   Alfonzo Cho is a 63 year old female who presents with bilateral red eyes x3 days. Reports a viral illness has been making it's way through her family over the past week. She started with sore throat and rhinorrhea a few days prior to her eyes getting red. She is now experiencing bilateral redness, pain, itchiness, and drainage of the eyes. Crusting and pus-like discharge in the mornings. She has some residual sinus pressure as well. No fevers, cough, chills, nausea, or vomiting.  She does not wear contact lenses but does have history of glaucoma  lens surgery on both eyes, most recently 1 year ago.  No history of RA, Sjogrens, Lupus, or other personal autoimmune.  She has been using over the counter homeopathic eye drops and systane hydration drops.    Independent Historian:   None - Patient Only    Review of External Notes:   I reviewed urgent care triage phone encounter.      Medications:    cholecalciferol (VITAMIN D3) 25 mcg (1000 units) capsule  metroNIDAZOLE (METROCREAM) 0.75 % external cream  Omega-3 1000 MG capsule        Past Medical History:    Past Medical History:   Diagnosis Date     Aortic calcification (H) 2016     Asymptomatic varicose veins      Contact dermatitis and other eczema, due to unspecified cause      Infrapatellar bursitis      Nonrheumatic aortic valve insufficiency - mild - no symptoms.      Papanicolaou smear of cervix with atypical squamous cells of undetermined significance (ASC-US) 16     Phlebitis and thrombophlebitis of femoral vein (deep) (superficial) (H) not candidate for HRT      Sleep apnea        Past Surgical History:    Past Surgical History:   Procedure Laterality Date      SECTION      S/P CSECT X 3     ENDOSCOPIC STRIPPING VEIN(S)  ,02    S/P VEIN STRIPPING      TUBAL LIGATION      with 3rd      [facility] COLONOSCOPY THRU STOMA, DIAGNOSTIC  2009     [place] [facility] - normal - repeat in  2019         Physical Exam     Patient Vitals for the past 24 hrs:   BP Temp Temp src Pulse Resp SpO2   06/06/23 1050 108/72 (!) 96.4  F (35.8  C) Temporal 68 14 100 %      Physical Exam  Vital signs and nursing notes reviewed.    General: Alert and oriented. No acute distress. Nontoxic appearance.  Head: No signs of trauma.   Mouth/Throat: Oropharynx moist.   Eyes:    EOMI bilaterally. Pupils are equal, round, and reactive to light.      Right eye exhibits no chemosis and no exudate.   Right conjunctiva is injected. Right conjunctiva has no hemorrhage.     Left eye exhibits no chemosis and no exudate.   Left conjunctiva is injected. Left conjunctiva has no hemorrhage.     No scleral icterus.    No periorbital edema or erythema noted   The lids are normal appearing.  Slit Lamp  The right eye shows no corneal abrasion, no foreign body and no fluorescein uptake.        The left eye shows no corneal abrasion, no foreign body and no fluorescein uptake.   Right eye pressure: 10 mmHg  Left eye pressure: 10 mmHg  Neck: Normal range of motion.    CV: Appears well perfused. Normal heart sounds.  Resp: Normal effort of breathing. Lungs clear to auscultation bilaterally. No respiratory distress.   MSK: Normal range of motion. No obvious deformity.   Neuro: The patient is alert and interactive. Speech normal. GCS 15  Skin: No lesions or sign of trauma noted.   Psych: Normal mood and affect, and behavior.    Emergency Department Course   Imaging:    No orders to display      Laboratory:  Labs Ordered and Resulted from Time of ED Arrival to Time of ED Departure - No data to display     Procedures   None    Emergency Department Course & Assessments:       Interventions:  Medications - No data to display      Assessments:  1115 I initially assessed the patient and obtained the above history and physical exam.  1145 I performed slit lamp exam and ocular pressures  1200 Dr. Gomez assessed the patient    Independent Interpretation  (X-rays, CTs, rhythm strip):  None    Consultations/Discussion of Management or Tests:  None        Social Determinants of Health affecting care:   None    Disposition:  The patient was discharged to home.     Impression & Plan    CMS Diagnoses: None    Medical Decision Making:  Alfonzo Cho is a 63 year old female who presents for evaluation of bilateral eye redness and irritation. See HPI. Vital signs stable. On exam, patient had bilateral injection of her conjunctiva, no chemosis, hyphema, or hypopyon. Pupils were equal and reactive to light. Slit lamp and fluorescein uptake exams were performed. No corneal ulcer or fluorescein uptake noted. No foreign body visualized. No concerning findings to suggest iritis, scleritis, or uveitis. Ocular pressures were normal at 10 mmHg bilaterally, making acute closed angle glaucoma extremely unlikely. No erythema or edema of the lids or periorbital area; no evidence of pre-septal cellulitis. Visual acuity is at her baseline. Patient has been ill lately with URI symptoms, as has her family. I suspect a viral or bacterial conjunctivitis and will send ophthalmic ciprofloxacin drops. She is instructed to return to the ED for vision changes, fevers, worsening pain, or other concerns.    I staffed this patient with Dr. Gomez who agrees with the above assessment and plan.    Critical Care time:  was 0 minutes for this patient excluding procedures.    Diagnosis:    ICD-10-CM    1. Acute conjunctivitis of both eyes, unspecified acute conjunctivitis type  H10.33            Discharge Medications:  Discharge Medication List as of 6/6/2023 12:24 PM      START taking these medications    Details   ciprofloxacin (CILOXAN) 0.3 % ophthalmic solution Place 1-2 drops into both eyes 4 times daily for 7 days, Disp-2.8 mL, R-0, E-Prescribe            Tara Durham PA-C  6/6/2023          Tara Durham PA-C  06/06/23 0858

## 2023-06-06 NOTE — ED PROVIDER NOTES
Emergency Department Attending Supervision Note  6/6/2023  5:55 PM      I evaluated this patient in conjunction with Tara Durham PA-C      63-year-old female presents with bilateral eye erythema.  Symptoms began 3 days prior to arrival.  Erythema began in the right eye and then migrated to the left.  Prior to that, she had a mild sore throat, rhinorrhea and multiple sick contacts with similar symptoms.  She noted purulent drainage and crusting the morning.  She reports mild vague discomfort but nothing significant.  She denies vision changes or significant pain with extraocular movement.  No history of autoimmune disorder.      HEENT:    Oropharynx is moist, without lesions or trismus.  Eyes:    PERRL, EOMs intact.     Conjunctival erythema bilateral     IOP:       Right: 10 Left:10      No hyphema or hypopyon.  Neck:    Supple, no meningismus.     CV:     Regular rate and rhythm.      No murmurs, rubs or gallops.  PULM:    Clear to auscultation bilateral.       No respiratory distress.      Good air exchange.  MSK:     No gross deformity to all four extremities.   LYMPH:   No cervical lymphadenopathy.  NEURO:   Alert.     Good muscular tone.  Skin:    Warm, dry and intact.    Psych:    Mood is good and affect is appropriate.      Patient has been ruled out for glaucoma.  Visual acuity is at her baseline.  Slit-lamp per PA was negative for corneal abrasions, ulcerations or overt corneal pathology.  No concerning features at this time for iritis, scleritis or episcleritis.  Evaluation most consistent with conjunctivitis.  Patient given oral ophthalmic drops and follow-up to ensure improvement.  Return to ED for worsening symptoms      Diagnosis    (H10.33) Acute conjunctivitis of both eyes, unspecified acute conjunctivitis type      MD Jason Campbell Jeremiah R, MD  06/06/23 6919

## 2023-07-11 ENCOUNTER — TELEPHONE (OUTPATIENT)
Dept: CONSULT | Facility: CLINIC | Age: 63
End: 2023-07-11
Payer: MEDICARE

## 2023-07-11 DIAGNOSIS — Z84.89 FAMILY HISTORY OF GENETIC DISORDER: Primary | ICD-10-CM

## 2023-07-11 NOTE — TELEPHONE ENCOUNTER
Called Alfonzo regarding records received from her daughter, Kelly. Kelly provided consent to communicate with per parents, Del and Alfonzo.     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 Del and Alfonzo reported sudden death risk and renal disease risk associated with these variants. The 1q21 deletion can cause congenital heart and renal defects, but neither to my knowledge are associated with sudden death/later onset kidney disease. To ensure we were not missing additional genetic variants/interpretation, records from her  and genetic counselor were reviewed. No other genetic tests were sent and no other genetic variants were identified. There was no mention of sudden cardiac death or other kidney disease. We did call their office as well, but did not receive a response.     We will therefore order 1q21 deletion testing alone from Del and Alfonzo via targeted array at [facility]. Testing for the 17q duplication is not likely to impact care as it is a variant of uncertain significance. Billing covered with both Del and Alfonzo. Del elected to have a sample drawn alongside other labs. Alfonzo elected to use a buccal which will be mailed to her home. Results will be returned by phone in 4-6 weeks. If identified in one of them, they can consider an evaluation with a geneticists to ensure they have appropriate evaluations completed/ordered (e.g. echo/renal US). We can also test their relatives (parents, siblings, children). If the variant is not identified, we would assume it is a de radha event in Kelly and no further testing for relatives would be indicated unless they showed symptoms. Follow-up is therefore dependent on results.     Taylor Shin [facility]  Genetic Counselor   [facility]   Phone: [phone]

## 2023-07-12 ENCOUNTER — MEDICAL CORRESPONDENCE (OUTPATIENT)
Dept: HEALTH INFORMATION MANAGEMENT | Facility: CLINIC | Age: 63
End: 2023-07-12

## 2023-07-19 ENCOUNTER — HOSPITAL ENCOUNTER (OUTPATIENT)
Dept: MAMMOGRAPHY | Facility: CLINIC | Age: 63
Discharge: HOME | End: 2023-07-19
Attending: NURSE PRACTITIONER | Admitting: NURSE PRACTITIONER
Payer: MEDICARE

## 2023-07-19 DIAGNOSIS — Z12.31 VISIT FOR SCREENING MAMMOGRAM: ICD-10-CM

## 2023-07-19 PROCEDURE — 77067 SCR MAMMO BI INCL CAD: CPT

## 2023-08-30 ENCOUNTER — TELEPHONE (OUTPATIENT)
Dept: CONSULT | Facility: CLINIC | Age: 63
End: 2023-08-30
Payer: MEDICARE

## 2023-08-30 NOTE — TELEPHONE ENCOUNTER
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     Results  Targeted testing for the 1q21.1q21.2 microdeletion  was completed at [facility]. These results were negative.    Interpretation  Alfonzo did NOT have the deletion.     Her , Del, DID have the deletion.     Del and Alfonzo expressed excellent understanding of the clinical features, variability, penetrance, and inheritance of the 1q21.1q21.2 deletion.  There children can see a genetic counselor to discuss testing for the deletion.  Del's relatives can also consider this testing.  Please see his chart for details.    Plan  Alfonzo may follow-up if new concerns arise  I will mail results to home alongside interpretation note above  No additional questions or concerns. Contact information provided    Taylor Shin [facility]  Genetic Counselor   [facility]   Phone: [phone]

## 2024-01-28 ENCOUNTER — HEALTH MAINTENANCE LETTER (OUTPATIENT)
Age: 64
End: 2024-01-28

## 2024-05-02 ENCOUNTER — OFFICE VISIT (OUTPATIENT)
Dept: FAMILY MEDICINE | Facility: CLINIC | Age: 64
End: 2024-05-02
Payer: MEDICARE

## 2024-05-02 DIAGNOSIS — D18.01 CHERRY ANGIOMA: ICD-10-CM

## 2024-05-02 DIAGNOSIS — L71.9 ACNE ROSACEA: ICD-10-CM

## 2024-05-02 DIAGNOSIS — L82.1 SK (SEBORRHEIC KERATOSIS): ICD-10-CM

## 2024-05-02 DIAGNOSIS — L81.4 LENTIGINES: ICD-10-CM

## 2024-05-02 DIAGNOSIS — L57.0 AK (ACTINIC KERATOSIS): ICD-10-CM

## 2024-05-02 DIAGNOSIS — D49.2 NEOPLASM OF SKIN: ICD-10-CM

## 2024-05-02 DIAGNOSIS — D22.9 MULTIPLE BENIGN NEVI: Primary | ICD-10-CM

## 2024-05-02 PROCEDURE — 99214 OFFICE O/P EST MOD 30 MIN: CPT | Mod: 25 | Performed by: PHYSICIAN ASSISTANT

## 2024-05-02 PROCEDURE — 11102 TANGNTL BX SKIN SINGLE LES: CPT | Performed by: PHYSICIAN ASSISTANT

## 2024-05-02 PROCEDURE — 17000 DESTRUCT PREMALG LESION: CPT | Mod: XS | Performed by: PHYSICIAN ASSISTANT

## 2024-05-02 PROCEDURE — 88305 TISSUE EXAM BY PATHOLOGIST: CPT | Mod: 26 | Performed by: DERMATOLOGY

## 2024-05-02 NOTE — PROGRESS NOTES
[facility] Dermatology Note  Encounter Date: May 2, 2024  Office Visit      Dermatology Problem List:  FBSE: 5/2/24    #NUB, R medial posterior thigh, S/p Biopsy performed on 5/2/24: Pending results.   Acne rosacea  - Tx: metronidazole cream twice daily   2. AK s/p cryo  3. Hermes     Social: Retired.   ____________________________________________    Assessment & Plan:  # Neoplasm of unspecified behavior of the skin (D49.2) on the R medial posterior thigh. The differential diagnosis includes BCC vs BLK vs emerging SK vs other.   - Shave biopsy performed today, see procedure note below.   - Photographed today     # Hermes, forehead  - Discussed the natural history and benign nature of this lesion. Reassurance provided that no additional treatment is necessary.       # AK x1 on the nasal dorsum  - cryotherapy performed, see procedure note below.     # Acne Rosacea, stable chronic problem  - Continue use of metronidazole cream, Refilled Rx and sent to her pharmacy.      # Benign findings: multiple benign nevi, lentigines, cherry angiomas, SKs, dermatofibroma   - edu on benign etiology  - Signs and Symptoms of non-melanoma skin cancer and ABCDEs of melanoma reviewed with patient. Patient encouraged to perform monthly self skin exams and educated on how to perform them. UV precautions reviewed with patient. Patient was asked about new or changing moles/lesions on body.   - Sunscreen: Apply 20 minutes prior to going outdoors and reapply every two hours, when wet or sweating. We recommend using an SPF 30 or higher, and to use one that is water resistant.     - RTC for changes    Procedures Performed:   - Shave biopsy procedure note, location(s): see above. After discussion of benefits and risks including but not limited to bleeding, infection, scar, incomplete removal, recurrence, and non-diagnostic biopsy, written consent and photographs were obtained. The area was cleaned with isopropyl alcohol. 0.5mL  of 1% lidocaine with epinephrine was injected to obtain adequate anesthesia of lesion(s). Shave biopsy at site(s) performed. Hemostasis was achieved with aluminium chloride. Petrolatum ointment and a sterile dressing were applied. The patient tolerated the procedure and no complications were noted. The patient was provided with verbal and written post care instructions.      Follow-up: pending path results    Staff and scribe:    Scribe Disclosure:   I, NONA CLARK, am serving as a scribe; to document services personally performed by Nicky Taylor PA-C -based on data collection and the provider's statements to me.     Provider Disclosure:  I agree with above History, Review of Systems, Physical exam and Plan.  I have reviewed the content of the documentation and have edited it as needed. I have personally performed the services documented here and the documentation accurately represents those services and the decisions I have made.      Electronically signed by:    All risks, benefits and alternatives were discussed with patient.  Patient is in agreement and understands the assessment and plan.  All questions were answered.    Nicky Taylor PA-C, MPAS  [facility] [place]: Phone: [phone], Fax: [phone]  [facility]: Phone: [phone],  Fax: [phone]  [facility]: Phone: [phone], Fax: [phone]  ____________________________________________    CC: Skin Check ([facility]SC)      Reviewed patients past medical history and pertinent chart review prior to patient's visit today.     HPI:  Ms. Alfonzo Cho is a 64 year old female who presents today as a return patient for FBSC.     Today patient reported some dry spots behind her knees.     Denied any personal or family hx of skin cancer.     Patient is otherwise feeling well, without additional concerns.    Labs:  N/A    Physical Exam:  Vitals: LMP  05/01/2009   SKIN: Full skin, which includes the head/face, both arms, chest, back, abdomen,both legs, genitalia and/or groin buttocks, digits and/or nails, was examined.   -  Mata's skin type II, has <100 nevi  - There are dome shaped bright red papules on the trunk.   - Multiple regular brown pigmented macules and papules are identified on the trunk and extremities.   - Scattered brown macules on sun exposed areas.  - There are waxy stuck on tan to brown papules on the trunk.    - There is an erythematous macule with overyling adherent scale on the:  x 1 nasal dorsum.   - There are white 1-2 mm papules on the forehead.   - R medial posterior thigh there is a 6 mm pink scaly papule   - There is a symmetric papule with peripheral tan pigmentation that dimple with lateral pressure on the R medial thigh x1  - No other lesions of concern on areas examined.               Medications:  Current Outpatient Medications   Medication Sig Dispense Refill    metroNIDAZOLE (METROCREAM) 0.75 % external cream Apply topically 2 times daily 45 g 11    Omega-3 1000 MG capsule Take 1 g by mouth daily [facility] Easy Tears      cholecalciferol (VITAMIN D3) 25 mcg (1000 units) capsule        No current facility-administered medications for this visit.      Past Medical/Surgical History:   Patient Active Problem List   Diagnosis    Pain in joint, pelvic region and thigh    Seasonal allergic rhinitis    Symptomatic menopausal or female climacteric states    Sleep apnea    history of Thrombophlebitis leg (H)    Plantar fasciitis    Seborrheic dermatitis    Infrapatellar bursitis    Low blood pressure- usual - asymptomatic- 90/60 = baseline    Family history of hypothyroidism- father    Memory difficulties- some short-term, worse at work    Aortic calcification (H24)    Nonrheumatic aortic valve insufficiency - mild - no symptoms.     BMI 28.0-28.9,adult    Asymptomatic varicose veins    Gastroesophageal reflux disease, esophagitis  presence not specified    Inadequate sleep hygiene     Past Medical History:   Diagnosis Date    Aortic calcification (H24) 4/22/2016    Asymptomatic varicose veins     Contact dermatitis and other eczema, due to unspecified cause     seasonally    Infrapatellar bursitis     Nonrheumatic aortic valve insufficiency - mild - no symptoms.     incidental finding on thoracic XR    Papanicolaou smear of cervix with atypical squamous cells of undetermined significance (ASC-US) 07/01/16    Neg HR HPV    Phlebitis and thrombophlebitis of femoral vein (deep) (superficial) (H) not candidate for HRT     popliteal after progesterone oral therapy - no other clots     Sleep apnea     now on CPAP

## 2024-05-02 NOTE — LETTER
5/2/2024         RE: Alfonzo Cho  [address] [address] [address]        Dear Colleague,    Thank you for referring your patient, Alfonzo Cho, to the [facility]. Please see a copy of my visit note below.    [facility] Dermatology Note  Encounter Date: May 2, 2024  Office Visit      Dermatology Problem List:  FBSE: 5/2/24    #NUB, R medial posterior thigh, S/p Biopsy performed on 5/2/24: Pending results.   Acne rosacea  - Tx: metronidazole cream twice daily   2. AK s/p cryo  3. Milia     Social: Retired.   ____________________________________________    Assessment & Plan:  # Neoplasm of unspecified behavior of the skin (D49.2) on the R medial posterior thigh. The differential diagnosis includes BCC vs BLK vs emerging SK vs other.   - Shave biopsy performed today, see procedure note below.   - Photographed today     # Milia, forehead  - Discussed the natural history and benign nature of this lesion. Reassurance provided that no additional treatment is necessary.       # AK x1 on the nasal dorsum  - cryotherapy performed, see procedure note below.     # Acne Rosacea, stable chronic problem  - Continue use of metronidazole cream, Refilled Rx and sent to her pharmacy.      # Benign findings: multiple benign nevi, lentigines, cherry angiomas, SKs, dermatofibroma   - edu on benign etiology  - Signs and Symptoms of non-melanoma skin cancer and ABCDEs of melanoma reviewed with patient. Patient encouraged to perform monthly self skin exams and educated on how to perform them. UV precautions reviewed with patient. Patient was asked about new or changing moles/lesions on body.   - Sunscreen: Apply 20 minutes prior to going outdoors and reapply every two hours, when wet or sweating. We recommend using an SPF 30 or higher, and to use one that is water resistant.     - RTC for changes    Procedures Performed:   - Shave biopsy procedure note, location(s): see  above. After discussion of benefits and risks including but not limited to bleeding, infection, scar, incomplete removal, recurrence, and non-diagnostic biopsy, written consent and photographs were obtained. The area was cleaned with isopropyl alcohol. 0.5mL of 1% lidocaine with epinephrine was injected to obtain adequate anesthesia of lesion(s). Shave biopsy at site(s) performed. Hemostasis was achieved with aluminium chloride. Petrolatum ointment and a sterile dressing were applied. The patient tolerated the procedure and no complications were noted. The patient was provided with verbal and written post care instructions.      Follow-up: pending path results    Staff and scribe:    Scribe Disclosure:   I, NONA CLARK, am serving as a scribe; to document services personally performed by Nicky Taylor PA-C -based on data collection and the provider's statements to me.     Provider Disclosure:  I agree with above History, Review of Systems, Physical exam and Plan.  I have reviewed the content of the documentation and have edited it as needed. I have personally performed the services documented here and the documentation accurately represents those services and the decisions I have made.      Electronically signed by:    All risks, benefits and alternatives were discussed with patient.  Patient is in agreement and understands the assessment and plan.  All questions were answered.    Nicky Taylor PA-C, MPAS  [facility]: Phone: [phone], Fax: [phone]  [facility]: Phone: [phone],  Fax: [phone]  [facility]: Phone: [phone], Fax: [phone]  ____________________________________________    CC: Skin Check (FBSC)      Reviewed patients past medical history and pertinent chart review prior to patient's visit today.     HPI:  Ms. Alfonzo Cho is a 64 year old female who presents  today as a [place] [facility]     Today patient reported some dry spots behind her knees.     Denied any personal or family hx of skin cancer.     Patient is otherwise feeling well, without additional concerns.    Labs:  N/A    Physical Exam:  Vitals: LMP 05/01/2009   SKIN: Full skin, which includes the head/face, both arms, chest, back, abdomen,both legs, genitalia and/or groin buttocks, digits and/or nails, was examined.   -  Mata's skin type II, has <100 nevi  - There are dome shaped bright red papules on the trunk.   - Multiple regular brown pigmented macules and papules are identified on the trunk and extremities.   - Scattered brown macules on sun exposed areas.  - There are waxy stuck on tan to brown papules on the trunk.    - There is an erythematous macule with overyling adherent scale on the:  x 1 nasal dorsum.   - There are white 1-2 mm papules on the forehead.   - R medial posterior thigh there is a 6 mm pink scaly papule   - There is a symmetric papule with peripheral tan pigmentation that dimple with lateral pressure on the R medial thigh x1  - No other lesions of concern on areas examined.               Medications:  Current Outpatient Medications   Medication Sig Dispense Refill     metroNIDAZOLE (METROCREAM) 0.75 % external cream Apply topically 2 times daily 45 g 11     Omega-3 1000 MG capsule Take 1 g by mouth daily [facility] Easy Tears       cholecalciferol (VITAMIN D3) 25 mcg (1000 units) capsule        No current facility-administered medications for this visit.      Past Medical/Surgical History:   Patient Active Problem List   Diagnosis     Pain in joint, pelvic region and thigh     Seasonal allergic rhinitis     Symptomatic menopausal or female climacteric states     Sleep apnea     history of Thrombophlebitis leg (H)     Plantar fasciitis     Seborrheic dermatitis     Infrapatellar bursitis     Low blood pressure- usual - asymptomatic- 90/60 = baseline     Family history of  hypothyroidism- father     Memory difficulties- some short-term, worse at work     Aortic calcification (H24)     Nonrheumatic aortic valve insufficiency - mild - no symptoms.      BMI 28.0-28.9,adult     Asymptomatic varicose veins     Gastroesophageal reflux disease, esophagitis presence not specified     Inadequate sleep hygiene     Past Medical History:   Diagnosis Date     Aortic calcification (H24) 4/22/2016     Asymptomatic varicose veins      Contact dermatitis and other eczema, due to unspecified cause     seasonally     Infrapatellar bursitis      Nonrheumatic aortic valve insufficiency - mild - no symptoms.     incidental finding on thoracic XR     Papanicolaou smear of cervix with atypical squamous cells of undetermined significance (ASC-US) 07/01/16    Neg HR HPV     Phlebitis and thrombophlebitis of femoral vein (deep) (superficial) (H) not candidate for HRT     popliteal after progesterone oral therapy - no other clots      Sleep apnea     now on CPAP                         Again, thank you for allowing me to participate in the care of your patient.        Sincerely,        Nicky Taylor PA-C

## 2024-05-02 NOTE — PATIENT INSTRUCTIONS
Wound Care After a Biopsy    What is a skin biopsy?  A skin biopsy allows the doctor to examine a very small piece of tissue under the microscope to determine the diagnosis and the best treatment for the skin condition. A local anesthetic (numbing medicine)  is injected with a very small needle into the skin area to be tested. A small piece of skin is taken from the area. Sometimes a suture (stitch) is used.     What are the risks of a skin biopsy?  I will experience scar, bleeding, swelling, pain, crusting and redness. I may experience incomplete removal or recurrence. Risks of this procedure are excessive bleeding, bruising, infection, nerve damage, numbness, thick (hypertrophic or keloidal) scar and non-diagnostic biopsy.    How should I care for my wound for the first 24 hours?  Keep the wound dry and covered for 24 hours  If it bleeds, hold direct pressure on the area for 15 minutes. If bleeding does not stop then go to the emergency room  Avoid strenuous exercise the first 1-2 days or as your doctor instructs you    How should I care for the wound after 24 hours?  After 24 hours, remove the bandage  You may bathe or shower as normal  If you had a scalp biopsy, you can shampoo as usual and can use shower water to clean the biopsy site daily  Clean the wound twice a day with gentle soap and water  Do not scrub, be gentle  Apply white petroleum/Vaseline after cleaning the wound with a cotton swab or a clean finger, and keep the site covered with a Bandaid /bandage. Bandages are not necessary with a scalp biopsy  If you are unable to cover the site with a Bandaid /bandage, re-apply ointment 2-3 times a day to keep the site moist. Moisture will help with healing  Avoid strenuous activity for first 1-2 days  Avoid lakes, rivers, pools, and oceans until the stitches are removed or the site is healed    How do I clean my wound?  Wash hands thoroughly with soap or use hand  before all wound care  Clean the  wound with gentle soap and water  Apply white petroleum/Vaseline  to wound after it is clean  Replace the Bandaid /bandage to keep the wound covered for the first few days or as instructed by your doctor  If you had a scalp biopsy, warm shower water to the area on a daily basis should suffice    What should I use to clean my wound?   Cotton-tipped applicators (Qtips )  White petroleum jelly (Vaseline ). Use a clean new container and use Q-tips to apply.  Bandaids   as needed  Gentle soap     How should I care for my wound long term?  Do not get your wound dirty  Keep up with wound care for one week or until the area is healed.  A small scab will form and fall off by itself when the area is completely healed. The area will be red and will become pink in color as it heals. Sun protection is very important for how your scar will turn out. Sunscreen with an SPF 30 or greater is recommended once the area is healed.  If you have stitches, stitches need to be removed in 10 days. You may return to our clinic for this or you may have it done locally at your doctor s office.  You should have some soreness but it should be mild and slowly go away over several days. Talk to your doctor about using tylenol for pain,    When should I call my doctor?  If you have increased:   Pain or swelling  Pus or drainage (clear or slightly yellow drainage is ok)  Temperature over 100F  Spreading redness or warmth around wound    When will I hear about my results?  The biopsy results can take 2-3 weeks to come back. The clinic will call you with the results, send you a [employer] message, or have you schedule a follow-up clinic or phone time to discuss the results. Contact our clinics if you do not hear from us in 3 weeks.     Who should I call with questions?  [facility]: [phone]   [facility]: [phone]  For urgent needs outside of business hours call the [facility]  at [phone] and ask for the dermatology resident on call      Patient Education       Proper skin care from [facility]:    -Eliminate harsh soaps as they strip the natural oils from the skin, often resulting in dry itchy skin ( i.e. Dial, Zest, Bhavna Spring)  -Use mild soaps such as Cetaphil or Dove Sensitive Skin in the shower. You do not need to use soap on arms, legs, and trunk every time you shower unless visibly soiled.   -Avoid hot or cold showers.  -After showering, lightly dry off and apply moisturizing within 2-3 minutes. This will help trap moisture in the skin.   -Aggressive use of a moisturizer at least 1-2 times a day to the entire body (including -Vanicream, Cetaphil, Aquaphor or Cerave) and moisturize hands after every washing.  -We recommend using moisturizers that come in a tub that needs to be scooped out, not a pump. This has more of an oil base. It will hold moisture in your skin much better than a water base moisturizer. The above recommended are non-pore clogging.      Wear a sunscreen with at least SPF 30 on your face, ears, neck and V of the chest daily. Wear sunscreen on other areas of the body if those areas are exposed to the sun throughout the day. Sunscreens can contain physical and/or chemical blockers. Physical blockers are less likely to clog pores, these include zinc oxide and titanium dioxide. Reapply every two hour and after swimming.     Sunscreen examples: https://www.ewg.org/sunscreen/    UV radiation  UVA radiation remains constant throughout the day and throughout the year. It is a longer wavelength than UVB and therefore penetrates deeper into the skin leading to immediate and delayed tanning, photoaging, and skin cancer. 70-80% of UVA and UVB radiation occurs between the hours of 10am-2pm.  UVB radiation  UVB radiation causes the most harmful effects and is more significant during the summer months. However, snow and ice can reflect UVB radiation leading to  skin damage during the winter months as well. UVB radiation is responsible for tanning, burning, inflammation, delayed erythema (pinkness), pigmentation (brown spots), and skin cancer.     I recommend self monthly full body exams and yearly full body exams with a dermatology provider. If you develop a new or changing lesion please follow up for examination. Most skin cancers are pink and scaly or pink and pearly. However, we do see blue/brown/black skin cancers.  Consider the ABCDEs of melanoma when giving yourself your monthly full body exam ( don't forget the groin, buttocks, feet, toes, etc). A-asymmetry, B-borders, C-color, D-diameter, E-elevation or evolving. If you see any of these changes please follow up in clinic. If you cannot see your back I recommend purchasing a hand held mirror to use with a larger wall mirror.       Checking for Skin Cancer  You can find cancer early by checking your skin each month. There are 3 kinds of skin cancer. They are melanoma, basal cell carcinoma, and squamous cell carcinoma. Doing monthly skin checks is the best way to find new marks or skin changes. Follow the instructions below for checking your skin.   The ABCDEs of checking moles for melanoma   Check your moles or growths for signs of melanoma using ABCDE:   Asymmetry: the sides of the mole or growth don t match  Border: the edges are ragged, notched, or blurred  Color: the color within the mole or growth varies  Diameter: the mole or growth is larger than 6 mm (size of a pencil eraser)  Evolving: the size, shape, or color of the mole or growth is changing (evolving is not shown in the images below)    Checking for other types of skin cancer  Basal cell carcinoma or squamous cell carcinoma have symptoms such as:     A spot or mole that looks different from all other marks on your skin  Changes in how an area feels, such as itching, tenderness, or pain  Changes in the skin's surface, such as oozing, bleeding, or  scaliness  A sore that does not heal  New swelling or redness beyond the border of a mole    Who s at risk?  Anyone can get skin cancer. But you are at greater risk if you have:   Fair skin, light-colored hair, or light-colored eyes  Many moles or abnormal moles on your skin  A history of sunburns from sunlight or tanning beds  A family history of skin cancer  A history of exposure to radiation or chemicals  A weakened immune system  If you have had skin cancer in the past, you are at risk for recurring skin cancer.   How to check your skin  Do your monthly skin checkups in front of a full-length mirror. Check all parts of your body, including your:   Head (ears, face, neck, and scalp)  Torso (front, back, and sides)  Arms (tops, undersides, upper, and lower armpits)  Hands (palms, backs, and fingers, including under the nails)  Buttocks and genitals  Legs (front, back, and sides)  Feet (tops, soles, toes, including under the nails, and between toes)  If you have a lot of moles, take digital photos of them each month. Make sure to take photos both up close and from a distance. These can help you see if any moles change over time.   Most skin changes are not cancer. But if you see any changes in your skin, call your doctor right away. Only he or she can diagnose a problem. If you have skin cancer, seeing your doctor can be the first step toward getting the treatment that could save your life.   [employer] last reviewed this educational content on 4/1/2019 2000-2020 [employer]. [address] [place] [address], [place] [address]. All rights reserved. This information is not intended as a substitute for professional medical care. Always follow your healthcare professional's instructions.       When should I call my doctor?  If you are worsening or not improving, please, contact us or seek urgent care as noted below.     Who should I call with questions (adults)?  [facility] (adult  and pediatric): [phone]  [facility] (adult): [phone]  [facility] ([place], [place], [place] and [place]) [phone]  For urgent needs outside of business hours call the [facility] at [phone] and ask for the dermatology resident on call to be paged  If this is a medical emergency and you are unable to reach an ER, Call [phone]      If you need a prescription refill, please contact your pharmacy. Refills are approved or denied by our Physicians during normal business hours, Monday through Fridays  Per office policy, refills will not be granted if you have not been seen within the past year (or sooner depending on your child's condition)

## 2024-05-06 LAB
PATH REPORT.COMMENTS IMP SPEC: NORMAL
PATH REPORT.COMMENTS IMP SPEC: NORMAL
PATH REPORT.FINAL DX SPEC: NORMAL
PATH REPORT.GROSS SPEC: NORMAL
PATH REPORT.MICROSCOPIC SPEC OTHER STN: NORMAL
PATH REPORT.RELEVANT HX SPEC: NORMAL

## 2024-05-24 ENCOUNTER — TELEPHONE (OUTPATIENT)
Dept: FAMILY MEDICINE | Facility: CLINIC | Age: 64
End: 2024-05-24
Payer: MEDICARE

## 2024-05-24 NOTE — TELEPHONE ENCOUNTER
Reason for Call:  Appointment Request    Patient requesting this type of appt:  Office Visit     Requested provider: Alba Iraheta and open to other providers     Reason patient unable to be scheduled: Not within requested timeframe    When does patient want to be seen/preferred time:  ASAP -- open to timeframe, but does not want to until next available (end of June)    Comments: Shoulder pain L for approx 1m    Could we send this information to you in [facility] [employer] or would you prefer to receive a phone call?:   Patient would like to be contacted via [employer] [facility]    Call taken on 5/24/2024 at 3:08 PM by Kelly Walker

## 2024-05-24 NOTE — TELEPHONE ENCOUNTER
Called and scheduled for next week.     Kristin Mota RN [facility]

## 2024-05-28 ENCOUNTER — ANCILLARY PROCEDURE (OUTPATIENT)
Dept: GENERAL RADIOLOGY | Facility: CLINIC | Age: 64
End: 2024-05-28
Attending: NURSE PRACTITIONER
Payer: MEDICARE

## 2024-05-28 ENCOUNTER — OFFICE VISIT (OUTPATIENT)
Dept: FAMILY MEDICINE | Facility: CLINIC | Age: 64
End: 2024-05-28
Payer: MEDICARE

## 2024-05-28 VITALS
BODY MASS INDEX: 26.18 KG/M2 | OXYGEN SATURATION: 100 % | TEMPERATURE: 97.8 F | DIASTOLIC BLOOD PRESSURE: 60 MMHG | HEIGHT: 71 IN | WEIGHT: 187 LBS | HEART RATE: 74 BPM | SYSTOLIC BLOOD PRESSURE: 104 MMHG | RESPIRATION RATE: 18 BRPM

## 2024-05-28 DIAGNOSIS — M25.512 ACUTE PAIN OF LEFT SHOULDER: ICD-10-CM

## 2024-05-28 DIAGNOSIS — M25.512 ACUTE PAIN OF LEFT SHOULDER: Primary | ICD-10-CM

## 2024-05-28 PROCEDURE — 73030 X-RAY EXAM OF SHOULDER: CPT | Mod: TC | Performed by: RADIOLOGY

## 2024-05-28 PROCEDURE — 99213 OFFICE O/P EST LOW 20 MIN: CPT | Performed by: NURSE PRACTITIONER

## 2024-05-28 RX ORDER — NAPROXEN 500 MG/1
500 TABLET ORAL 2 TIMES DAILY WITH MEALS
Qty: 45 TABLET | Refills: 0 | Status: SHIPPED | OUTPATIENT
Start: 2024-05-28

## 2024-05-28 NOTE — PROGRESS NOTES
"  Assessment & Plan     Alfonzo was seen today for shoulder pain.    Diagnoses and all orders for this visit:    Acute pain of left shoulder  Tendonitis vs. Rotator cuff tear vs. Arthritis   -     XR Shoulder Left G/E 3 Views; Future - no acute findings, mild arthritis at AC joint.    -     Physical Therapy  Referral; Future  -     naproxen (NAPROSYN) 500 MG tablet; Take 1 tablet (500 mg) by mouth 2 times daily (with meals)  -     Orthopedic  Referral; Future  Recommend Naproxen scheduled two times daily for 5-7 days and initiation of physical therapy.  With no improvement or worsening plan further consultation with orthopedics and consideration for MRI.        BMI  Estimated body mass index is 26.45 kg/m  as calculated from the following:    Height as of this encounter: 1.791 m (5' 10.5\").    Weight as of this encounter: 84.8 kg (187 lb).     Return in about 6 weeks (around 7/9/2024) for Medicare Wellness Visit .    Subjective   Alfonzo is a 64 year old, presenting for the following health issues:  Shoulder Pain        5/28/2024     1:42 PM   Additional Questions   Roomed by Lizzy HADDAD     History of Present Illness       Reason for visit:  Left Shoulder/front of upper arm pain, it was in the right as well but that has improved  Symptom onset:  More than a month (No know injury, only thing she can think of is holding her grand daughter for a while, didn't hurt immediately after carrying her, so not sure if related)  Symptoms include:  Pain waking me at night, with movement, snapping sensation in tendon(?)  Symptom intensity:  Moderate  Symptom progression:  Improving  Had these symptoms before:  No  What makes it worse:  Reaching behind me, trying to raise my arm  What makes it better:  Rest, NSAIDs, finding a comfortable position    She eats 2-3 servings of fruits and vegetables daily.She consumes 0 sweetened beverage(s) daily.She exercises with enough effort to increase her heart rate 20 to 29 minutes " "per day.  She exercises with enough effort to increase her heart rate 3 or less days per week.   She is taking medications regularly.     Started with bilateral shoulder pain.  Right shoulder pain has mostly resolved, but left shoulder pain is still present. Aggravated with reaching overhear and around - difficulty with lifting arm up.  Slight improvement.   No injury or trauma.  This started early April.    Was carrying around 18 month old granddaughter for about 40 minutes.    Has been taking Aspirin or Ibuprofen which helps some, takes the edge off.          Review of Systems  Constitutional, HEENT, cardiovascular, pulmonary, gi and gu systems are negative, except as otherwise noted.      Objective    /60   Pulse 74   Temp 97.8  F (36.6  C)   Resp 18   Ht 1.791 m (5' 10.5\")   Wt 84.8 kg (187 lb)   LMP 05/01/2009   SpO2 100%   BMI 26.45 kg/m    Body mass index is 26.45 kg/m .    Physical Exam     GENERAL: alert and no distress  MS: range of motion limited due to pain,  limited to 0 and 180 degrees, anterior joint is tender to palpation extending down to deltoid region  NEURO: Normal strength and tone, mentation intact and speech normal  PSYCH: mentation appears normal, affect normal/bright        Signed Electronically by: Alba Iraheta, LORENZO CNP    "

## 2024-05-28 NOTE — RESULT ENCOUNTER NOTE
Dear Alfonzo,     The radiologist read your shoulder x-ray and noted no acute bony abnormalities and mild arthritis.      Thank you,     Alba Iraheta, APRN, CNP  [facility]

## 2024-05-31 ASSESSMENT — ACTIVITIES OF DAILY LIVING (ADL)
WHEN_LYING_ON_THE_INVOLVED_SIDE: 4
PUTTING_ON_AN_UNDERSHIRT_OR_A_PULLOVER_SWEATER: 8
TOUCHING_THE_BACK_OF_YOUR_NECK: 5
REACHING_FOR_SOMETHING_ON_A_HIGH_SHELF: 7
WASHING_YOUR_HAIR?: 9
PUTTING_ON_A_SHIRT_THAT_BUTTONS_DOWN_THE_FRONT: 2
PLACING_AN_OBJECT_ON_A_HIGH_SHELF: 8
PLEASE_INDICATE_YOR_PRIMARY_REASON_FOR_REFERRAL_TO_THERAPY:: SHOULDER
CARRYING_A_HEAVY_OBJECT_OF_10_POUNDS: 3
REMOVING_SOMETHING_FROM_YOUR_BACK_POCKET: 9
AT_ITS_WORST?: 7
PUTTING_ON_YOUR_PANTS: 4
WASHING_YOUR_BACK: 10
PUSHING_WITH_THE_INVOLVED_ARM: 1

## 2024-06-05 ENCOUNTER — THERAPY VISIT (OUTPATIENT)
Dept: PHYSICAL THERAPY | Facility: CLINIC | Age: 64
End: 2024-06-05
Attending: NURSE PRACTITIONER
Payer: MEDICARE

## 2024-06-05 DIAGNOSIS — M25.512 ACUTE PAIN OF LEFT SHOULDER: ICD-10-CM

## 2024-06-05 PROCEDURE — 97110 THERAPEUTIC EXERCISES: CPT | Mod: GP | Performed by: PHYSICAL THERAPIST

## 2024-06-05 PROCEDURE — 97112 NEUROMUSCULAR REEDUCATION: CPT | Mod: GP | Performed by: PHYSICAL THERAPIST

## 2024-06-05 PROCEDURE — 97161 PT EVAL LOW COMPLEX 20 MIN: CPT | Mod: GP | Performed by: PHYSICAL THERAPIST

## 2024-06-05 NOTE — PROGRESS NOTES
PHYSICAL THERAPY EVALUATION  Type of Visit: Evaluation    See electronic medical record for Abuse and Falls Screening details.    Subjective  Pt reports that she started getting pain in the anterior left shoulder with some snapping for a few months now without known injury. Possibly related to lifting granddaughter. Pain is worst with reaching, lifting, and various positions. Denies vague symptoms. Would like to get rid of this pain and return to PLOF pain free. Wants to get back daily life tasks pain free, without feeling limited by her shoulder.         Presenting condition or subjective complaint: Pain in shoulder specifically front of upper arm  Date of onset: 06/05/24    Relevant medical history:     Dates & types of surgery:      Prior diagnostic imaging/testing results: X-ray     Prior therapy history for the same diagnosis, illness or injury: No      Living Environment  Social support:     Type of home:     Stairs to enter the home:         Ramp:     Stairs inside the home:         Help at home:    Equipment owned:       Employment:      Hobbies/Interests:      Patient goals for therapy: Wash, groom hair and dress without pain. Reach up or behind without pain.    Pain assessment: Location: Left Shoulder/Rating: can be sharp     Objective   Posture: forward head, rounded shoulders    Scapular positioning: NT    *=painful    Shoulder AROM (* = pain) Right Left           100*    90*   ER/HBH 70 30*   IR/HBB T12 Left glute*     Shoulder PROM (* = pain) Right Left    100*    100*   ER In 90 abduction 90 In 80 abduction 50   IR In 90 abduction 70 In 80 abduction 35*     Shoulder Strength (* = pain) Right Left   FL 5/5 NT/5   ABD 5/5 NT/5   ER (0 abduction) 5/5 4+/5   ER (90 abduction) NT/5 NT/5   IR 5/5 5-/5   Biceps 5/5 5/5               Palpation tenderness: unremarkable    Other Tests: negative cervical screen, negative elbow screen  Assessment & Plan   CLINICAL IMPRESSIONS  Medical  Diagnosis: Acute pain of left shoulder    Treatment Diagnosis: Acute pain of left shoulder   Impression/Assessment: Patient is a 64 year old female with Left Shoulder complaints.  The following significant findings have been identified: Pain, Decreased ROM/flexibility, Decreased joint mobility, Decreased strength, Impaired muscle performance, Decreased activity tolerance, and Impaired posture. These impairments interfere with their ability to perform self care tasks, work tasks, recreational activities, household chores, driving , household mobility, and community mobility as compared to previous level of function.     Clinical Decision Making (Complexity):  Clinical Presentation: Stable/Uncomplicated  Clinical Presentation Rationale: based on medical and personal factors listed in PT evaluation  Clinical Decision Making (Complexity): Low complexity    PLAN OF CARE  Treatment Interventions:  Interventions: Gait Training, Manual Therapy, Neuromuscular Re-education, Therapeutic Activity, Therapeutic Exercise, Self-Care/Home Management    Long Term Goals     PT Goal 1  Goal Identifier: Reaching  Goal Description: Pt will be able to reach overhead, out to the side, behind the back and cross body pain free in order to reach cabinets, for dressing, and ADls  Rationale: to maximize safety and independence with performance of ADLs and functional tasks;to maximize safety and independence within the home;to maximize safety and independence within the community;to maximize safety and independence with transportation;to maximize safety and independence with self cares  Goal Progress: new goal  Target Date: 08/14/24  PT Goal 2  Goal Identifier: Lifting  Goal Description: Pt will be able to lift > 5 pounds overhead pain free in order to place things into high spaces with ADLs at home, and perform daily tasks at home  Rationale: to maximize safety and independence with performance of ADLs and functional tasks;to maximize safety  and independence within the home;to maximize safety and independence within the community;to maximize safety and independence with transportation;to maximize safety and independence with self cares  Goal Progress: new goal  Target Date: 08/14/24      Frequency of Treatment: 1 x week  Duration of Treatment: 10 weeks    Recommended Referrals to Other Professionals: none  Education Assessment:   Learner/Method: Patient;No Barriers to Learning  Education Comments: no concerns    Risks and benefits of evaluation/treatment have been explained.   Patient/Family/caregiver agrees with Plan of Care.     Evaluation Time:     PT Eval, Low Complexity Minutes (67633): 10     Signing Clinician: Abiodun Weems PT      [facility]                                                                                   OUTPATIENT PHYSICAL THERAPY      PLAN OF TREATMENT FOR OUTPATIENT REHABILITATION   Patient's Last Name, First Name, Alfonzo Linder YOB: 1960   Provider's Name   [facility]   Medical Record No.  [id number]     Onset Date: 06/05/24  Start of Care Date: 06/05/24     Medical Diagnosis:  Acute pain of left shoulder      PT Treatment Diagnosis:  Acute pain of left shoulder Plan of Treatment  Frequency/Duration: 1 x week/ 10 weeks    Certification date from 06/05/24 to 08/14/24         See note for plan of treatment details and functional goals     Abiodun Weems PT                         I CERTIFY THE NEED FOR THESE SERVICES FURNISHED UNDER        THIS PLAN OF TREATMENT AND WHILE UNDER MY CARE     (Physician attestation of this document indicates review and certification of the therapy plan).              Referring Provider:  Alba Iraheta    Initial Assessment  See Epic Evaluation- Start of Care Date: 06/05/24

## 2024-06-18 ENCOUNTER — THERAPY VISIT (OUTPATIENT)
Dept: PHYSICAL THERAPY | Facility: CLINIC | Age: 64
End: 2024-06-18
Attending: NURSE PRACTITIONER
Payer: MEDICARE

## 2024-06-18 DIAGNOSIS — M25.512 ACUTE PAIN OF LEFT SHOULDER: Primary | ICD-10-CM

## 2024-06-18 PROCEDURE — 97112 NEUROMUSCULAR REEDUCATION: CPT | Mod: GP | Performed by: PHYSICAL THERAPIST

## 2024-06-18 PROCEDURE — 97110 THERAPEUTIC EXERCISES: CPT | Mod: GP | Performed by: PHYSICAL THERAPIST

## 2024-07-25 ENCOUNTER — OFFICE VISIT (OUTPATIENT)
Dept: ORTHOPEDICS | Facility: CLINIC | Age: 64
End: 2024-07-25
Payer: MEDICARE

## 2024-07-25 ENCOUNTER — THERAPY VISIT (OUTPATIENT)
Dept: PHYSICAL THERAPY | Facility: CLINIC | Age: 64
End: 2024-07-25
Attending: NURSE PRACTITIONER
Payer: MEDICARE

## 2024-07-25 VITALS
SYSTOLIC BLOOD PRESSURE: 100 MMHG | WEIGHT: 187 LBS | HEIGHT: 71 IN | BODY MASS INDEX: 26.18 KG/M2 | DIASTOLIC BLOOD PRESSURE: 68 MMHG

## 2024-07-25 DIAGNOSIS — M75.02 ADHESIVE CAPSULITIS OF LEFT SHOULDER: ICD-10-CM

## 2024-07-25 DIAGNOSIS — M25.512 ACUTE PAIN OF LEFT SHOULDER: Primary | ICD-10-CM

## 2024-07-25 DIAGNOSIS — M75.22 BICEPS TENDINITIS OF LEFT UPPER EXTREMITY: ICD-10-CM

## 2024-07-25 DIAGNOSIS — M75.42 IMPINGEMENT SYNDROME OF LEFT SHOULDER: Primary | ICD-10-CM

## 2024-07-25 PROCEDURE — 20611 DRAIN/INJ JOINT/BURSA W/US: CPT | Mod: LT | Performed by: STUDENT IN AN ORGANIZED HEALTH CARE EDUCATION/TRAINING PROGRAM

## 2024-07-25 PROCEDURE — 97110 THERAPEUTIC EXERCISES: CPT | Mod: GP | Performed by: PHYSICAL THERAPIST

## 2024-07-25 PROCEDURE — 99204 OFFICE O/P NEW MOD 45 MIN: CPT | Mod: 25 | Performed by: STUDENT IN AN ORGANIZED HEALTH CARE EDUCATION/TRAINING PROGRAM

## 2024-07-25 RX ORDER — LIDOCAINE HYDROCHLORIDE 10 MG/ML
1 INJECTION, SOLUTION INFILTRATION; PERINEURAL
Status: SHIPPED | OUTPATIENT
Start: 2024-07-25

## 2024-07-25 RX ORDER — TRIAMCINOLONE ACETONIDE 40 MG/ML
40 INJECTION, SUSPENSION INTRA-ARTICULAR; INTRAMUSCULAR
Status: SHIPPED | OUTPATIENT
Start: 2024-07-25

## 2024-07-25 RX ORDER — ROPIVACAINE HYDROCHLORIDE 5 MG/ML
14 INJECTION, SOLUTION EPIDURAL; INFILTRATION; PERINEURAL
Status: SHIPPED | OUTPATIENT
Start: 2024-07-25

## 2024-07-25 RX ORDER — LIDOCAINE HYDROCHLORIDE 10 MG/ML
3 INJECTION, SOLUTION INFILTRATION; PERINEURAL
Status: SHIPPED | OUTPATIENT
Start: 2024-07-25

## 2024-07-25 RX ADMIN — LIDOCAINE HYDROCHLORIDE 1 ML: 10 INJECTION, SOLUTION INFILTRATION; PERINEURAL at 13:34

## 2024-07-25 RX ADMIN — LIDOCAINE HYDROCHLORIDE 3 ML: 10 INJECTION, SOLUTION INFILTRATION; PERINEURAL at 13:32

## 2024-07-25 RX ADMIN — ROPIVACAINE HYDROCHLORIDE 14 ML: 5 INJECTION, SOLUTION EPIDURAL; INFILTRATION; PERINEURAL at 13:32

## 2024-07-25 RX ADMIN — TRIAMCINOLONE ACETONIDE 40 MG: 40 INJECTION, SUSPENSION INTRA-ARTICULAR; INTRAMUSCULAR at 13:32

## 2024-07-25 RX ADMIN — TRIAMCINOLONE ACETONIDE 40 MG: 40 INJECTION, SUSPENSION INTRA-ARTICULAR; INTRAMUSCULAR at 13:34

## 2024-07-25 NOTE — PROGRESS NOTES
Patient is making some progress towards current goals with moderate improvements in range of motion and functional strength. Does remain with pain, worst at night and given current timeline of treatment is appropriate for further assessment with non-op sports med. Sees Dr. Wills later today for further assessment of her shoulder. Will continue updated HEP and return to PT in 1-2 months as needed.    PLAN  Continue therapy per current plan of care.     07/25/24 0500   Appointment Info   Signing clinician's name / credentials Abiodun Weems, PT, DPT   Total/Authorized Visits [id number]   Visits Used 3   Medical Diagnosis Acute pain of left shoulder   PT Tx Diagnosis Acute pain of left shoulder   Quick Adds Certification   Progress Note/Certification   Start of Care Date 06/05/24   Onset of illness/injury or Date of Surgery 06/05/24   Therapy Frequency 1 x week   Predicted Duration 10 weeks   Certification date from 06/05/24   Certification date to 08/14/24   Progress Note Due Date 08/14/24   Progress Note Completed Date 06/05/24   GOALS   PT Goals 2   PT Goal 1   Goal Identifier Reaching   Goal Description Pt will be able to reach overhead, out to the side, behind the back and cross body pain free in order to reach cabinets, for dressing, and ADls   Rationale to maximize safety and independence with performance of ADLs and functional tasks;to maximize safety and independence within the home;to maximize safety and independence within the community;to maximize safety and independence with transportation;to maximize safety and independence with self cares   Goal Progress progressing   Target Date 08/14/24   PT Goal 2   Goal Identifier Lifting   Goal Description Pt will be able to lift > 5 pounds overhead pain free in order to place things into high spaces with ADLs at home, and perform daily tasks at home   Rationale to maximize safety and independence with performance of ADLs and functional tasks;to maximize safety and  independence within the home;to maximize safety and independence within the community;to maximize safety and independence with transportation;to maximize safety and independence with self cares   Goal Progress progressing   Target Date 08/14/24   Subjective Report   Subjective Report Pt reports that things are going pretty good overall. Continued improvement, but its not gone just yet. Remains with pain while sleeping. Working on the exercises regularly.   Objective Measures   Objective Measures Objective Measure 1;Objective Measure 2;Objective Measure 3   Objective Measure 1   Objective Measure AROM:   Details Flexion 125, Abduction 80*, ER 60*, IR L5*   Objective Measure 2   Objective Measure MMT:   Details ER 4+/5*, IR 5/5*, Flexion 4/5*   Objective Measure 3   Objective Measure PROM   Details Flexion 110*,ER  in 90 abduction 50*,IR In 90 abduction 50*   Treatment Interventions (PT)   Interventions Therapeutic Procedure/Exercise;Neuromuscular Re-education   Therapeutic Procedure/Exercise   Therapeutic Procedures: strength, endurance, ROM, flexibility minutes (49723) 25   Therapeutic Procedures Ther Proc 2   Ther Proc 1 Education   Ther Proc 1 - Details discussed seeing what Dr. Wills thinks, education on possible anatomical explanations for current pain   PTRx Ther Proc 1 Shoulder External Rotation Sidelying   PTRx Ther Proc 1 - Details 1 pound x 30L good challenge overall    PTRx Ther Proc 2 Wand Shoulder Abduction Standing   PTRx Ther Proc 2 - Details x 10 with 5 sec holds L tolerates well overall    PTRx Ther Proc 3 Shoulder Scaption Full Can   PTRx Ther Proc 3 - Details progressed to 2 pounds x 15 with cues for form throughout    Skilled Intervention Cues for form and control throughout   Patient Response/Progress Tolerated initial HEP well   Neuromuscular Re-education   PTRx Neuro Re-ed 1 Scapular Retraction/Depression   PTRx Neuro Re-ed 1 - Details HEP   PTRx Neuro Re-ed 2 Rowing with Shoulders Up and  Back   PTRx Neuro Re-ed 2 - Details HEP   PTRx Neuro Re-ed 3 Shoulder Theraband Low Row/Pulldown   PTRx Neuro Re-ed 3 - Details HEP   PTRx Neuro Re-ed 4 Push-Up Plus At Counter   PTRx Neuro Re-ed 4 - Details x 20   Skilled Intervention cues for form   Patient Response/Progress tolerates well overall   Education   Learner/Method Patient;No Barriers to Learning   Education Comments no concerns   Plan   Home program Ptrx HEP   Plan for next session PN, return in PT in 1-2 months   Total Session Time   Timed Code Treatment Minutes 25   Total Treatment Time (sum of timed and untimed services) 25       Beginning/End Dates of Progress Note Reporting Period:  06/05/24 to 07/25/2024    Referring Provider:  Alba Iraheta

## 2024-07-25 NOTE — LETTER
7/25/2024      Alfonzo Cho  [address] [id number]      Dear Colleague,    Thank you for referring your patient, Alfonzo Cho, to the [facility] [place]. Please see a copy of my visit note below.    ASSESSMENT & PLAN    Alfonzo was seen today for pain.    Diagnoses and all orders for this visit:    Impingement syndrome of left shoulder  -     Sports Med Adult Follow-Up Clinic Order (As Needed); Future    Adhesive capsulitis of left shoulder  -     Sports Med Adult Follow-Up Clinic Order (As Needed); Future    Biceps tendinitis of left upper extremity  -     Sports Med Adult Follow-Up Clinic Order (As Needed); Future    Other orders  -     Large Joint Injection/Arthocentesis: L glenohumeral joint with capsular stretch  -     Large Joint Injection/Arthocentesis: L subacromial bursa        64 year old female presents with 3 months of left shoulder pain and stiffness without inciting injury or event.  On exam today, she is exquisitely tender to the proximal biceps tendon and has positive speeds test and symptoms consistent with biceps snapping as well as pain with impingement testing and range of motion restrictions consistent with adhesive capsulitis.  Discussed that she likely started with a biceps tendinitis/subacromial bursitis and has developed some adhesive capsulitis due to lack of movement.  She has been improving with PT, and I highly recommend that she continue this, however her significant pain and range of motion restrictions are likely limiting her therapeutic goals so I did recommend intra-articular injection with capsular stretch as well as a subacromial bursa injection to help with pain and inflammation and allow her to better participate in rehab.  Of note, significant peritendinous edema of the proximal biceps tendon was noted on ultrasound today, and patient did have significant improvement in both pain and range of motion  post-procedure.    Plan:  -Continue PT and home exercise program. Plan to go back to PT in 2 weeks after injections take full effect  -Left glenohumeral joint injection with capsular stretch and left subacromial bursa injection in clinic today  -If no improvement after therapy, consider MRI   -Follow-up in 6 weeks    Large Joint Injection/Arthocentesis: L glenohumeral joint with capsular stretch    Date/Time: 7/25/2024 1:32 PM    Performed by: Sissy Wills DO  Authorized by: Sissy Wills DO    Indications:  Pain and osteoarthritis  Needle Size:  22 G  Guidance: ultrasound    Approach:  Posterior  Location:  Shoulder      Site:  L glenohumeral joint  Medications:  40 mg triamcinolone 40 MG/ML; 3 mL lidocaine 1 %; 14 mL ROPivacaine 5 MG/ML  Outcome:  Tolerated well, no immediate complications  Procedure discussed: discussed risks, benefits, and alternatives    Consent Given by:  Patient  Timeout: timeout called immediately prior to procedure    Prep: patient was prepped and draped in usual sterile fashion     Ultrasound was used to ensure safe and accurate needle placement and injection. Ultrasound images of the procedure were permanently stored. 1ml of 8.4% Sodium Bicarbonate solution was used to buffer the local numbing agent for today's injection. 10 mL of ropivacaine and 10 mL of Bacteriostatic 0.9% Sodium Chloride (NDC: 2108-1570-22 Lot: [id number] Exp: 04/01/25) was used to distend the joint capsule.        Large Joint Injection/Arthocentesis: L subacromial bursa    Date/Time: 7/25/2024 1:34 PM    Performed by: Sissy Wills DO  Authorized by: Sissy Wills DO    Indications:  Pain  Needle Size:  25 G  Guidance: ultrasound    Approach:  Posterior  Location:  Shoulder      Site:  L subacromial bursa  Medications:  40 mg triamcinolone 40 MG/ML; 1 mL lidocaine 1 %  Outcome:  Tolerated well, no immediate complications  Procedure discussed: discussed risks, benefits, and alternatives  "   Consent Given by:  Patient  Timeout: timeout called immediately prior to procedure    Prep: patient was prepped and draped in usual sterile fashion     Ultrasound was used to ensure safe and accurate needle placement and injection. Ultrasound images of the procedure were permanently stored.          Dr. Sissy Wills DO, CAQ  [facility]  Sports Medicine     -----  Chief Complaint   Patient presents with     Left Shoulder - Pain       SUBJECTIVE  Alfonzo Cho is a/an 64 year old right-handed female who is seen in consultation at the request of  Alba Iraheta C.N.P. for evaluation of left shoulder pain.  Onset was 3 months ago with no injury. Pain is located over the anterior shoulder going down the biceps. Symptoms are worsened by sleeping in wrong positions and reaching behind.  She has tried physical therapy (3 sessions) with some improvements and ibuprofen. Associated symptoms include limited range of motion.    The patient is seen alone    Prior injury/Surgical history of affected joint: None  Social History/Occupation: Retired    REVIEW OF SYSTEMS:  Pertinent positives/negative: As stated above in HPI    OBJECTIVE:  /68   Ht 1.791 m (5' 10.5\")   Wt 84.8 kg (187 lb)   LMP 05/01/2009   BMI 26.45 kg/m     General: Alert and in no distress  CV: Extremities appear well perfused   Resp: normal respiratory effort  MSK:  Left Shoulder Exam:  Appearance: Symmetric shoulder heights, No scapular winging observed  Palpation: Tender to palpation of biceps tendon, trap  Rotator cuff strength:    Supraspinatus: 4+/5   ER: 4/5   IR: 5/5  AROM:    Forward flexion: 120   Abduction: 90   ER at side: 60   IR: To belt line  Special tests: + for Empty can, Neer, and Speeds       RADIOLOGY:  Final results and radiologist's interpretation available in the Psychiatric health record.  Images below were personally reviewed and discussed with the patient in the office today.  My personal " interpretation of the performed imaging: X-ray of the left shoulder reveals mild AC joint degenerative changes, normal alignment, no significant glenohumeral joint changes, no evidence of fractures                 Disclaimer: This note consists of text and symbols derived from dictation and/or voice recognition software. As a result, there may be errors in the script that have gone undetected. Please consider this when interpreting information found in this chart.        Again, thank you for allowing me to participate in the care of your patient.        Sincerely,        Sissy Wills, DO

## 2024-07-25 NOTE — PROGRESS NOTES
ASSESSMENT & PLAN    Alfonzo was seen today for pain.    Diagnoses and all orders for this visit:    Impingement syndrome of left shoulder  -     Sports Med Adult Follow-Up Clinic Order (As Needed); Future    Adhesive capsulitis of left shoulder  -     Sports Med Adult Follow-Up Clinic Order (As Needed); Future    Biceps tendinitis of left upper extremity  -     Sports Med Adult Follow-Up Clinic Order (As Needed); Future    Other orders  -     Large Joint Injection/Arthocentesis: L glenohumeral joint with capsular stretch  -     Large Joint Injection/Arthocentesis: L subacromial bursa        64 year old female presents with 3 months of left shoulder pain and stiffness without inciting injury or event.  On exam today, she is exquisitely tender to the proximal biceps tendon and has positive speeds test and symptoms consistent with biceps snapping as well as pain with impingement testing and range of motion restrictions consistent with adhesive capsulitis.  Discussed that she likely started with a biceps tendinitis/subacromial bursitis and has developed some adhesive capsulitis due to lack of movement.  She has been improving with PT, and I highly recommend that she continue this, however her significant pain and range of motion restrictions are likely limiting her therapeutic goals so I did recommend intra-articular injection with capsular stretch as well as a subacromial bursa injection to help with pain and inflammation and allow her to better participate in rehab.  Of note, significant peritendinous edema of the proximal biceps tendon was noted on ultrasound today, and patient did have significant improvement in both pain and range of motion post-procedure.    Plan:  -Continue PT and home exercise program. Plan to go back to PT in 2 weeks after injections take full effect  -Left glenohumeral joint injection with capsular stretch and left subacromial bursa injection in clinic today  -If no improvement after therapy,  consider MRI   -Follow-up in 6 weeks    Large Joint Injection/Arthocentesis: L glenohumeral joint with capsular stretch    Date/Time: 7/25/2024 1:32 PM    Performed by: Sissy Wills DO  Authorized by: Sissy Wills DO    Indications:  Pain and osteoarthritis  Needle Size:  22 G  Guidance: ultrasound    Approach:  Posterior  Location:  Shoulder      Site:  L glenohumeral joint  Medications:  40 mg triamcinolone 40 MG/ML; 3 mL lidocaine 1 %; 14 mL ROPivacaine 5 MG/ML  Outcome:  Tolerated well, no immediate complications  Procedure discussed: discussed risks, benefits, and alternatives    Consent Given by:  Patient  Timeout: timeout called immediately prior to procedure    Prep: patient was prepped and draped in usual sterile fashion     Ultrasound was used to ensure safe and accurate needle placement and injection. Ultrasound images of the procedure were permanently stored. 1ml of 8.4% Sodium Bicarbonate solution was used to buffer the local numbing agent for today's injection. 10 mL of ropivacaine and 10 mL of Bacteriostatic 0.9% Sodium Chloride (NDC: 8178-6770-22 Lot: [id number] Exp: 04/01/25) was used to distend the joint capsule.        Large Joint Injection/Arthocentesis: L subacromial bursa    Date/Time: 7/25/2024 1:34 PM    Performed by: Sissy Wills DO  Authorized by: Sissy Wills DO    Indications:  Pain  Needle Size:  25 G  Guidance: ultrasound    Approach:  Posterior  Location:  Shoulder      Site:  L subacromial bursa  Medications:  40 mg triamcinolone 40 MG/ML; 1 mL lidocaine 1 %  Outcome:  Tolerated well, no immediate complications  Procedure discussed: discussed risks, benefits, and alternatives    Consent Given by:  Patient  Timeout: timeout called immediately prior to procedure    Prep: patient was prepped and draped in usual sterile fashion     Ultrasound was used to ensure safe and accurate needle placement and injection. Ultrasound images of the procedure were  "permanently stored.          Dr. Sissy Wills DO, CAQ  [facility]  Sports Medicine     -----  Chief Complaint   Patient presents with    Left Shoulder - Pain       SUBJECTIVE  Alfonzo Cho is a/an 64 year old right-handed female who is seen in consultation at the request of  Alba Iraheta C.N.P. for evaluation of left shoulder pain.  Onset was 3 months ago with no injury. Pain is located over the anterior shoulder going down the biceps. Symptoms are worsened by sleeping in wrong positions and reaching behind.  She has tried physical therapy (3 sessions) with some improvements and ibuprofen. Associated symptoms include limited range of motion.    The patient is seen alone    Prior injury/Surgical history of affected joint: None  Social History/Occupation: Retired    REVIEW OF SYSTEMS:  Pertinent positives/negative: As stated above in HPI    OBJECTIVE:  /68   Ht 1.791 m (5' 10.5\")   Wt 84.8 kg (187 lb)   LMP 05/01/2009   BMI 26.45 kg/m     General: Alert and in no distress  CV: Extremities appear well perfused   Resp: normal respiratory effort  MSK:  Left Shoulder Exam:  Appearance: Symmetric shoulder heights, No scapular winging observed  Palpation: Tender to palpation of biceps tendon, trap  Rotator cuff strength:    Supraspinatus: 4+/5   ER: 4/5   IR: 5/5  AROM:    Forward flexion: 120   Abduction: 90   ER at side: 60   IR: To belt line  Special tests: + for Empty can, Neer, and Speeds       RADIOLOGY:  Final results and radiologist's interpretation available in the [facility] health record.  Images below were personally reviewed and discussed with the patient in the office today.  My personal interpretation of the performed imaging: X-ray of the left shoulder reveals mild AC joint degenerative changes, normal alignment, no significant glenohumeral joint changes, no evidence of fractures                 Disclaimer: This note consists of text and symbols derived from " dictation and/or voice recognition software. As a result, there may be errors in the script that have gone undetected. Please consider this when interpreting information found in this chart.

## 2024-07-25 NOTE — PATIENT INSTRUCTIONS
1. Impingement syndrome of left shoulder    2. Adhesive capsulitis of left shoulder    3. Biceps tendinitis of left upper extremity        Plan:  -Continue PT and home exercise program. Go back to PT in 2 weeks   -Left glenohumeral joint injection with capsular stretch and left subacromial bursa injection in clinic today  -If no improvement after therapy, consider MRI     If you have any questions or concerns after your appointment, please send my team a [employer] message or call the clinic at [phone]   Thank you for choosing [facility]!    Dr. Sissy Wills, DO, [facility]  Sports Medicine and Orthopedics    Dr. Wills's Clinic Locations:   [place] APPOINTMENTS: [phone]      [address] [facility] RADIOLOGY: [phone]   [place] [address] PHYSICAL THERAPY: [phone]    HAND THERAPY: [phone]   [place] BILLING QUESTIONS: [phone] [address] FAX: [phone]   [place] [address]       Frozen Shoulder: Exercises  Here are some examples of exercises for you to try. The exercises may be suggested for a condition or for rehabilitation. Start each exercise slowly. Ease off the exercises if you start to have pain. You will be told when to start these exercises and which ones will work best for you.    How to do the exercises    Pendulum swing    Hold on to a table or the back of a chair with your unaffected arm. Then bend forward a little and let your affected arm hang straight down. This exercise does not use the arm muscles. Rather, use your legs and your hips to create movement that makes your arm swing freely.  Use the movement from your hips and legs to guide the slightly swinging arm forward and backward like a pendulum (or elephant trunk). Then guide it in circles that start small (about the size of a dinner plate). Make the circles a bit larger each day, as your pain allows.  Do this exercise at least 10 times in each direction. Do it at least  3 times a day.  As you have less pain, try bending over a little farther or adding a light weight to this exercise. This will increase the amount of movement at your shoulder.    Wall climb (to the side)    Stand with your affected shoulder toward the wall, about an arm's length away.  With your affected arm slightly in front of you (about 30 degrees), walk your fingers up the wall as high as pain permits. Keep your shoulder relaxed and down, not shrugged. Keep your body straight, and don't lean to the side.  Hold that position for at least a few seconds.  Slowly walk your fingers back down to the starting position.  Repeat at least 2 to 4 times. Try to reach higher each time.  It's a good idea to repeat these steps with your other arm.    Wall climb (to the front)    Face a wall, and stand so your fingers can just touch it.  Keeping your shoulder down (don't shrug), walk the fingers of your affected arm up the wall as high as pain permits. Keep your back straight. Don't arch your back.  Hold that position for at least a few seconds.  Slowly walk your fingers back down.  Repeat at least 2 to 4 times. Try to reach higher each time. When you are able to reach higher, you can take a step toward the wall as you walk your fingers up, then step back as you walk your fingers back down.  It's a good idea to repeat these steps with your other arm.    Shoulder rotation (lying down, with wand)    Lie on your back, or stand for this excercise. Hold a wand with both hands with your elbows bent and palms up. You can also use a broom handle or anything stiff and about 3 feet long.  Hold your elbows close to your body, and move the wand across your body toward the affected arm.  Hold for 15 to 30 seconds, and then return to the starting position.  Repeat 2 to 4 times.  It's a good idea to repeat these steps toward your other arm.    Arm raise to the side    Start with your affected arm at your side, with your palm facing  forward.  Slowly raise your affected arm to the side.  Hold the position for 1 to 2 seconds. Then slowly lower your arm back to your side. If you need to, bring your other arm across your body and place it under the elbow as you lower your affected arm. Use that other arm to keep your affected arm from dropping down too fast.  Repeat 8 to 12 times.  It's a good idea to repeat these steps with your other arm.  Keep your arm a little in front of your body (about 30 degrees), not straight out to the side.  As you move, keep your shoulder relaxed (don't shrug) and your thumb up.  Stop before your arm is at shoulder height.  When you first start out, don't hold any extra weight in your hand. As you get stronger, you may use a 1- to 2-pound weight, a can of food, or a filled water bottle.    Up-the-back shoulder stretch    Your doctor or physical therapist may advise you to wait to do this stretch until you have regained most of your range of motion and strength.  Stand or sit up straight. If you're standing, keep your feet about hip-width apart.  Hold any of these stretches for at least 15 to 30 seconds.  Repeat 2 to 4 times.  If you can, repeat these steps for your other shoulder.  Light stretch: Put the hand of your affected arm in your back pocket (palm outward). Let it rest there to stretch your shoulder.  Moderate stretch: With your other hand, hold your affected arm (palm outward) behind your back by the wrist. Pull your arm up gently to stretch your shoulder.  Advanced stretch: Put a towel over your other shoulder. Put the hand of your affected arm behind your back. Now hold the back end of the towel. With the other hand, hold the front end of the towel in front of your body. Pull gently on the front end of the towel. This will bring your hand farther up your back to stretch your shoulder.    Follow-up care is a key part of your treatment and safety. Be sure to make and go to all appointments, and call your doctor  if you are having problems. It's also a good idea to know your test results and keep a list of the medicines you take.  Current as of: 2024  Content Version: 14.0    9328-2719 [employer].   Care instructions adapted under license by your healthcare professional. If you have questions about a medical condition or this instruction, always ask your healthcare professional. [employer] disclaims any warranty or liability for your use of this information.      Post-Injection Discharge Instructions    You may shower, however avoid swimming, tub baths or hot tubs for 24 hours following your procedure  You may have a mild to moderate increase in pain for a few days following the injection.  The lidocaine (local numbing medicine) will wear off in several hours. It usually takes 3-5 days for the steroid medication to start working although it may take up to 14 days for full effect.   You may use ice packs for 10-15 minutes, 3 to 4 times a day at the injection site for comfort if needed  You may use extra strength Tylenol for pain control if necessary   If you were fasting, you may resume your normal diet and medications after the procedure  If you have diabetes, your blood sugar may be higher than normal for 10-14 days following a steroid injection. Contact your doctor who manages your diabetes if your blood sugar is significantly higher than usual    If you experience any of the following, call Sports Medicine @ [phone] or [phone]  -Fever over 100 degrees F  -Swelling, bleeding, redness, drainage, warmth at the injection site  -New or significant worsening pain

## 2024-07-30 ENCOUNTER — HOSPITAL ENCOUNTER (OUTPATIENT)
Dept: MAMMOGRAPHY | Facility: CLINIC | Age: 64
Discharge: HOME | End: 2024-07-30
Attending: NURSE PRACTITIONER | Admitting: NURSE PRACTITIONER
Payer: MEDICARE

## 2024-07-30 DIAGNOSIS — Z12.31 VISIT FOR SCREENING MAMMOGRAM: ICD-10-CM

## 2024-07-30 PROCEDURE — 77063 BREAST TOMOSYNTHESIS BI: CPT

## 2024-08-07 ENCOUNTER — OFFICE VISIT (OUTPATIENT)
Dept: FAMILY MEDICINE | Facility: CLINIC | Age: 64
End: 2024-08-07
Payer: MEDICARE

## 2024-08-07 VITALS
SYSTOLIC BLOOD PRESSURE: 100 MMHG | WEIGHT: 187 LBS | BODY MASS INDEX: 26.18 KG/M2 | HEART RATE: 86 BPM | HEIGHT: 71 IN | OXYGEN SATURATION: 100 % | RESPIRATION RATE: 18 BRPM | TEMPERATURE: 97.5 F | DIASTOLIC BLOOD PRESSURE: 70 MMHG

## 2024-08-07 DIAGNOSIS — E55.9 VITAMIN D DEFICIENCY, UNSPECIFIED: ICD-10-CM

## 2024-08-07 DIAGNOSIS — Z13.21 ENCOUNTER FOR VITAMIN DEFICIENCY SCREENING: ICD-10-CM

## 2024-08-07 DIAGNOSIS — Z00.00 ENCOUNTER FOR MEDICARE ANNUAL WELLNESS EXAM: Primary | ICD-10-CM

## 2024-08-07 DIAGNOSIS — Z12.11 SCREEN FOR COLON CANCER: ICD-10-CM

## 2024-08-07 DIAGNOSIS — Z13.220 SCREENING FOR LIPID DISORDERS: ICD-10-CM

## 2024-08-07 DIAGNOSIS — Z78.0 ASYMPTOMATIC MENOPAUSE: ICD-10-CM

## 2024-08-07 DIAGNOSIS — Z13.1 SCREENING FOR DIABETES MELLITUS: ICD-10-CM

## 2024-08-07 DIAGNOSIS — Z13.0 SCREENING FOR DEFICIENCY ANEMIA: ICD-10-CM

## 2024-08-07 DIAGNOSIS — I70.0 AORTIC CALCIFICATION (H): ICD-10-CM

## 2024-08-07 LAB
ALBUMIN SERPL BCG-MCNC: 4.5 G/DL (ref 3.5–5.2)
ALP SERPL-CCNC: 65 U/L (ref 40–150)
ALT SERPL W P-5'-P-CCNC: 25 U/L (ref 0–50)
ANION GAP SERPL CALCULATED.3IONS-SCNC: 9 MMOL/L (ref 7–15)
AST SERPL W P-5'-P-CCNC: 23 U/L (ref 0–45)
BILIRUB SERPL-MCNC: 0.6 MG/DL
BUN SERPL-MCNC: 13.4 MG/DL (ref 8–23)
CALCIUM SERPL-MCNC: 9.6 MG/DL (ref 8.8–10.4)
CHLORIDE SERPL-SCNC: 104 MMOL/L (ref 98–107)
CHOLEST SERPL-MCNC: 240 MG/DL
CREAT SERPL-MCNC: 0.84 MG/DL (ref 0.51–0.95)
EGFRCR SERPLBLD CKD-EPI 2021: 76 ML/MIN/1.73M2
ERYTHROCYTE [DISTWIDTH] IN BLOOD BY AUTOMATED COUNT: 13 % (ref 10–15)
FASTING STATUS PATIENT QL REPORTED: YES
FASTING STATUS PATIENT QL REPORTED: YES
GLUCOSE SERPL-MCNC: 88 MG/DL (ref 70–99)
HCO3 SERPL-SCNC: 27 MMOL/L (ref 22–29)
HCT VFR BLD AUTO: 41.5 % (ref 35–47)
HDLC SERPL-MCNC: 91 MG/DL
HGB BLD-MCNC: 13.7 G/DL (ref 11.7–15.7)
LDLC SERPL CALC-MCNC: 135 MG/DL
MCH RBC QN AUTO: 31.5 PG (ref 26.5–33)
MCHC RBC AUTO-ENTMCNC: 33 G/DL (ref 31.5–36.5)
MCV RBC AUTO: 95 FL (ref 78–100)
NONHDLC SERPL-MCNC: 149 MG/DL
PLATELET # BLD AUTO: 237 10E3/UL (ref 150–450)
POTASSIUM SERPL-SCNC: 4.1 MMOL/L (ref 3.4–5.3)
PROT SERPL-MCNC: 6.9 G/DL (ref 6.4–8.3)
RBC # BLD AUTO: 4.35 10E6/UL (ref 3.8–5.2)
SODIUM SERPL-SCNC: 140 MMOL/L (ref 135–145)
TRIGL SERPL-MCNC: 72 MG/DL
VIT D+METAB SERPL-MCNC: 28 NG/ML (ref 20–50)
WBC # BLD AUTO: 5.2 10E3/UL (ref 4–11)

## 2024-08-07 PROCEDURE — 80053 COMPREHEN METABOLIC PANEL: CPT | Performed by: NURSE PRACTITIONER

## 2024-08-07 PROCEDURE — 36415 COLL VENOUS BLD VENIPUNCTURE: CPT | Performed by: NURSE PRACTITIONER

## 2024-08-07 PROCEDURE — 85027 COMPLETE CBC AUTOMATED: CPT | Mod: GZ | Performed by: NURSE PRACTITIONER

## 2024-08-07 PROCEDURE — 82306 VITAMIN D 25 HYDROXY: CPT | Performed by: NURSE PRACTITIONER

## 2024-08-07 PROCEDURE — 80061 LIPID PANEL: CPT | Performed by: NURSE PRACTITIONER

## 2024-08-07 PROCEDURE — 82274 ASSAY TEST FOR BLOOD FECAL: CPT | Performed by: NURSE PRACTITIONER

## 2024-08-07 PROCEDURE — G0438 PPPS, INITIAL VISIT: HCPCS | Performed by: NURSE PRACTITIONER

## 2024-08-07 NOTE — PROGRESS NOTES
"Preventive Care Visit  [facility]  LORENZO Dooley CNP, Family Medicine  Aug 7, 2024      Assessment & Plan     Alfonzo was seen today for physical.    Diagnoses and all orders for this visit:    Encounter for Medicare annual wellness exam  -     REVIEW OF HEALTH MAINTENANCE PROTOCOL ORDERS  -     PRIMARY CARE FOLLOW-UP SCHEDULING; Future    Screen for colon cancer  -     Fecal colorectal cancer screen FIT - Future (S+30)    Screening for deficiency anemia  -     CBC with platelets    Screening for lipid disorders  -     Lipid panel reflex to direct LDL Non-fasting    Screening for diabetes mellitus  -     Comprehensive metabolic panel (BMP + Alb, Alk Phos, ALT, AST, Total. Bili, TP)    Encounter for vitamin deficiency screening  -     Vitamin D Deficiency  Vitamin D deficiency, unspecified  Noted history, plan recheck of levels.    -     Vitamin D Deficiency    Asymptomatic menopause  -     DEXA HIP/PELVIS/SPINE - Future; Future    Aortic calcification (H24)  Previously noted on chest x-ray, consultation with cardiology - Dr. Resendiz on 5/5/2021, no recommendations for long term statin therapy at that time.            BMI  Estimated body mass index is 26.45 kg/m  as calculated from the following:    Height as of this encounter: 1.791 m (5' 10.5\").    Weight as of this encounter: 84.8 kg (187 lb).       Counseling  Appropriate preventive services were addressed with this patient.  Checklist reviewing preventive services available has been given to the patient.    Return in about 1 year (around 8/7/2025) for Medicare Wellness Visit .      Subjective   Alfonzo is a 64 year old, presenting for the following:  Physical        8/7/2024     9:42 AM   Additional Questions   Roomed by Ainsley       Health Care Directive  Patient has a Health Care Directive on file  Advance care planning document is on file and is current.    HPI      Social:   for 46 years, three children, three grandchildren " (live in [place]).        Recent Labs   Lab Test 11/01/22  1129 03/30/21  1036   CHOL 218* 207*   HDL 82 81   * 113*   TRIG 73 67   The 10-year ASCVD risk score (Reed TEMPLETON, et al., 2019) is: 3.5%    Values used to calculate the score:      Age: 64 years      Sex: Female      Is Non- : No      Diabetic: No      Tobacco smoker: No      Systolic Blood Pressure: 100 mmHg      Is BP treated: No      HDL Cholesterol: 82 mg/dL      Total Cholesterol: 218 mg/dL        8/7/2024   General Health   How would you rate your overall physical health? Good   Feel stress (tense, anxious, or unable to sleep) Only a little      (!) STRESS CONCERN      8/7/2024   Nutrition   Diet: Regular (no restrictions)            4/7/2021   Exercise   Frequency of exercise: None            8/7/2024   Social Factors   Worry food won't last until get money to buy more Patient declined   Food not last or not have enough money for food? Patient declined   Do you have housing? (Housing is defined as stable permanent housing and does not include staying ouside in a car, in a tent, in an abandoned building, in an overnight shelter, or couch-surfing.) Patient declined   Are you worried about losing your housing? Patient declined   Lack of transportation? Patient declined   Unable to get utilities (heat,electricity)? Patient declined            8/7/2024   Fall Risk   Fallen 2 or more times in the past year? No   Trouble with walking or balance? No             8/7/2024   Activities of Daily Living- Home Safety   Needs help with the following daily activites None of the above   Safety concerns in the home None of the above            8/7/2024   Dental   Dentist two times every year? Yes            8/7/2024   Hearing Screening   Hearing concerns? None of the above            8/7/2024   Driving Risk Screening   Patient/family members have concerns about driving (!) DECLINE            8/7/2024   General Alertness/Fatigue Screening    Have you been more tired than usual lately? (!) YES            2024   Urinary Incontinence Screening   Bothered by leaking urine in past 6 months No            2024   TB Screening   Were you born outside of the [place]? Decline            Today's PHQ-2 Score:       2024     9:34 AM   PHQ-2 (  [employer])   Q1: Little interest or pleasure in doing things 0   Q2: Feeling down, depressed or hopeless 0   PHQ-2 Score 0   Q1: Little interest or pleasure in doing things Not at all   Q2: Feeling down, depressed or hopeless Not at all   PHQ-2 Score 0           2024   Substance Use   Alcohol more than 3/day or more than 7/wk No   Do you have a current opioid prescription? No   How severe/bad is pain from 1 to 10? 0/10 (No Pain)   Do you use any other substances recreationally? No        Social History     Tobacco Use    Smoking status: Former     Current packs/day: 0.00     Types: Cigarettes     Start date: 1978     Quit date: 1980     Years since quittin.6    Smokeless tobacco: Never    Tobacco comments:     only smoked a couple of years   Substance Use Topics    Alcohol use: Yes     Comment: 2-4 drinks/month    Drug use: No     Comment: no herbal meds either          2024   LAST FHS-7 RESULTS   1st degree relative breast or ovarian cancer No   Any relative bilateral breast cancer No   Any male have breast cancer No   Any ONE woman have BOTH breast AND ovarian cancer No   Any woman with breast cancer before 50yrs No   2 or more relatives with breast AND/OR ovarian cancer No   2 or more relatives with breast AND/OR bowel cancer No          Mammogram Screening - Mammogram every 1-2 years updated in Health Maintenance based on mutual decision making      History of abnormal Pap smear: No - age 65 or older with adequate negative prior screening test results (3 consecutive negative cytology results, 2 consecutive negative cotesting results, or 2 consecutive negative HrHPV test results within 10  years, with the most recent test occurring within the recommended screening interval for the test used)        Latest Ref Rng & Units 10/22/2019     9:30 AM 10/22/2019     9:20 AM 2016    10:38 AM   PAP / HPV   PAP (Historical)  NIL      HPV 16 DNA NEG^Negative  Negative  Negative    HPV 18 DNA NEG^Negative  Negative  Negative    Other HR HPV NEG^Negative  Negative  Negative      ASCVD Risk   The 10-year ASCVD risk score (Reed TEMPLETON, et al., 2019) is: 3.5%    Values used to calculate the score:      Age: 64 years      Sex: Female      Is Non- : No      Diabetic: No      Tobacco smoker: No      Systolic Blood Pressure: 100 mmHg      Is BP treated: No      HDL Cholesterol: 82 mg/dL      Total Cholesterol: 218 mg/dL      Reviewed and updated as needed this visit by Provider       Med Hx  Surg Hx    Sexual Activity          BP Readings from Last 3 Encounters:   24 100/70   24 100/68   24 104/60    Wt Readings from Last 3 Encounters:   24 84.8 kg (187 lb)   24 84.8 kg (187 lb)   24 84.8 kg (187 lb)                  Patient Active Problem List   Diagnosis    Seasonal allergic rhinitis    Symptomatic menopausal or female climacteric states    Sleep apnea    history of Thrombophlebitis leg (H)    Plantar fasciitis    Seborrheic dermatitis    Infrapatellar bursitis    Low blood pressure- usual - asymptomatic- 90/60 = baseline    Family history of hypothyroidism- father    Memory difficulties- some short-term, worse at work    Aortic calcification (H24)    Nonrheumatic aortic valve insufficiency - mild - no symptoms.     BMI 28.0-28.9,adult    Asymptomatic varicose veins    Gastroesophageal reflux disease, esophagitis presence not specified    Inadequate sleep hygiene    Acute pain of left shoulder     Past Surgical History:   Procedure Laterality Date     SECTION      S/P CSECT X 3    ENDOSCOPIC STRIPPING VEIN(S)  92,02    S/P VEIN STRIPPING      TUBAL LIGATION      with 3rd     [facility] COLONOSCOPY THRU STOMA, DIAGNOSTIC  2009     [place] gastro - normal - repeat in 2019        Social History     Tobacco Use    Smoking status: Former     Current packs/day: 0.00     Types: Cigarettes     Start date: 1978     Quit date: 1980     Years since quittin.6    Smokeless tobacco: Never    Tobacco comments:     only smoked a couple of years   Substance Use Topics    Alcohol use: Yes     Comment: 2-4 drinks/month     Family History   Problem Relation Age of Onset    Neurologic Disorder Father         ALS    Hypothyroidism Father     Glaucoma Father         glaucoma    Thyroid Disease Father     Osteoporosis Mother         on Fosamax     Uterine Cancer Mother 78        endometrial cancer s/p hysterectomy at age 80 - still alive     Esophageal Cancer Maternal Uncle     Esophageal Cancer Maternal Uncle     Hearing Loss Brother     No Known Problems Son     No Known Problems Daughter     No Known Problems Son     Hearing Loss Brother     Hearing Loss Brother     C.A.D. Maternal Grandfather          age 62    Diabetes Paternal Uncle     Cerebrovascular Disease Maternal Grandmother          age 70     No Known Problems Paternal Grandmother     No Known Problems Paternal Grandfather          Current Outpatient Medications   Medication Sig Dispense Refill    cholecalciferol (VITAMIN D3) 25 mcg (1000 units) capsule       metroNIDAZOLE (METROCREAM) 0.75 % external cream Apply topically 2 times daily 45 g 3    naproxen (NAPROSYN) 500 MG tablet Take 1 tablet (500 mg) by mouth 2 times daily (with meals) 45 tablet 0     Current providers sharing in care for this patient include:  Patient Care Team:  Alba Iraheta APRN CNP as PCP - General  Alba Iraheta APRN CNP as Assigned PCP  Carolyn Contreras PA-C as Physician Assistant (Dermatology)  Damaris Sultana APRN CNP as Nurse Practitioner (Dermatology)  Nicky Taylor PA-C as Assigned  "Surgical Provider    The following health maintenance items are reviewed in Epic and correct as of today:  Health Maintenance   Topic Date Due    DEXA  Never done    ZOSTER IMMUNIZATION (1 of 2) Never done    RSV VACCINE (Pregnancy & 60+) (1 - 1-dose 60+ series) Never done    Pneumococcal Vaccine: 65+ Years (1 of 1 - PCV) Never done    COVID-19 Vaccine (1 - 2023-24 season) Never done    LIPID  11/01/2023    COLORECTAL CANCER SCREENING  11/02/2023    INFLUENZA VACCINE (1) 09/01/2024    MAMMO SCREENING  07/30/2025    MEDICARE ANNUAL WELLNESS VISIT  08/07/2025    ANNUAL REVIEW OF HM ORDERS  08/07/2025    FALL RISK ASSESSMENT  08/07/2025    GLUCOSE  02/17/2026    ADVANCE CARE PLANNING  08/07/2029    DTAP/TDAP/TD IMMUNIZATION (3 - Td or Tdap) 10/05/2032    HEPATITIS C SCREENING  Completed    PHQ-2 (once per calendar year)  Completed    IPV IMMUNIZATION  Aged Out    HPV IMMUNIZATION  Aged Out    MENINGITIS IMMUNIZATION  Aged Out    RSV MONOCLONAL ANTIBODY  Aged Out    PAP  Discontinued         Review of Systems  Constitutional, HEENT, cardiovascular, pulmonary, gi and gu systems are negative, except as otherwise noted.     Objective    Exam  /70   Pulse 86   Temp 97.5  F (36.4  C) (Tympanic)   Resp 18   Ht 1.791 m (5' 10.5\")   Wt 84.8 kg (187 lb)   LMP 05/01/2009   SpO2 100%   BMI 26.45 kg/m     Estimated body mass index is 26.45 kg/m  as calculated from the following:    Height as of this encounter: 1.791 m (5' 10.5\").    Weight as of this encounter: 84.8 kg (187 lb).    Physical Exam    GENERAL: alert and no distress  EYES: Eyes grossly normal to inspection  HENT: ear canals and TM's normal, nose and mouth without ulcers or lesions  NECK: no adenopathy, no asymmetry, masses, or scars  RESP: lungs clear to auscultation - no rales, rhonchi or wheezes  CV: regular rate and rhythm, normal S1 S2, no S3 or S4, no murmur, click or rub, no peripheral edema  ABDOMEN: soft, nontender, no hepatosplenomegaly, no " masses and bowel sounds normal  MS: no gross musculoskeletal defects noted, no edema  SKIN: no suspicious lesions or rashes  NEURO: Normal strength and tone, mentation intact and speech normal  PSYCH: mentation appears normal, affect normal/bright        8/7/2024   Mini Cog   Clock Draw Score 2 Normal   3 Item Recall 3 objects recalled        VISION   Wears glasses: worn for testing  Tool used: lupillo   Right eye:        10/12.5 (20/25)  Left eye:          10/12.5 (20/25)  Visual Acuity: Pass         Signed Electronically by: LORENZO Dooley CNP

## 2024-08-07 NOTE — PATIENT INSTRUCTIONS
Patient Education   Preventive Care Advice   This is general advice given by our system to help you stay healthy. However, your care team may have specific advice just for you. Please talk to your care team about your preventive care needs.  Nutrition  Eat 5 or more servings of fruits and vegetables each day.  Try wheat bread, brown rice and whole grain pasta (instead of white bread, rice, and pasta).  Get enough calcium and vitamin D. Check the label on foods and aim for 100% of the RDA (recommended daily allowance).  Lifestyle  Exercise at least 150 minutes each week  (30 minutes a day, 5 days a week).  Do muscle strengthening activities 2 days a week. These help control your weight and prevent disease.  No smoking.  Wear sunscreen to prevent skin cancer.  Have a dental exam and cleaning every 6 months.  Yearly exams  See your health care team every year to talk about:  Any changes in your health.  Any medicines your care team has prescribed.  Preventive care, family planning, and ways to prevent chronic diseases.  Shots (vaccines)   HPV shots (up to age 26), if you've never had them before.  Hepatitis B shots (up to age 59), if you've never had them before.  COVID-19 shot: Get this shot when it's due.  Flu shot: Get a flu shot every year.  Tetanus shot: Get a tetanus shot every 10 years.  Pneumococcal, hepatitis A, and RSV shots: Ask your care team if you need these based on your risk.  Shingles shot (for age 50 and up)  General health tests  Diabetes screening:  Starting at age 35, Get screened for diabetes at least every 3 years.  If you are younger than age 35, ask your care team if you should be screened for diabetes.  Cholesterol test: At age 39, start having a cholesterol test every 5 years, or more often if advised.  Bone density scan (DEXA): At age 50, ask your care team if you should have this scan for osteoporosis (brittle bones).  Hepatitis C: Get tested at least once in your life.  STIs (sexually  transmitted infections)  Before age 24: Ask your care team if you should be screened for STIs.  After age 24: Get screened for STIs if you're at risk. You are at risk for STIs (including HIV) if:  You are sexually active with more than one person.  You don't use condoms every time.  You or a partner was diagnosed with a sexually transmitted infection.  If you are at risk for HIV, ask about PrEP medicine to prevent HIV.  Get tested for HIV at least once in your life, whether you are at risk for HIV or not.  Cancer screening tests  Cervical cancer screening: If you have a cervix, begin getting regular cervical cancer screening tests starting at age 21.  Breast cancer scan (mammogram): If you've ever had breasts, begin having regular mammograms starting at age 40. This is a scan to check for breast cancer.  Colon cancer screening: It is important to start screening for colon cancer at age 45.  Have a colonoscopy test every 10 years (or more often if you're at risk) Or, ask your provider about stool tests like a FIT test every year or Cologuard test every 3 years.  To learn more about your testing options, visit:   .  For help making a decision, visit:   https://bit.ly/nk23446.  Prostate cancer screening test: If you have a prostate, ask your care team if a prostate cancer screening test (PSA) at age 55 is right for you.  Lung cancer screening: If you are a current or former smoker ages 50 to 80, ask your care team if ongoing lung cancer screenings are right for you.  For informational purposes only. Not to replace the advice of your health care provider. Copyright   2023 [facility]. All rights reserved. Clinically reviewed by the [facility] Transitions Program. Personal [id number] - REV 01/24.  Learning About Sleeping Well  What does sleeping well mean?     Sleeping well means getting enough sleep to feel good and stay healthy. How much sleep is enough varies among people.  The number of hours you  sleep and how you feel when you wake up are both important. If you do not feel refreshed, you probably need more sleep. Another sign of not getting enough sleep is feeling tired during the day.  Experts recommend that adults get at least 7 or more hours of sleep per day. Children and older adults need more sleep.  Why is getting enough sleep important?  Getting enough quality sleep is a basic part of good health. When your sleep suffers, your physical health, mood, and your thoughts can suffer too. You may find yourself feeling more grumpy or stressed. Not getting enough sleep also can lead to serious problems, including injury, accidents, anxiety, and depression.  What might cause poor sleeping?  Many things can cause sleep problems, including:  Changes to your sleep schedule.  Stress. Stress can be caused by fear about a single event, such as giving a speech. Or you may have ongoing stress, such as worry about work or school.  Depression, anxiety, and other mental or emotional conditions.  Changes in your sleep habits or surroundings. This includes changes that happen where you sleep, such as noise, light, or sleeping in a different bed. It also includes changes in your sleep pattern, such as having jet lag or working a late shift.  Health problems, such as pain, breathing problems, and restless legs syndrome.  Lack of regular exercise.  Using alcohol, nicotine, or caffeine before bed.  How can you help yourself?  Here are some tips that may help you sleep more soundly and wake up feeling more refreshed.  Your sleeping area   Use your bedroom only for sleeping and sex. A bit of light reading may help you fall asleep. But if it doesn't, do your reading elsewhere in the house. Try not to use your TV, computer, smartphone, or tablet while you are in bed.  Be sure your bed is big enough to stretch out comfortably, especially if you have a sleep partner.  Keep your bedroom quiet, dark, and cool. Use curtains, blinds,  "or a sleep mask to block out light. To block out noise, use earplugs, soothing music, or a \"white noise\" machine.  Your evening and bedtime routine   Create a relaxing bedtime routine. You might want to take a warm shower or bath, or listen to soothing music.  Go to bed at the same time every night. And get up at the same time every morning, even if you feel tired.  What to avoid   Limit caffeine (coffee, tea, caffeinated sodas) during the day, and don't have any for at least 6 hours before bedtime.  Avoid drinking alcohol before bedtime. Alcohol can cause you to wake up more often during the night.  Try not to smoke or use tobacco, especially in the evening. Nicotine can keep you awake.  Limit naps during the day, especially close to bedtime.  Avoid lying in bed awake for too long. If you can't fall asleep or if you wake up in the middle of the night and can't get back to sleep within about 20 minutes, get out of bed and go to another room until you feel sleepy.  Avoid taking medicine right before bed that may keep you awake or make you feel hyper or energized. Your doctor can tell you if your medicine may do this and if you can take it earlier in the day.  If you can't sleep   Imagine yourself in a peaceful, pleasant scene. Focus on the details and feelings of being in a place that is relaxing.  Get up and do a quiet or boring activity until you feel sleepy.  Avoid drinking any liquids before going to bed to help prevent waking up often to use the bathroom.  Where can you learn more?  Go to https://www.aka[employer].net/patiented  Enter J942 in the search box to learn more about \"Learning About Sleeping Well.\"  Current as of: July 10, 2023  Content Version: 14.1 2006-2024 [employer].   Care instructions adapted under license by your healthcare professional. If you have questions about a medical condition or this instruction, always ask your healthcare professional. [employer] disclaims " any warranty or liability for your use of this information.

## 2024-08-07 NOTE — RESULT ENCOUNTER NOTE
Dear Alfonzo,     -Normal red blood cell (hgb) levels, normal white blood cell count and normal platelet levels.      Please send a [employer] message or call [phone]  if you have any questions.      LORENZO Dooley, [facility]    If you have further questions about the interpretation of your labs, [email or web address] is a good website to check out for further information.

## 2024-08-08 ENCOUNTER — THERAPY VISIT (OUTPATIENT)
Dept: PHYSICAL THERAPY | Facility: CLINIC | Age: 64
End: 2024-08-08
Payer: MEDICARE

## 2024-08-08 DIAGNOSIS — M25.512 ACUTE PAIN OF LEFT SHOULDER: Primary | ICD-10-CM

## 2024-08-08 PROCEDURE — 97110 THERAPEUTIC EXERCISES: CPT | Mod: GP | Performed by: PHYSICAL THERAPIST

## 2024-08-10 NOTE — RESULT ENCOUNTER NOTE
Dear Alfonzo,     -LDL(bad) cholesterol level is slightly elevated which can increase your heart disease risk.  Your HDL and triglycerides are normal.  ADVISE: exercising 150 minutes of aerobic exercise per week (30 minutes for 5 days per week or 50 minutes for 3 days per week are options) and eating a low saturated fat/low carbohydrate diet are helpful to improve this. In 12 months, you should recheck your fasting cholesterol panel.    -Liver and gallbladder tests are normal (ALT,AST, Alk phos, bilirubin), kidney function is normal (Cr, GFR), sodium is normal, potassium is normal, calcium is normal, glucose is normal.    -Vitamin D level is normal and getting 1000 IU daily in your diet or supplements is recommended.       Thank you,     LORENZO Dooley, CNP  [facility] [place]    If you have further questions about the interpretation of your labs, [email or web address] is a good website to check out for further information.

## 2024-08-12 LAB — HEMOCCULT STL QL IA: NEGATIVE

## 2024-09-04 NOTE — PROGRESS NOTES
"ASSESSMENT & PLAN    Alfonzo was seen today for follow up.    Diagnoses and all orders for this visit:    Adhesive capsulitis of left shoulder    Biceps tendinitis of left upper extremity      64-year-old female presents to follow-up on left shoulder pain and adhesive capsulitis after steroid injection with capsular stretch performed 7/25.  She reports her pain is almost completely resolved, has been participating in PT, and on exam today she has full range of motion of the left shoulder, only mild tenderness over the proximal biceps tendon, and good rotator cuff strength.    Plan:  -Continue home exercise program and PT  -Continue to stay active to help with arthritis  -Tylenol as needed for pain  -If needed, can return for injection at any time  -Follow-up as needed    Dr. Sissy Wills, [facility]  Sports Medicine     -----  Chief Complaint   Patient presents with    Left Shoulder - Follow Up       SUBJECTIVE  Alfonzo Cho is a/an 64 year old female who is seen to follow-up on left shoulder pain. The patient was last seen 7/25/24 at which she got a left glenohumeral injection with capsular stretch. Since last visit, she has been doing really well. She reports that prior to her last appointment, she was not able to do much with her left arm but now she is able to do virtually everything. She does feel some stiffness with repetitive overhead motions. She has been continuing PT and has been doing her home exercises.     The patient is seen alone        REVIEW OF SYSTEMS:  Pertinent positives/negative: As stated above in HPI    OBJECTIVE:  /64   Ht 1.778 m (5' 10\")   Wt 84.8 kg (187 lb)   LMP 05/01/2009   BMI 26.83 kg/m     General: Alert and in no distress  Skin: no visable rashes  CV: Extremities appear well perfused   Resp: normal respiratory effort, no conversational dyspnea   Psych: normal mood, affect  MSK:  Left Shoulder Exam:  Appearance: Symmetric shoulder heights, " No scapular winging observed  Palpation: Tender to palpation of biceps tendon  Rotator cuff strength:    Supraspinatus: 5/5   ER: 5/5   IR: 5/5  AROM:    Forward flexion: 170   Abduction: 180   ER at side: 90   IR: To belt line  Special tests: + for Neer       RADIOLOGY:  Final results and radiologist's interpretation available in the [facility] health record.  Images below were personally reviewed and discussed with the patient in the office today.  My personal interpretation of the performed imaging: No new imaging                 Disclaimer: This note consists of symbols derived from dictation and/or voice recognition software. As a result, there may be errors in the script that have gone undetected. Please consider this when interpreting information found in this chart.

## 2024-09-05 ENCOUNTER — OFFICE VISIT (OUTPATIENT)
Dept: ORTHOPEDICS | Facility: CLINIC | Age: 64
End: 2024-09-05
Payer: MEDICARE

## 2024-09-05 VITALS
WEIGHT: 187 LBS | BODY MASS INDEX: 26.77 KG/M2 | HEIGHT: 70 IN | SYSTOLIC BLOOD PRESSURE: 102 MMHG | DIASTOLIC BLOOD PRESSURE: 64 MMHG

## 2024-09-05 DIAGNOSIS — M75.02 ADHESIVE CAPSULITIS OF LEFT SHOULDER: Primary | ICD-10-CM

## 2024-09-05 DIAGNOSIS — M75.22 BICEPS TENDINITIS OF LEFT UPPER EXTREMITY: ICD-10-CM

## 2024-09-05 PROCEDURE — 99213 OFFICE O/P EST LOW 20 MIN: CPT | Performed by: STUDENT IN AN ORGANIZED HEALTH CARE EDUCATION/TRAINING PROGRAM

## 2024-09-05 NOTE — PATIENT INSTRUCTIONS
1. Adhesive capsulitis of left shoulder    2. Biceps tendinitis of left upper extremity        Plan:  -Continue home exercise program and PT  -Continue to stay active to help with arthritis  -Tylenol as needed for pain  -If needed, can return for injection at any time    If you have questions/concerns after your appointment, please send my team a [employer] message or call the clinic at [phone]. If you had imaging performed at your appointment today, you can expect to see your results in [employer] within approximately 48 business hours.     Dr. Sissy Wills DO, [facility]  Sports Medicine and Orthopedics    Dr. Wills's Clinic Locations and Contact Numbers:   [place] APPOINTMENTS: [phone]      [address] RADIOLOGY: [phone]   [place] [address] PHYSICAL THERAPY: [phone]    HAND THERAPY/OT: [phone]   [place] BILLING QUESTIONS: [phone] [address] FAX: [phone]   [place] [address]

## 2024-09-05 NOTE — LETTER
"9/5/2024      Alfonzo Cho  [address] [address]      Dear Colleague,    Thank you for referring your patient, Alfonzo Cho, to the [facility] [place]. Please see a copy of my visit note below.    ASSESSMENT & PLAN    Alfonzo was seen today for follow up.    Diagnoses and all orders for this visit:    Adhesive capsulitis of left shoulder    Biceps tendinitis of left upper extremity      64-year-old female presents to follow-up on left shoulder pain and adhesive capsulitis after steroid injection with capsular stretch performed 7/25.  She reports her pain is almost completely resolved, has been participating in PT, and on exam today she has full range of motion of the left shoulder, only mild tenderness over the proximal biceps tendon, and good rotator cuff strength.    Plan:  -Continue home exercise program and PT  -Continue to stay active to help with arthritis  -Tylenol as needed for pain  -If needed, can return for injection at any time  -Follow-up as needed    Dr. Sissy Wills, DO  [facility]  Sports Medicine     -----  Chief Complaint   Patient presents with     Left Shoulder - Follow Up       SUBJECTIVE  Alfonzo Cho is a/an 64 year old female who is seen to follow-up on left shoulder pain. The patient was last seen 7/25/24 at which she got a left glenohumeral injection with capsular stretch. Since last visit, she has been doing really well. She reports that prior to her last appointment, she was not able to do much with her left arm but now she is able to do virtually everything. She does feel some stiffness with repetitive overhead motions. She has been continuing PT and has been doing her home exercises.     The patient is seen alone        REVIEW OF SYSTEMS:  Pertinent positives/negative: As stated above in HPI    OBJECTIVE:  /64   Ht 1.778 m (5' 10\")   Wt 84.8 kg (187 lb)   LMP 05/01/2009   BMI 26.83 " kg/m     General: Alert and in no distress  Skin: no visable rashes  CV: Extremities appear well perfused   Resp: normal respiratory effort, no conversational dyspnea   Psych: normal mood, affect  MSK:  Left Shoulder Exam:  Appearance: Symmetric shoulder heights, No scapular winging observed  Palpation: Tender to palpation of biceps tendon  Rotator cuff strength:    Supraspinatus: 5/5   ER: 5/5   IR: 5/5  AROM:    Forward flexion: 170   Abduction: 180   ER at side: 90   IR: To belt line  Special tests: + for Neer       RADIOLOGY:  Final results and radiologist's interpretation available in the [facility] health record.  Images below were personally reviewed and discussed with the patient in the office today.  My personal interpretation of the performed imaging: No new imaging                 Disclaimer: This note consists of symbols derived from dictation and/or voice recognition software. As a result, there may be errors in the script that have gone undetected. Please consider this when interpreting information found in this chart.        Again, thank you for allowing me to participate in the care of your patient.        Sincerely,        Sissy Wills, DO

## 2024-09-11 ENCOUNTER — ANCILLARY PROCEDURE (OUTPATIENT)
Dept: BONE DENSITY | Facility: CLINIC | Age: 64
End: 2024-09-11
Attending: NURSE PRACTITIONER
Payer: MEDICARE

## 2024-09-11 DIAGNOSIS — Z78.0 ASYMPTOMATIC MENOPAUSE: ICD-10-CM

## 2024-09-11 PROCEDURE — 77080 DXA BONE DENSITY AXIAL: CPT | Mod: TC | Performed by: PHYSICIAN ASSISTANT

## 2024-09-15 PROBLEM — M85.89 OSTEOPENIA OF MULTIPLE SITES: Status: ACTIVE | Noted: 2024-09-15

## 2024-09-16 NOTE — RESULT ENCOUNTER NOTE
Dear Alfonzo,     Your bone density study showed osteopenia.  This is mild loss of bone strength but not to the level of osteoporosis.  I am recommending that you ensure adequate calcium and vitamin D intake as well as exercise.  This amounts to 1200 mg of calcium and 800 international units of vitamin D daily.  I recommend that we recheck your DEXA scan in a couple years and consider additional medication if osteoporosis (a worsening of osteopenia) is demonstrated.  Please make an appointment to see me if you have other questions or concerns.        Thank you,     Alba Iraheta, LORENZO, CNP  [facility]

## 2024-10-01 ENCOUNTER — THERAPY VISIT (OUTPATIENT)
Dept: PHYSICAL THERAPY | Facility: CLINIC | Age: 64
End: 2024-10-01
Payer: MEDICARE

## 2024-10-01 DIAGNOSIS — M25.512 ACUTE PAIN OF LEFT SHOULDER: Primary | ICD-10-CM

## 2024-10-01 PROCEDURE — 97110 THERAPEUTIC EXERCISES: CPT | Mod: GP | Performed by: PHYSICAL THERAPIST

## 2024-10-01 NOTE — PROGRESS NOTES
All goals met and is near 100%, discharge to [facility] at this time.     [facility]                                                                                   OUTPATIENT PHYSICAL THERAPY    PLAN OF TREATMENT FOR OUTPATIENT REHABILITATION   Patient's Last Name, First Name, Alfonzo Linder YOB: 1960   Provider's Name   [facility]   Medical Record No.  [id number]     Onset Date: 06/05/24  Start of Care Date: 06/05/24     Medical Diagnosis:  Acute pain of left shoulder      PT Treatment Diagnosis:  Acute pain of left shoulder Plan of Treatment  Frequency/Duration: 1 visit/ 1 visit    Certification date from 08/15/24 to 10/01/24         See note for plan of treatment details and functional goals     Abiodun Weems PT                         I CERTIFY THE NEED FOR THESE SERVICES FURNISHED UNDER        THIS PLAN OF TREATMENT AND WHILE UNDER MY CARE     (Physician attestation of this document indicates review and certification of the therapy plan).              Referring Provider:  Alba Iraheta    Initial Assessment  See Epic Evaluation- Start of Care Date: 06/05/24         10/01/24 0500   Appointment Info   Signing clinician's name / credentials Abiodun Weems PT, DPT   Total/Authorized Visits 3+1   Visits Used 4   Medical Diagnosis Acute pain of left shoulder   PT Tx Diagnosis Acute pain of left shoulder   Progress Note/Certification   Start of Care Date 06/05/24   Onset of illness/injury or Date of Surgery 06/05/24   Therapy Frequency 1 visit   Predicted Duration 1 visit   Certification date from 08/15/24   Certification date to 10/01/24   Progress Note Due Date 08/14/24   Progress Note Completed Date 10/01/24   GOALS   PT Goals 2   PT Goal 1   Goal Identifier Reaching   Goal Description Pt will be able to reach overhead, out to the side, behind the back and cross body pain free in order to reach cabinets, for dressing, and ADls    Rationale to maximize safety and independence with performance of ADLs and functional tasks;to maximize safety and independence within the home;to maximize safety and independence within the community;to maximize safety and independence with transportation;to maximize safety and independence with self cares   Goal Progress goal met   Target Date 08/14/24   Date Met 10/01/24   PT Goal 2   Goal Identifier Lifting   Goal Description Pt will be able to lift > 5 pounds overhead pain free in order to place things into high spaces with ADLs at home, and perform daily tasks at home   Rationale to maximize safety and independence with performance of ADLs and functional tasks;to maximize safety and independence within the home;to maximize safety and independence within the community;to maximize safety and independence with transportation;to maximize safety and independence with self cares   Goal Progress goal met   Target Date 08/14/24   Date Met 10/01/24   Subjective Report   Subjective Report Pt reports that her shoulder is nearly 100% now. Able to do everything she would like to do with the shoulders. Ready to d/c at this time and will continue HEP.   Objective Measures   Objective Measures Objective Measure 1;Objective Measure 2;Objective Measure 3   Objective Measure 1   Objective Measure AROM:   Details Flexion 155, Abduction 155, ER 70, IR T7   Objective Measure 2   Objective Measure MMT:   Details ER 5/5, IR 5/5, Flexion 5/5, Abduction 5/5   Objective Measure 3   Objective Measure PROM   Details Flexion 15, ER in 90 abduction 90, IR in 90 abduction 55   Treatment Interventions (PT)   Interventions Therapeutic Procedure/Exercise;Neuromuscular Re-education   Therapeutic Procedure/Exercise   Therapeutic Procedures: strength, endurance, ROM, flexibility minutes (78098) 10   Therapeutic Procedures Ther Proc 2   Ther Proc 1 Education   PTRx Ther Proc 1 Shoulder External Rotation Sidelying   PTRx Ther Proc 1 - Details  using 2 pounds now this is a good challenge   PTRx Ther Proc 2 Wand Shoulder Abduction Standing   PTRx Ther Proc 2 - Details x 10 with 5 sec holds L tolerates well overall    PTRx Ther Proc 3 Shoulder Scaption Full Can   PTRx Ther Proc 3 - Details HEP   PTRx Ther Proc 4 Wand Shoulder External Rotation in Standing   PTRx Ther Proc 4 - Details x 10L with 5 sec holds    PTRx Ther Proc 5 Standing Passive Shoulder Flexion   PTRx Ther Proc 5 - Details x 10   Skilled Intervention Cues for form and control throughout   Patient Response/Progress Tolerated initial HEP well   Neuromuscular Re-education   PTRx Neuro Re-ed 1 Scapular Retraction/Depression   PTRx Neuro Re-ed 1 - Details HEP   PTRx Neuro Re-ed 2 Rowing with Shoulders Up and Back   PTRx Neuro Re-ed 2 - Details HEP   PTRx Neuro Re-ed 3 Shoulder Theraband Low Row/Pulldown   PTRx Neuro Re-ed 3 - Details HEP   PTRx Neuro Re-ed 4 Push-Up Plus At Counter   PTRx Neuro Re-ed 4 - Details NT   Skilled Intervention cues for form   Patient Response/Progress tolerates well overall   Education   Learner/Method Patient;No Barriers to Learning   Education Comments no concerns   Plan   Home program Ptrx HEP   Plan for next session d/c to HEP   Total Session Time   Timed Code Treatment Minutes 10   Total Treatment Time (sum of timed and untimed services) 10         DISCHARGE  Reason for Discharge: Patient has met all goals.    Equipment Issued: theraband    Discharge Plan: Patient to continue home program.    Referring Provider:  Alba Iraheta

## 2024-11-12 ENCOUNTER — OFFICE VISIT (OUTPATIENT)
Dept: FAMILY MEDICINE | Facility: CLINIC | Age: 64
End: 2024-11-12
Payer: MEDICARE

## 2024-11-12 VITALS
HEART RATE: 92 BPM | RESPIRATION RATE: 18 BRPM | SYSTOLIC BLOOD PRESSURE: 110 MMHG | BODY MASS INDEX: 27.35 KG/M2 | OXYGEN SATURATION: 100 % | DIASTOLIC BLOOD PRESSURE: 60 MMHG | WEIGHT: 190.6 LBS | TEMPERATURE: 98.1 F

## 2024-11-12 DIAGNOSIS — N89.8 VAGINAL DISCHARGE: Primary | ICD-10-CM

## 2024-11-12 LAB
CLUE CELLS: ABNORMAL
TRICHOMONAS, WET PREP: ABNORMAL
WBC'S/HIGH POWER FIELD, WET PREP: ABNORMAL
YEAST, WET PREP: ABNORMAL

## 2024-11-12 PROCEDURE — 87210 SMEAR WET MOUNT SALINE/INK: CPT

## 2024-11-12 PROCEDURE — G2211 COMPLEX E/M VISIT ADD ON: HCPCS

## 2024-11-12 PROCEDURE — 99213 OFFICE O/P EST LOW 20 MIN: CPT

## 2024-11-12 ASSESSMENT — PAIN SCALES - GENERAL: PAINLEVEL_OUTOF10: NO PAIN (0)

## 2024-11-12 NOTE — PROGRESS NOTES
"  Assessment & Plan     Vaginal discharge  Patient declined  exam today and preferred to perform a self-swab for wet prep. Wet prep results were positive for some white blood cells, but no clue cells, yeast, or signs of a trichomonas infection. Reassurance provided to patient that she does not have a vaginal infection and does not require antibiotic therapy at this time. If she should develop other vaginal or urinary symptoms, patient should return to clinic for repeat wet prep or UA for further evaluation.   - Wet prep - Clinic Collect    Patient should follow up in 2 weeks if symptoms do not improve or sooner for new or worsening symptoms. All questions answered to patient's satisfaction. Warning signs of when to seek emergency care were discussed.     Isis Martin PA-C    20 minutes spent by me on the date of the encounter doing chart review, history and exam, documentation and further activities per the note      BMI  Estimated body mass index is 27.35 kg/m  as calculated from the following:    Height as of 9/5/24: 1.778 m (5' 10\").    Weight as of this encounter: 86.5 kg (190 lb 9.6 oz).   Weight management plan: Patient was referred to their PCP to discuss a diet and exercise plan.      Subjective   Alfonzo is a 64 year old, presenting for the following health issues:  Vaginal Problem      11/12/2024     4:10 PM   Additional Questions   Roomed by Shirin Rangel RN   Accompanied by Self     History of Present Illness       Reason for visit:  Vaginal discharge  Symptom onset:  3-7 days ago  Symptoms include:  Vaginal discharge greenish  Symptom intensity:  Mild  Symptom progression:  Staying the same  Had these symptoms before:  No  What makes it worse:  No  What makes it better:  No   She is taking medications regularly.     Alfonzo is a 65yo female who presents today with a 7 day history of abnormal vaginal discharge. She states that she is producing more discharge than normal and that it has a slight green tinge to " it. Discharge is not malodorous. No vaginal itching or irritation. No pelvic pain. No dysuria, urinary frequency, urgency, or burning. No vaginal bleeding. She is leaving to go out of the country tomorrow, and wants to make sure this discharge isn't anything that needs to be treated. Has had a really stressful couple of weeks.     Vaginal Symptoms  Onset/Duration: 7 days ago  Description:  Vaginal Discharge: creamy yellow/green   Itching (Pruritis): No  Burning sensation:  No  Odor: No  Accompanying Signs & Symptoms:  Urinary symptoms: No  Abdominal pain: No  Fever: No  History:   Sexually active: No  New Partner: No  Possibility of Pregnancy:  No  Recent antibiotic use: No  Previous vaginitis issues: No  Precipitating or alleviating factors: None  Therapies tried and outcome: none      Review of Systems  Constitutional, neuro, ENT, endocrine, pulmonary, cardiac, gastrointestinal, genitourinary, musculoskeletal, integument and psychiatric systems are negative, except as otherwise noted.      Objective    /60 (BP Location: Right arm, Patient Position: Sitting, Cuff Size: Adult Regular)   Pulse 92   Temp 98.1  F (36.7  C) (Tympanic)   Resp 18   Wt 86.5 kg (190 lb 9.6 oz)   LMP 05/01/2009   SpO2 100%   BMI 27.35 kg/m    Body mass index is 27.35 kg/m .  Physical Exam   GENERAL: alert and no distress  NECK: no adenopathy, no asymmetry, masses, or scars  RESP: lungs clear to auscultation - no rales, rhonchi or wheezes  CV: regular rate and rhythm, normal S1 S2, no S3 or S4, no murmur, click or rub, no peripheral edema  ABDOMEN: soft, nontender, no hepatosplenomegaly, no masses and bowel sounds normal   (female): pt declined  exam.  MS: no gross musculoskeletal defects noted, no edema  BACK: no CVA tenderness, no paralumbar tenderness  PSYCH: mentation appears normal, affect normal/bright        Signed Electronically by: Isis Martin PA-C

## 2025-07-09 ENCOUNTER — PATIENT OUTREACH (OUTPATIENT)
Dept: CARE COORDINATION | Facility: CLINIC | Age: 65
End: 2025-07-09
Payer: MEDICARE

## 2025-07-10 DIAGNOSIS — Z12.11 COLON CANCER SCREENING: ICD-10-CM

## 2025-07-23 ENCOUNTER — PATIENT OUTREACH (OUTPATIENT)
Dept: CARE COORDINATION | Facility: CLINIC | Age: 65
End: 2025-07-23
Payer: MEDICARE

## 2025-07-24 ENCOUNTER — ORDERS ONLY (AUTO-RELEASED) (OUTPATIENT)
Dept: URGENT CARE | Facility: CLINIC | Age: 65
End: 2025-07-24
Payer: MEDICARE

## 2025-07-24 DIAGNOSIS — Z12.11 COLON CANCER SCREENING: ICD-10-CM

## 2025-08-04 LAB — HEMOCCULT STL QL IA: NEGATIVE
